# Patient Record
Sex: MALE | Race: WHITE | Employment: OTHER | URBAN - METROPOLITAN AREA
[De-identification: names, ages, dates, MRNs, and addresses within clinical notes are randomized per-mention and may not be internally consistent; named-entity substitution may affect disease eponyms.]

---

## 2020-11-02 ENCOUNTER — TRANSCRIBE ORDER (OUTPATIENT)
Dept: REGISTRATION | Age: 80
End: 2020-11-02

## 2020-11-02 DIAGNOSIS — Z01.812 PRE-PROCEDURE LAB EXAM: Primary | ICD-10-CM

## 2020-11-05 ENCOUNTER — HOSPITAL ENCOUNTER (OUTPATIENT)
Dept: PREADMISSION TESTING | Age: 80
Discharge: HOME | End: 2020-11-05
Payer: MEDICARE

## 2020-11-05 ENCOUNTER — HOSPITAL ENCOUNTER (OUTPATIENT)
Dept: GENERAL RADIOLOGY | Age: 80
Discharge: HOME | End: 2020-11-05
Attending: OTOLARYNGOLOGY
Payer: MEDICARE

## 2020-11-05 VITALS
HEIGHT: 67 IN | DIASTOLIC BLOOD PRESSURE: 79 MMHG | BODY MASS INDEX: 26.3 KG/M2 | OXYGEN SATURATION: 95 % | WEIGHT: 167.55 LBS | SYSTOLIC BLOOD PRESSURE: 133 MMHG | TEMPERATURE: 98.1 F | HEART RATE: 57 BPM

## 2020-11-05 LAB
ABO + RH BLD: NORMAL
ALBUMIN SERPL-MCNC: 3.8 G/DL (ref 3.5–5)
ALBUMIN/GLOB SERPL: 1.3 {RATIO} (ref 1.1–2.2)
ALP SERPL-CCNC: 84 U/L (ref 45–117)
ALT SERPL-CCNC: 38 U/L (ref 12–78)
ANION GAP SERPL CALC-SCNC: 5 MMOL/L (ref 5–15)
AST SERPL-CCNC: 35 U/L (ref 15–37)
BASOPHILS # BLD: 0.1 K/UL (ref 0–0.1)
BASOPHILS NFR BLD: 1 % (ref 0–1)
BILIRUB SERPL-MCNC: 0.8 MG/DL (ref 0.2–1)
BLOOD GROUP ANTIBODIES SERPL: NORMAL
BUN SERPL-MCNC: 15 MG/DL (ref 6–20)
BUN/CREAT SERPL: 15 (ref 12–20)
CALCIUM SERPL-MCNC: 9 MG/DL (ref 8.5–10.1)
CHLORIDE SERPL-SCNC: 106 MMOL/L (ref 97–108)
CO2 SERPL-SCNC: 27 MMOL/L (ref 21–32)
CREAT SERPL-MCNC: 0.97 MG/DL (ref 0.7–1.3)
DIFFERENTIAL METHOD BLD: ABNORMAL
EOSINOPHIL # BLD: 0.1 K/UL (ref 0–0.4)
EOSINOPHIL NFR BLD: 1 % (ref 0–7)
ERYTHROCYTE [DISTWIDTH] IN BLOOD BY AUTOMATED COUNT: 14.1 % (ref 11.5–14.5)
GLOBULIN SER CALC-MCNC: 2.9 G/DL (ref 2–4)
GLUCOSE SERPL-MCNC: 94 MG/DL (ref 65–100)
HCT VFR BLD AUTO: 37.6 % (ref 36.6–50.3)
HGB BLD-MCNC: 12.1 G/DL (ref 12.1–17)
IMM GRANULOCYTES # BLD AUTO: 0 K/UL (ref 0–0.04)
IMM GRANULOCYTES NFR BLD AUTO: 0 % (ref 0–0.5)
INR PPP: 1.6 (ref 0.9–1.1)
LYMPHOCYTES # BLD: 0.6 K/UL (ref 0.8–3.5)
LYMPHOCYTES NFR BLD: 12 % (ref 12–49)
MCH RBC QN AUTO: 31.2 PG (ref 26–34)
MCHC RBC AUTO-ENTMCNC: 32.2 G/DL (ref 30–36.5)
MCV RBC AUTO: 96.9 FL (ref 80–99)
MONOCYTES # BLD: 0.5 K/UL (ref 0–1)
MONOCYTES NFR BLD: 9 % (ref 5–13)
NEUTS SEG # BLD: 4 K/UL (ref 1.8–8)
NEUTS SEG NFR BLD: 77 % (ref 32–75)
NRBC # BLD: 0 K/UL (ref 0–0.01)
NRBC BLD-RTO: 0 PER 100 WBC
PLATELET # BLD AUTO: 141 K/UL (ref 150–400)
PMV BLD AUTO: 11 FL (ref 8.9–12.9)
POTASSIUM SERPL-SCNC: 4.2 MMOL/L (ref 3.5–5.1)
PROT SERPL-MCNC: 6.7 G/DL (ref 6.4–8.2)
PROTHROMBIN TIME: 16.3 SEC (ref 9–11.1)
RBC # BLD AUTO: 3.88 M/UL (ref 4.1–5.7)
RBC MORPH BLD: ABNORMAL
SODIUM SERPL-SCNC: 138 MMOL/L (ref 136–145)
SPECIMEN EXP DATE BLD: NORMAL
WBC # BLD AUTO: 5.3 K/UL (ref 4.1–11.1)

## 2020-11-05 PROCEDURE — 86900 BLOOD TYPING SEROLOGIC ABO: CPT

## 2020-11-05 PROCEDURE — 36415 COLL VENOUS BLD VENIPUNCTURE: CPT

## 2020-11-05 PROCEDURE — 71046 X-RAY EXAM CHEST 2 VIEWS: CPT

## 2020-11-05 PROCEDURE — 80053 COMPREHEN METABOLIC PANEL: CPT

## 2020-11-05 PROCEDURE — 85025 COMPLETE CBC W/AUTO DIFF WBC: CPT

## 2020-11-05 PROCEDURE — 85610 PROTHROMBIN TIME: CPT

## 2020-11-05 RX ORDER — ACETYLCYSTEINE 600 MG
600 CAPSULE ORAL
Status: ON HOLD | COMMUNITY
End: 2021-12-21

## 2020-11-05 RX ORDER — WARFARIN 4 MG/1
4 TABLET ORAL EVERY EVENING
Status: ON HOLD | COMMUNITY
End: 2021-12-21

## 2020-11-05 RX ORDER — GABAPENTIN 100 MG/1
100 CAPSULE ORAL 2 TIMES DAILY
Status: ON HOLD | COMMUNITY
End: 2021-12-21

## 2020-11-05 RX ORDER — BISOPROLOL FUMARATE AND HYDROCHLOROTHIAZIDE 2.5; 6.25 MG/1; MG/1
1 TABLET ORAL DAILY
Status: ON HOLD | COMMUNITY
End: 2021-12-21

## 2020-11-05 RX ORDER — POTASSIUM CHLORIDE 750 MG/1
10 TABLET, FILM COATED, EXTENDED RELEASE ORAL
COMMUNITY

## 2020-11-05 RX ORDER — SPIRONOLACTONE 25 MG
TABLET ORAL
Status: ON HOLD | COMMUNITY
End: 2021-12-21

## 2020-11-05 RX ORDER — HYDROXYCHLOROQUINE SULFATE 200 MG/1
200 TABLET, FILM COATED ORAL 2 TIMES DAILY
COMMUNITY

## 2020-11-05 RX ORDER — PREDNISONE 1 MG/1
1 TABLET ORAL
Status: ON HOLD | COMMUNITY
End: 2021-12-21

## 2020-11-05 RX ORDER — MYCOPHENOLATE MOFETIL 500 MG/1
500 TABLET ORAL EVERY EVENING
Status: ON HOLD | COMMUNITY
End: 2021-12-21

## 2020-11-05 NOTE — PERIOP NOTES
Preoperative instructions reviewed with patient. Patient given SSI infection FAQS sheet. Given two bottles of skin prep chlorhexidine soap; given written and verbal instructions on use. Patient was given the opportunity to ask questions on the information provided. Patient instructed to obtain chest X-ray at [address] [place] [address] upon leaving PAT department. INFORMATION REGARDING COVID 19 TESTING PRIOR TO SURGERY WAS PROVIDED TO THE PATIENT WITH THE OPPORTUNITY FOR QUESTIONS. PATIENT VERBALIZED UNDERSTANDING. CONFIRMED WITH PT AND HIS WIFE HE IS TAKING BISOPROLOL-HYDROCHLOROTHIAZIDE AND INSTRUCTED HIM TO TAKE DOSE MORNING OF SURGERY WITH A SMALL SIP OF WATER PER ANESTHESIA GUIDELINES.

## 2020-11-06 NOTE — PERIOP NOTES
11/6/20 @ 1300 - LEFT A DETAILED MESSAGE ON [facility]. CARE COORDINATOR VOICE MAIL IN REGARDS TO PT'S ABNORMAL LABS. INR=1.6, PT-16.3, SLU=848, RBC=3.88, NEUTROPH=77, ABS. LYMPH=0.6  REQUESTED RETURN CALL TO VERIFY THAT MESSAGE WAS RECEIVED. LABS FAXED TO PT'S PCP.

## 2020-11-08 ENCOUNTER — HOSPITAL ENCOUNTER (OUTPATIENT)
Dept: PREADMISSION TESTING | Age: 80
Discharge: HOME | End: 2020-11-08
Payer: MEDICARE

## 2020-11-08 DIAGNOSIS — Z01.812 PRE-PROCEDURE LAB EXAM: ICD-10-CM

## 2020-11-08 PROCEDURE — 87635 SARS-COV-2 COVID-19 AMP PRB: CPT

## 2020-11-09 LAB — SARS-COV-2, COV2NT: NOT DETECTED

## 2020-11-12 ENCOUNTER — ANESTHESIA EVENT (OUTPATIENT)
Dept: SURGERY | Age: 80
DRG: 577 | End: 2020-11-12
Payer: MEDICARE

## 2020-11-12 ENCOUNTER — ANESTHESIA (OUTPATIENT)
Dept: SURGERY | Age: 80
DRG: 577 | End: 2020-11-12
Payer: MEDICARE

## 2020-11-12 ENCOUNTER — HOSPITAL ENCOUNTER (INPATIENT)
Age: 80
LOS: 2 days | Discharge: HOME | DRG: 577 | End: 2020-11-14
Attending: OTOLARYNGOLOGY | Admitting: OTOLARYNGOLOGY
Payer: MEDICARE

## 2020-11-12 DIAGNOSIS — C44.320 SCC (SQUAMOUS CELL CARCINOMA), FACE: Primary | ICD-10-CM

## 2020-11-12 LAB — INR BLD: 1.3 (ref 0.9–1.2)

## 2020-11-12 PROCEDURE — 0HB1XZZ EXCISION OF FACE SKIN, EXTERNAL APPROACH: ICD-10-PCS | Performed by: OTOLARYNGOLOGY

## 2020-11-12 PROCEDURE — 74011000250 HC RX REV CODE- 250: Performed by: NURSE ANESTHETIST, CERTIFIED REGISTERED

## 2020-11-12 PROCEDURE — 07B20ZZ EXCISION OF LEFT NECK LYMPHATIC, OPEN APPROACH: ICD-10-PCS | Performed by: OTOLARYNGOLOGY

## 2020-11-12 PROCEDURE — 0CT90ZZ RESECTION OF LEFT PAROTID GLAND, OPEN APPROACH: ICD-10-PCS | Performed by: OTOLARYNGOLOGY

## 2020-11-12 PROCEDURE — 76060000040 HC ANESTHESIA 4.5 TO 5 HR: Performed by: OTOLARYNGOLOGY

## 2020-11-12 PROCEDURE — 88307 TISSUE EXAM BY PATHOLOGIST: CPT

## 2020-11-12 PROCEDURE — 77030040356 HC CORD BPLR FRCP COVD -A: Performed by: OTOLARYNGOLOGY

## 2020-11-12 PROCEDURE — 74011250637 HC RX REV CODE- 250/637: Performed by: OTOLARYNGOLOGY

## 2020-11-12 PROCEDURE — 88331 PATH CONSLTJ SURG 1 BLK 1SPC: CPT

## 2020-11-12 PROCEDURE — 88305 TISSUE EXAM BY PATHOLOGIST: CPT

## 2020-11-12 PROCEDURE — 77030002933 HC SUT MCRYL J&J -A: Performed by: OTOLARYNGOLOGY

## 2020-11-12 PROCEDURE — 74011250636 HC RX REV CODE- 250/636: Performed by: OTOLARYNGOLOGY

## 2020-11-12 PROCEDURE — 74011250637 HC RX REV CODE- 250/637: Performed by: ANESTHESIOLOGY

## 2020-11-12 PROCEDURE — 4A11X4Z MONITORING OF PERIPHERAL NERVOUS ELECTRICAL ACTIVITY, EXTERNAL APPROACH: ICD-10-PCS | Performed by: OTOLARYNGOLOGY

## 2020-11-12 PROCEDURE — 77030002996 HC SUT SLK J&J -A: Performed by: OTOLARYNGOLOGY

## 2020-11-12 PROCEDURE — 74011250636 HC RX REV CODE- 250/636: Performed by: ANESTHESIOLOGY

## 2020-11-12 PROCEDURE — 77030040922 HC BLNKT HYPOTHRM STRY -A

## 2020-11-12 PROCEDURE — 74011000250 HC RX REV CODE- 250: Performed by: OTOLARYNGOLOGY

## 2020-11-12 PROCEDURE — 77030008684 HC TU ET CUF COVD -B: Performed by: ANESTHESIOLOGY

## 2020-11-12 PROCEDURE — 77030026438 HC STYL ET INTUB CARD -A: Performed by: ANESTHESIOLOGY

## 2020-11-12 PROCEDURE — 85610 PROTHROMBIN TIME: CPT

## 2020-11-12 PROCEDURE — 76010000175 HC OR TIME 4 TO 4.5 HR INTENSV-TIER 1: Performed by: OTOLARYNGOLOGY

## 2020-11-12 PROCEDURE — 74011250636 HC RX REV CODE- 250/636: Performed by: NURSE ANESTHETIST, CERTIFIED REGISTERED

## 2020-11-12 PROCEDURE — 77030008462 HC STPLR SKN PROX J&J -A: Performed by: OTOLARYNGOLOGY

## 2020-11-12 PROCEDURE — 77030040361 HC SLV COMPR DVT MDII -B: Performed by: OTOLARYNGOLOGY

## 2020-11-12 PROCEDURE — 00BM0ZZ EXCISION OF FACIAL NERVE, OPEN APPROACH: ICD-10-PCS | Performed by: OTOLARYNGOLOGY

## 2020-11-12 PROCEDURE — 77030002974 HC SUT PLN J&J -A: Performed by: OTOLARYNGOLOGY

## 2020-11-12 PROCEDURE — 0HX5XZZ TRANSFER CHEST SKIN, EXTERNAL APPROACH: ICD-10-PCS | Performed by: OTOLARYNGOLOGY

## 2020-11-12 PROCEDURE — 65270000032 HC RM SEMIPRIVATE

## 2020-11-12 PROCEDURE — 77030011267 HC ELECTRD BLD COVD -A: Performed by: OTOLARYNGOLOGY

## 2020-11-12 PROCEDURE — 77030012623 HC STIM NRV HND MEDT -B: Performed by: OTOLARYNGOLOGY

## 2020-11-12 PROCEDURE — 2709999900 HC NON-CHARGEABLE SUPPLY: Performed by: OTOLARYNGOLOGY

## 2020-11-12 PROCEDURE — 76210000006 HC OR PH I REC 0.5 TO 1 HR: Performed by: OTOLARYNGOLOGY

## 2020-11-12 PROCEDURE — 77030031139 HC SUT VCRL2 J&J -A: Performed by: OTOLARYNGOLOGY

## 2020-11-12 RX ORDER — NEOSTIGMINE METHYLSULFATE 1 MG/ML
INJECTION, SOLUTION INTRAVENOUS AS NEEDED
Status: DISCONTINUED | OUTPATIENT
Start: 2020-11-12 | End: 2020-11-12 | Stop reason: HOSPADM

## 2020-11-12 RX ORDER — ONDANSETRON 2 MG/ML
INJECTION INTRAMUSCULAR; INTRAVENOUS AS NEEDED
Status: DISCONTINUED | OUTPATIENT
Start: 2020-11-12 | End: 2020-11-12 | Stop reason: HOSPADM

## 2020-11-12 RX ORDER — ROCURONIUM BROMIDE 10 MG/ML
INJECTION, SOLUTION INTRAVENOUS AS NEEDED
Status: DISCONTINUED | OUTPATIENT
Start: 2020-11-12 | End: 2020-11-12 | Stop reason: HOSPADM

## 2020-11-12 RX ORDER — BISOPROLOL FUMARATE AND HYDROCHLOROTHIAZIDE 2.5; 6.25 MG/1; MG/1
1 TABLET ORAL DAILY
Status: DISCONTINUED | OUTPATIENT
Start: 2020-11-13 | End: 2020-11-14 | Stop reason: HOSPADM

## 2020-11-12 RX ORDER — DIPHENHYDRAMINE HYDROCHLORIDE 50 MG/ML
12.5 INJECTION, SOLUTION INTRAMUSCULAR; INTRAVENOUS AS NEEDED
Status: DISCONTINUED | OUTPATIENT
Start: 2020-11-12 | End: 2020-11-12 | Stop reason: HOSPADM

## 2020-11-12 RX ORDER — PROPOFOL 10 MG/ML
INJECTION, EMULSION INTRAVENOUS AS NEEDED
Status: DISCONTINUED | OUTPATIENT
Start: 2020-11-12 | End: 2020-11-12 | Stop reason: HOSPADM

## 2020-11-12 RX ORDER — SODIUM CHLORIDE, SODIUM LACTATE, POTASSIUM CHLORIDE, CALCIUM CHLORIDE 600; 310; 30; 20 MG/100ML; MG/100ML; MG/100ML; MG/100ML
100 INJECTION, SOLUTION INTRAVENOUS CONTINUOUS
Status: DISCONTINUED | OUTPATIENT
Start: 2020-11-12 | End: 2020-11-12 | Stop reason: HOSPADM

## 2020-11-12 RX ORDER — CEFAZOLIN SODIUM/WATER 2 G/20 ML
2 SYRINGE (ML) INTRAVENOUS ONCE
Status: DISCONTINUED | OUTPATIENT
Start: 2020-11-12 | End: 2020-11-12 | Stop reason: HOSPADM

## 2020-11-12 RX ORDER — SODIUM CHLORIDE 0.9 % (FLUSH) 0.9 %
5-40 SYRINGE (ML) INJECTION AS NEEDED
Status: DISCONTINUED | OUTPATIENT
Start: 2020-11-12 | End: 2020-11-12 | Stop reason: HOSPADM

## 2020-11-12 RX ORDER — MORPHINE SULFATE 10 MG/ML
2 INJECTION, SOLUTION INTRAMUSCULAR; INTRAVENOUS
Status: DISCONTINUED | OUTPATIENT
Start: 2020-11-12 | End: 2020-11-12 | Stop reason: HOSPADM

## 2020-11-12 RX ORDER — HYDROMORPHONE HYDROCHLORIDE 2 MG/ML
INJECTION, SOLUTION INTRAMUSCULAR; INTRAVENOUS; SUBCUTANEOUS AS NEEDED
Status: DISCONTINUED | OUTPATIENT
Start: 2020-11-12 | End: 2020-11-12 | Stop reason: HOSPADM

## 2020-11-12 RX ORDER — FENTANYL CITRATE 50 UG/ML
25 INJECTION, SOLUTION INTRAMUSCULAR; INTRAVENOUS
Status: DISCONTINUED | OUTPATIENT
Start: 2020-11-12 | End: 2020-11-12 | Stop reason: HOSPADM

## 2020-11-12 RX ORDER — ATORVASTATIN CALCIUM 40 MG/1
40 TABLET, FILM COATED ORAL EVERY EVENING
Status: DISCONTINUED | OUTPATIENT
Start: 2020-11-12 | End: 2020-11-14 | Stop reason: HOSPADM

## 2020-11-12 RX ORDER — EPHEDRINE SULFATE/0.9% NACL/PF 50 MG/5 ML
SYRINGE (ML) INTRAVENOUS AS NEEDED
Status: DISCONTINUED | OUTPATIENT
Start: 2020-11-12 | End: 2020-11-12 | Stop reason: HOSPADM

## 2020-11-12 RX ORDER — BACITRACIN 500 [USP'U]/G
OINTMENT TOPICAL AS NEEDED
Status: DISCONTINUED | OUTPATIENT
Start: 2020-11-12 | End: 2020-11-12 | Stop reason: HOSPADM

## 2020-11-12 RX ORDER — SODIUM CHLORIDE 0.9 % (FLUSH) 0.9 %
5-40 SYRINGE (ML) INJECTION EVERY 8 HOURS
Status: DISCONTINUED | OUTPATIENT
Start: 2020-11-12 | End: 2020-11-12 | Stop reason: HOSPADM

## 2020-11-12 RX ORDER — LIDOCAINE HYDROCHLORIDE 10 MG/ML
0.1 INJECTION, SOLUTION EPIDURAL; INFILTRATION; INTRACAUDAL; PERINEURAL AS NEEDED
Status: DISCONTINUED | OUTPATIENT
Start: 2020-11-12 | End: 2020-11-12 | Stop reason: HOSPADM

## 2020-11-12 RX ORDER — PHENYLEPHRINE HCL IN 0.9% NACL 0.4MG/10ML
SYRINGE (ML) INTRAVENOUS AS NEEDED
Status: DISCONTINUED | OUTPATIENT
Start: 2020-11-12 | End: 2020-11-12 | Stop reason: HOSPADM

## 2020-11-12 RX ORDER — SODIUM CHLORIDE, SODIUM LACTATE, POTASSIUM CHLORIDE, CALCIUM CHLORIDE 600; 310; 30; 20 MG/100ML; MG/100ML; MG/100ML; MG/100ML
INJECTION, SOLUTION INTRAVENOUS
Status: DISCONTINUED | OUTPATIENT
Start: 2020-11-12 | End: 2020-11-12 | Stop reason: HOSPADM

## 2020-11-12 RX ORDER — HYDROCODONE BITARTRATE AND ACETAMINOPHEN 5; 325 MG/1; MG/1
1 TABLET ORAL
Status: DISCONTINUED | OUTPATIENT
Start: 2020-11-12 | End: 2020-11-14 | Stop reason: HOSPADM

## 2020-11-12 RX ORDER — SODIUM CHLORIDE 9 MG/ML
25 INJECTION, SOLUTION INTRAVENOUS CONTINUOUS
Status: DISCONTINUED | OUTPATIENT
Start: 2020-11-12 | End: 2020-11-12 | Stop reason: HOSPADM

## 2020-11-12 RX ORDER — OXYCODONE HYDROCHLORIDE 5 MG/1
5 TABLET ORAL AS NEEDED
Status: DISCONTINUED | OUTPATIENT
Start: 2020-11-12 | End: 2020-11-12 | Stop reason: HOSPADM

## 2020-11-12 RX ORDER — MIDAZOLAM HYDROCHLORIDE 1 MG/ML
1 INJECTION, SOLUTION INTRAMUSCULAR; INTRAVENOUS AS NEEDED
Status: DISCONTINUED | OUTPATIENT
Start: 2020-11-12 | End: 2020-11-12 | Stop reason: HOSPADM

## 2020-11-12 RX ORDER — DEXAMETHASONE SODIUM PHOSPHATE 4 MG/ML
INJECTION, SOLUTION INTRA-ARTICULAR; INTRALESIONAL; INTRAMUSCULAR; INTRAVENOUS; SOFT TISSUE AS NEEDED
Status: DISCONTINUED | OUTPATIENT
Start: 2020-11-12 | End: 2020-11-12 | Stop reason: HOSPADM

## 2020-11-12 RX ORDER — ACETAMINOPHEN 325 MG/1
650 TABLET ORAL ONCE
Status: COMPLETED | OUTPATIENT
Start: 2020-11-12 | End: 2020-11-12

## 2020-11-12 RX ORDER — HYDROXYCHLOROQUINE SULFATE 200 MG/1
200 TABLET, FILM COATED ORAL 2 TIMES DAILY
Status: DISCONTINUED | OUTPATIENT
Start: 2020-11-12 | End: 2020-11-14 | Stop reason: HOSPADM

## 2020-11-12 RX ORDER — PREDNISONE 1 MG/1
1 TABLET ORAL
Status: DISCONTINUED | OUTPATIENT
Start: 2020-11-13 | End: 2020-11-14 | Stop reason: HOSPADM

## 2020-11-12 RX ORDER — GABAPENTIN 100 MG/1
100 CAPSULE ORAL 2 TIMES DAILY
Status: DISCONTINUED | OUTPATIENT
Start: 2020-11-12 | End: 2020-11-14 | Stop reason: HOSPADM

## 2020-11-12 RX ORDER — FENTANYL CITRATE 50 UG/ML
INJECTION, SOLUTION INTRAMUSCULAR; INTRAVENOUS AS NEEDED
Status: DISCONTINUED | OUTPATIENT
Start: 2020-11-12 | End: 2020-11-12 | Stop reason: HOSPADM

## 2020-11-12 RX ORDER — SODIUM CHLORIDE, SODIUM LACTATE, POTASSIUM CHLORIDE, CALCIUM CHLORIDE 600; 310; 30; 20 MG/100ML; MG/100ML; MG/100ML; MG/100ML
1000 INJECTION, SOLUTION INTRAVENOUS CONTINUOUS
Status: DISPENSED | OUTPATIENT
Start: 2020-11-12 | End: 2020-11-13

## 2020-11-12 RX ORDER — AMLODIPINE BESYLATE 5 MG/1
5 TABLET ORAL EVERY EVENING
Status: DISCONTINUED | OUTPATIENT
Start: 2020-11-12 | End: 2020-11-14 | Stop reason: HOSPADM

## 2020-11-12 RX ORDER — CEFAZOLIN SODIUM/WATER 2 G/20 ML
2 SYRINGE (ML) INTRAVENOUS ONCE
Status: COMPLETED | OUTPATIENT
Start: 2020-11-12 | End: 2020-11-12

## 2020-11-12 RX ORDER — MYCOPHENOLATE MOFETIL 250 MG/1
500 CAPSULE ORAL
Status: DISCONTINUED | OUTPATIENT
Start: 2020-11-13 | End: 2020-11-14 | Stop reason: HOSPADM

## 2020-11-12 RX ORDER — MIDAZOLAM HYDROCHLORIDE 1 MG/ML
INJECTION, SOLUTION INTRAMUSCULAR; INTRAVENOUS AS NEEDED
Status: DISCONTINUED | OUTPATIENT
Start: 2020-11-12 | End: 2020-11-12 | Stop reason: HOSPADM

## 2020-11-12 RX ORDER — LIDOCAINE HYDROCHLORIDE AND EPINEPHRINE 10; 10 MG/ML; UG/ML
INJECTION, SOLUTION INFILTRATION; PERINEURAL AS NEEDED
Status: DISCONTINUED | OUTPATIENT
Start: 2020-11-12 | End: 2020-11-12 | Stop reason: HOSPADM

## 2020-11-12 RX ORDER — LIDOCAINE HYDROCHLORIDE 20 MG/ML
INJECTION, SOLUTION EPIDURAL; INFILTRATION; INTRACAUDAL; PERINEURAL AS NEEDED
Status: DISCONTINUED | OUTPATIENT
Start: 2020-11-12 | End: 2020-11-12 | Stop reason: HOSPADM

## 2020-11-12 RX ORDER — GLYCOPYRROLATE 0.2 MG/ML
INJECTION INTRAMUSCULAR; INTRAVENOUS AS NEEDED
Status: DISCONTINUED | OUTPATIENT
Start: 2020-11-12 | End: 2020-11-12 | Stop reason: HOSPADM

## 2020-11-12 RX ORDER — MIDAZOLAM HYDROCHLORIDE 1 MG/ML
0.5 INJECTION, SOLUTION INTRAMUSCULAR; INTRAVENOUS
Status: DISCONTINUED | OUTPATIENT
Start: 2020-11-12 | End: 2020-11-12 | Stop reason: HOSPADM

## 2020-11-12 RX ORDER — ALLOPURINOL 300 MG/1
300 TABLET ORAL DAILY
Status: DISCONTINUED | OUTPATIENT
Start: 2020-11-13 | End: 2020-11-14 | Stop reason: HOSPADM

## 2020-11-12 RX ORDER — ONDANSETRON 2 MG/ML
4 INJECTION INTRAMUSCULAR; INTRAVENOUS AS NEEDED
Status: DISCONTINUED | OUTPATIENT
Start: 2020-11-12 | End: 2020-11-12 | Stop reason: HOSPADM

## 2020-11-12 RX ORDER — FENTANYL CITRATE 50 UG/ML
50 INJECTION, SOLUTION INTRAMUSCULAR; INTRAVENOUS AS NEEDED
Status: DISCONTINUED | OUTPATIENT
Start: 2020-11-12 | End: 2020-11-12 | Stop reason: HOSPADM

## 2020-11-12 RX ORDER — MULTIVIT WITH MINERALS/HERBS
1 TABLET ORAL DAILY
Status: DISCONTINUED | OUTPATIENT
Start: 2020-11-13 | End: 2020-11-14 | Stop reason: HOSPADM

## 2020-11-12 RX ORDER — FERROUS SULFATE, DRIED 160(50) MG
1 TABLET, EXTENDED RELEASE ORAL
Status: DISCONTINUED | OUTPATIENT
Start: 2020-11-13 | End: 2020-11-14 | Stop reason: HOSPADM

## 2020-11-12 RX ORDER — ROPIVACAINE HYDROCHLORIDE 5 MG/ML
150 INJECTION, SOLUTION EPIDURAL; INFILTRATION; PERINEURAL AS NEEDED
Status: DISCONTINUED | OUTPATIENT
Start: 2020-11-12 | End: 2020-11-12 | Stop reason: HOSPADM

## 2020-11-12 RX ORDER — HYDROMORPHONE HYDROCHLORIDE 1 MG/ML
0.2 INJECTION, SOLUTION INTRAMUSCULAR; INTRAVENOUS; SUBCUTANEOUS
Status: DISCONTINUED | OUTPATIENT
Start: 2020-11-12 | End: 2020-11-12 | Stop reason: HOSPADM

## 2020-11-12 RX ORDER — SODIUM CHLORIDE 9 MG/ML
25 INJECTION, SOLUTION INTRAVENOUS CONTINUOUS
Status: DISPENSED | OUTPATIENT
Start: 2020-11-12 | End: 2020-11-13

## 2020-11-12 RX ORDER — EPHEDRINE SULFATE/0.9% NACL/PF 50 MG/5 ML
5 SYRINGE (ML) INTRAVENOUS AS NEEDED
Status: DISCONTINUED | OUTPATIENT
Start: 2020-11-12 | End: 2020-11-12 | Stop reason: HOSPADM

## 2020-11-12 RX ORDER — POTASSIUM CHLORIDE 750 MG/1
10 TABLET, FILM COATED, EXTENDED RELEASE ORAL EVERY EVENING
Status: DISCONTINUED | OUTPATIENT
Start: 2020-11-12 | End: 2020-11-14 | Stop reason: HOSPADM

## 2020-11-12 RX ORDER — SUCCINYLCHOLINE CHLORIDE 20 MG/ML
INJECTION INTRAMUSCULAR; INTRAVENOUS AS NEEDED
Status: DISCONTINUED | OUTPATIENT
Start: 2020-11-12 | End: 2020-11-12 | Stop reason: HOSPADM

## 2020-11-12 RX ADMIN — FENTANYL CITRATE 50 MCG: 50 INJECTION, SOLUTION INTRAMUSCULAR; INTRAVENOUS at 16:24

## 2020-11-12 RX ADMIN — SODIUM CHLORIDE, SODIUM LACTATE, POTASSIUM CHLORIDE, AND CALCIUM CHLORIDE 1000 ML: 600; 310; 30; 20 INJECTION, SOLUTION INTRAVENOUS at 13:39

## 2020-11-12 RX ADMIN — MIDAZOLAM 2 MG: 1 INJECTION INTRAMUSCULAR; INTRAVENOUS at 14:46

## 2020-11-12 RX ADMIN — FENTANYL CITRATE 100 MCG: 50 INJECTION, SOLUTION INTRAMUSCULAR; INTRAVENOUS at 14:56

## 2020-11-12 RX ADMIN — HYDROXYCHLOROQUINE SULFATE 200 MG: 200 TABLET, FILM COATED ORAL at 20:59

## 2020-11-12 RX ADMIN — ACETAMINOPHEN 650 MG: 325 TABLET ORAL at 13:49

## 2020-11-12 RX ADMIN — ROCURONIUM BROMIDE 25 MG: 10 SOLUTION INTRAVENOUS at 17:25

## 2020-11-12 RX ADMIN — Medication 80 MCG: at 16:00

## 2020-11-12 RX ADMIN — HYDROMORPHONE HYDROCHLORIDE 0.6 MG: 2 INJECTION, SOLUTION INTRAMUSCULAR; INTRAVENOUS; SUBCUTANEOUS at 18:01

## 2020-11-12 RX ADMIN — DEXAMETHASONE SODIUM PHOSPHATE 8 MG: 4 INJECTION, SOLUTION INTRAMUSCULAR; INTRAVENOUS at 15:10

## 2020-11-12 RX ADMIN — SODIUM CHLORIDE: 900 INJECTION, SOLUTION INTRAVENOUS at 16:55

## 2020-11-12 RX ADMIN — Medication 20 MG: at 15:13

## 2020-11-12 RX ADMIN — LIDOCAINE HYDROCHLORIDE 100 MG: 20 INJECTION, SOLUTION EPIDURAL; INFILTRATION; INTRACAUDAL; PERINEURAL at 14:56

## 2020-11-12 RX ADMIN — Medication 2 MG: at 18:44

## 2020-11-12 RX ADMIN — GLYCOPYRROLATE 0.2 MG: 0.2 INJECTION, SOLUTION INTRAMUSCULAR; INTRAVENOUS at 16:35

## 2020-11-12 RX ADMIN — PROPOFOL 100 MG: 10 INJECTION, EMULSION INTRAVENOUS at 16:23

## 2020-11-12 RX ADMIN — AMLODIPINE BESYLATE 5 MG: 5 TABLET ORAL at 20:59

## 2020-11-12 RX ADMIN — HYDROMORPHONE HYDROCHLORIDE 0.4 MG: 2 INJECTION, SOLUTION INTRAMUSCULAR; INTRAVENOUS; SUBCUTANEOUS at 16:25

## 2020-11-12 RX ADMIN — GLYCOPYRROLATE 0.4 MG: 0.2 INJECTION, SOLUTION INTRAMUSCULAR; INTRAVENOUS at 18:44

## 2020-11-12 RX ADMIN — POTASSIUM CHLORIDE 10 MEQ: 750 TABLET, FILM COATED, EXTENDED RELEASE ORAL at 20:59

## 2020-11-12 RX ADMIN — ATORVASTATIN CALCIUM 40 MG: 40 TABLET, FILM COATED ORAL at 20:59

## 2020-11-12 RX ADMIN — GABAPENTIN 100 MG: 100 CAPSULE ORAL at 20:59

## 2020-11-12 RX ADMIN — Medication 10 MG: at 15:56

## 2020-11-12 RX ADMIN — ROCURONIUM BROMIDE 5 MG: 10 SOLUTION INTRAVENOUS at 14:56

## 2020-11-12 RX ADMIN — Medication 120 MCG: at 15:12

## 2020-11-12 RX ADMIN — Medication 2 G: at 15:21

## 2020-11-12 RX ADMIN — Medication 10 MG: at 16:32

## 2020-11-12 RX ADMIN — SODIUM CHLORIDE, SODIUM LACTATE, POTASSIUM CHLORIDE, AND CALCIUM CHLORIDE 1000 ML: 600; 310; 30; 20 INJECTION, SOLUTION INTRAVENOUS at 19:30

## 2020-11-12 RX ADMIN — PROPOFOL 50 MG: 10 INJECTION, EMULSION INTRAVENOUS at 15:15

## 2020-11-12 RX ADMIN — SUCCINYLCHOLINE CHLORIDE 160 MG: 20 INJECTION, SOLUTION INTRAMUSCULAR; INTRAVENOUS at 14:56

## 2020-11-12 RX ADMIN — FENTANYL CITRATE 50 MCG: 50 INJECTION, SOLUTION INTRAMUSCULAR; INTRAVENOUS at 15:39

## 2020-11-12 RX ADMIN — PROPOFOL 150 MG: 10 INJECTION, EMULSION INTRAVENOUS at 14:56

## 2020-11-12 RX ADMIN — ONDANSETRON HYDROCHLORIDE 4 MG: 2 INJECTION, SOLUTION INTRAMUSCULAR; INTRAVENOUS at 18:44

## 2020-11-12 RX ADMIN — FENTANYL CITRATE 50 MCG: 50 INJECTION, SOLUTION INTRAMUSCULAR; INTRAVENOUS at 16:59

## 2020-11-12 RX ADMIN — SODIUM CHLORIDE, POTASSIUM CHLORIDE, SODIUM LACTATE AND CALCIUM CHLORIDE: 600; 310; 30; 20 INJECTION, SOLUTION INTRAVENOUS at 14:20

## 2020-11-12 NOTE — ROUTINE PROCESS
Patient: Bo Aranda. MRN: [id number]  SSN: [id number] YOB: 1940  Age: 68 y.o. Sex: male Patient is status post Procedure(s): 
TOTAL EXCISION OF LEFT PAROTID GLAND WITH MODIFIED  RADICAL NECK DISSECTION AND TISSUE REARRANGEMENT FOR DEFECT WITH FACIAL NERVE STIMULATION. Surgeon(s) and Role: Ghada Caraballo MD - Primary Local/Dose/Irrigation:  0.9% normal saline used for irrigation Peripheral IV 11/12/20 Right Hand (Active) Airway - Endotracheal Tube 11/12/20 Oral (Active) Dressing/Packing:  Wound Face Left-Dressing/Treatment: Open to air(SUTURES AND STAPLES_WITH BACITRACIN OINTMENT) (11/12/20 8118) Splint/Cast:  ] Other:  shiraz Tran discontinued at end of case

## 2020-11-12 NOTE — H&P
History and Physical    Subjective:     Mansoor Barrios is a 68 y.o.  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 Had Moh's procedure, identifying tumor extending into parotid gland, leaving positive margin. CT shows tumor in parotid and cervical LN chain. Past Medical History:   Diagnosis Date    Arthritis     CAD (coronary artery disease)     5 stents    Cancer ([facility] 2020    squamous cell, basal cell, melanoma    Coagulation disorder (HCC)     blood clotting d/o from taking naproxen    Hypertension     Lupus ([facility]     Thromboembolus (HCC)       Past Surgical History:   Procedure Laterality Date   [facility]    stents placed    HX CATARACT REMOVAL Bilateral 2010    HX OTHER SURGICAL      multiple spots of skin cancer removal    HX PACEMAKER      VASCULAR SURGERY PROCEDURE UNLIST  2017    IVC filter     Family History   Problem Relation Age of Onset    No Known Problems Mother     Stroke Father     Cancer Sister     No Known Problems Brother     Heart Disease Brother     Cancer Brother     No Known Problems Brother     Anesth Problems Neg Hx       Social History     Tobacco Use    Smoking status: Former Smoker     Packs/day: 1.00     Years: 25.00     Pack years: 25.00     Last attempt to quit: 1994     Years since quittin.8    Smokeless tobacco: Never Used   Substance Use Topics    Alcohol use: Never     Frequency: Never       Prior to Admission medications    Medication Sig Start Date End Date Taking? Authorizing Provider   bisoprolol-hydroCHLOROthiazide [facility]) 2.5-6.25 mg per tablet Take 1 Tab by mouth daily. Yes Provider, Historical   mycophenolate (CELLCEPT) 500 mg tablet Take 500 mg by mouth every evening. Yes Provider, Historical   hydrOXYchloroQUINE (PLAQUENIL) 200 mg tablet Take 200 mg by mouth two (2) times a day.    Yes Provider, Historical   predniSONE (DELTASONE) 1 mg tablet Take 1 mg by mouth daily (with breakfast). Yes Provider, Historical   potassium chloride SR (KLOR-CON 10) 10 mEq tablet Take  by mouth every evening. Yes Provider, Historical   ferrous fumarate/vit Bcomp,C (SUPER B COMPLEX PO) Take  by mouth. Yes Provider, Historical   Calcium-Cholecalciferol, D3, (Calcium 600 with Vitamin D3) 600 mg(1,500mg) -400 unit chew Take  by mouth. Yes Provider, Historical   gabapentin (NEURONTIN) 100 mg capsule Take 100 mg by mouth two (2) times a day. Yes Provider, Historical   acetylcysteine (NAC) 600 mg cap capsule Take 600 mg by mouth. Yes Provider, Historical   allopurinol (ZYLOPRIM) 300 mg tablet TAKE 1 TABLET DAILY 12/15/15  Yes Marlena Song MD   omeprazole (PRILOSEC) 20 mg capsule TAKE 1 CAPSULE DAILY  Patient taking differently: 40 mg daily (with lunch). 5/19/15  Yes Marlena Song MD   atorvastatin (LIPITOR) 20 mg tablet Take 40 mg by mouth every evening. Yes Provider, Historical   amLODIPine (NORVASC) 5 mg tablet Take 5 mg by mouth every evening. Yes Provider, Historical   aspirin delayed-release 81 mg tablet Take 81 mg by mouth daily. Yes Provider, Historical   warfarin (COUMADIN) 4 mg tablet Take 4 mg by mouth every evening. Provider, Historical   varicella zoster vacine live (VARICELLA-ZOSTER VIRUS INFECTION PROPHYLAXIS) 19,400 unit SolR injection 1 Vial by SubCUTAneous route once as needed for 1 dose. 8/23/12   Marlena Song MD     Allergies   Allergen Reactions    Alendronate Sodium Other (comments)     SEVERE PAIN    Naproxen Other (comments)     CAUSED BLOOD CLOTS    Singulair [Montelukast] Other (comments)     SEVERE HEADACHES        Review of Systems:  A comprehensive review of systems was negative except for that written in the History of Present Illness. Objective: Intake and Output:    No intake/output data recorded. No intake/output data recorded.     Physical Exam:   Visit Vitals  BP (!) 157/72 (BP 1 Location: Left arm, BP Patient Position: At rest)   Pulse (!) 59   Temp 98.4 °F (36.9 °C)   Resp 16   Ht 5' 7\" (1.702 m)   Wt 76 kg (167 lb 8.8 oz)   SpO2 98%   BMI 26.24 kg/m²     General:  Alert, cooperative, no distress, appears stated age. Head:  Normocephalic, without obvious abnormality, atraumatic. Eyes:  Conjunctivae/corneas clear. PERRL, EOMs intact. Fundi benign   Ears:  Normal TMs and external ear canals both ears. Nose: Nares normal. Septum midline. Mucosa normal. No drainage or sinus tenderness. Throat: Lips, mucosa, and tongue normal. Teeth and gums normal.   Neck: Wound 4cm in left preauricular skin. CHestnut sized mass under wound sitting on zygoma. Zones 2 and 3 LN fullness on left. Back:   Symmetric, no curvature. ROM normal. No CVA tenderness. Lungs:   Clear to auscultation bilaterally. Chest wall:  No tenderness or deformity. Heart:  Regular rate and rhythm, S1, S2 normal, no murmur, click, rub or gallop. Abdomen:   Soft, non-tender. Bowel sounds normal. No masses,  No organomegaly. Genitalia:  Normal male without lesion, discharge or tenderness. Rectal:  Normal tone, normal prostate, no masses or tenderness  Guaiac negative stool. Extremities: Extremities normal, atraumatic, no cyanosis or edema. Pulses: 2+ and symmetric all extremities. Skin: Skin color, texture, turgor normal. No rashes or lesions   Lymph nodes: Cervical, supraclavicular, and axillary nodes normal.   Neurologic: CNII-XII intact. Normal strength, sensation and reflexes throughout.          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

## 2020-11-12 NOTE — ANESTHESIA PREPROCEDURE EVALUATION
Relevant Problems   No relevant active problems       Anesthetic History   No history of anesthetic complications            Review of Systems / Medical History  Patient summary reviewed, nursing notes reviewed and pertinent labs reviewed    Pulmonary  Within defined limits                 Neuro/Psych   Within defined limits           Cardiovascular    Hypertension          Pacemaker, CAD and cardiac stents         GI/Hepatic/Renal  Within defined limits              Endo/Other        Arthritis     Other Findings   Comments: SLE         Physical Exam    Airway  Mallampati: II  TM Distance: > 6 cm  Neck ROM: normal range of motion   Mouth opening: Normal     Cardiovascular  Regular rate and rhythm,  S1 and S2 normal,  no murmur, click, rub, or gallop             Dental  No notable dental hx       Pulmonary  Breath sounds clear to auscultation               Abdominal  GI exam deferred       Other Findings            Anesthetic Plan    ASA: 3  Anesthesia type: general          Induction: Intravenous  Anesthetic plan and risks discussed with: Patient

## 2020-11-13 PROCEDURE — 65270000032 HC RM SEMIPRIVATE

## 2020-11-13 PROCEDURE — 74011636637 HC RX REV CODE- 636/637: Performed by: OTOLARYNGOLOGY

## 2020-11-13 PROCEDURE — 74011250636 HC RX REV CODE- 250/636: Performed by: OTOLARYNGOLOGY

## 2020-11-13 PROCEDURE — 74011250637 HC RX REV CODE- 250/637: Performed by: OTOLARYNGOLOGY

## 2020-11-13 PROCEDURE — 51798 US URINE CAPACITY MEASURE: CPT

## 2020-11-13 PROCEDURE — 74011000250 HC RX REV CODE- 250

## 2020-11-13 RX ORDER — BACITRACIN 500 UNIT/G
PACKET (EA) TOPICAL
Status: COMPLETED
Start: 2020-11-13 | End: 2020-11-13

## 2020-11-13 RX ADMIN — BISOPROLOL FUMARATE AND HYDROCHLOROTHIAZIDE 1 TABLET: 6.25; 2.5 TABLET ORAL at 10:00

## 2020-11-13 RX ADMIN — HYDROCODONE BITARTRATE AND ACETAMINOPHEN 1 TABLET: 5; 325 TABLET ORAL at 04:49

## 2020-11-13 RX ADMIN — HYDROCODONE BITARTRATE AND ACETAMINOPHEN 1 TABLET: 5; 325 TABLET ORAL at 23:02

## 2020-11-13 RX ADMIN — MYCOPHENOLATE MOFETIL 500 MG: 250 CAPSULE ORAL at 15:34

## 2020-11-13 RX ADMIN — GABAPENTIN 100 MG: 100 CAPSULE ORAL at 17:34

## 2020-11-13 RX ADMIN — Medication 1 TABLET: at 07:13

## 2020-11-13 RX ADMIN — PREDNISONE 1 MG: 1 TABLET ORAL at 07:13

## 2020-11-13 RX ADMIN — GABAPENTIN 100 MG: 100 CAPSULE ORAL at 09:57

## 2020-11-13 RX ADMIN — HYDROXYCHLOROQUINE SULFATE 200 MG: 200 TABLET, FILM COATED ORAL at 09:57

## 2020-11-13 RX ADMIN — HYDROXYCHLOROQUINE SULFATE 200 MG: 200 TABLET, FILM COATED ORAL at 17:34

## 2020-11-13 RX ADMIN — MYCOPHENOLATE MOFETIL 500 MG: 250 CAPSULE ORAL at 07:13

## 2020-11-13 RX ADMIN — HYDROCODONE BITARTRATE AND ACETAMINOPHEN 1 TABLET: 5; 325 TABLET ORAL at 19:06

## 2020-11-13 RX ADMIN — ATORVASTATIN CALCIUM 40 MG: 40 TABLET, FILM COATED ORAL at 17:34

## 2020-11-13 RX ADMIN — HYDROCODONE BITARTRATE AND ACETAMINOPHEN 1 TABLET: 5; 325 TABLET ORAL at 13:50

## 2020-11-13 RX ADMIN — ALLOPURINOL 300 MG: 300 TABLET ORAL at 09:57

## 2020-11-13 RX ADMIN — AMLODIPINE BESYLATE 5 MG: 5 TABLET ORAL at 17:34

## 2020-11-13 RX ADMIN — Medication 1 TABLET: at 10:00

## 2020-11-13 RX ADMIN — BACITRACIN 1 PACKET: 500 OINTMENT TOPICAL at 05:19

## 2020-11-13 RX ADMIN — HYDROCODONE BITARTRATE AND ACETAMINOPHEN 1 TABLET: 5; 325 TABLET ORAL at 10:00

## 2020-11-13 RX ADMIN — LANSOPRAZOLE 30 MG: KIT at 08:42

## 2020-11-13 RX ADMIN — POTASSIUM CHLORIDE 10 MEQ: 750 TABLET, FILM COATED, EXTENDED RELEASE ORAL at 17:34

## 2020-11-13 RX ADMIN — SODIUM CHLORIDE, SODIUM LACTATE, POTASSIUM CHLORIDE, AND CALCIUM CHLORIDE 1000 ML: 600; 310; 30; 20 INJECTION, SOLUTION INTRAVENOUS at 05:19

## 2020-11-13 NOTE — PROGRESS NOTES
Primary Nurse Eulalio Ozuna and Franck, [facility] performed a dual skin assessment on this patient Impairment noted- see wound doc flow sheet  Esteban score is 20

## 2020-11-13 NOTE — PERIOP NOTES
TRANSFER - OUT REPORT:    Verbal report given to Arlene Vizcarra(name) on [facility].  being transferred to [facility](unit) for routine post - op       Report consisted of patients Situation, Background, Assessment and   Recommendations(SBAR). Time Pre op antibiotic given:1521  Anesthesia Stop time: 2000  Yo Present on Transfer to floor:no  Order for Yo on Chart:no  Discharge Prescriptions with Chart:no    Information from the following report(s) SBAR, OR Summary, Intake/Output, Recent Results, Cardiac Rhythm NSR and Alarm Parameters  was reviewed with the receiving nurse. Opportunity for questions and clarification was provided. Is the patient on 02? yes       L/Min 2       Other 0    Is the patient on a monitor? NO    Is the nurse transporting with the patient? NO    Surgical Waiting Area notified of patient's transfer from PACU? YES      The following personal items collected during your admission accompanied patient upon transfer:   Dental Appliance:    Vision:    Hearing Aid:    Jewelry:    Clothing: Clothing: Other (comment)(clothing bag to pacu)  Other Valuables: Other Valuables:  Other (comment), Eyeglasses(glasses and hearing aids to pacu)  Valuables sent to safe:

## 2020-11-13 NOTE — ANESTHESIA POSTPROCEDURE EVALUATION
Post-Anesthesia Evaluation and Assessment    Patient: Jordan Siddiqui. MRN: [id number]  SSN: [id number]    YOB: 1940  Age: 68 y.o. Sex: male      I have evaluated the patient and they are stable and ready for discharge from the PACU. Cardiovascular Function/Vital Signs  Visit Vitals  /70   Pulse 78   Temp 36.7 °C (98.1 °F)   Resp 14   Ht 5' 7\" (1.702 m)   Wt 76 kg (167 lb 8.8 oz)   SpO2 90%   BMI 26.24 kg/m²       Patient is status post General anesthesia for Procedure(s):  TOTAL EXCISION OF LEFT PAROTID GLAND WITH MODIFIED  RADICAL NECK DISSECTION AND TISSUE REARRANGEMENT FOR DEFECT WITH FACIAL NERVE STIMULATION. Nausea/Vomiting: None    Postoperative hydration reviewed and adequate. Pain:  Pain Scale 1: Numeric (0 - 10) (11/12/20 1945)  Pain Intensity 1: 0 (11/12/20 1945)   Managed    Neurological Status:   Neuro (WDL): Within Defined Limits (11/12/20 1945)  Neuro  Neurologic State: Sleeping;Eyes open to stimulus (11/12/20 1945)  Orientation Level: Oriented X4 (11/12/20 1945)  Cognition: Follows commands (11/12/20 1945)  Speech: Clear (11/12/20 1945)  LUE Motor Response: Purposeful (11/12/20 1945)  LLE Motor Response: Purposeful (11/12/20 1945)  RUE Motor Response: Purposeful (11/12/20 1945)  RLE Motor Response: Purposeful (11/12/20 1945)   At baseline    Mental Status, Level of Consciousness: Alert and  oriented to person, place, and time    Pulmonary Status:   O2 Device: Room air (11/12/20 1945)   Adequate oxygenation and airway patent    Complications related to anesthesia: None    Post-anesthesia assessment completed. No concerns    Signed By: Destiney Rios MD     November 12, 2020              Procedure(s):  TOTAL EXCISION OF LEFT PAROTID GLAND WITH MODIFIED  RADICAL NECK DISSECTION AND TISSUE REARRANGEMENT FOR DEFECT WITH FACIAL NERVE STIMULATION.     general    <BSHSIANPOST>    INITIAL Post-op Vital signs:   Vitals Value Taken Time   /70 11/12/2020  8:00 PM   Temp 36.7 °C (98.1 °F) 11/12/2020  7:45 PM   Pulse 78 11/12/2020  8:02 PM   Resp 14 11/12/2020  8:02 PM   SpO2 92 % 11/12/2020  8:03 PM   Vitals shown include unvalidated device data.

## 2020-11-14 VITALS
RESPIRATION RATE: 18 BRPM | OXYGEN SATURATION: 93 % | DIASTOLIC BLOOD PRESSURE: 74 MMHG | BODY MASS INDEX: 26.3 KG/M2 | WEIGHT: 167.55 LBS | SYSTOLIC BLOOD PRESSURE: 116 MMHG | TEMPERATURE: 98.4 F | HEIGHT: 67 IN | HEART RATE: 70 BPM

## 2020-11-14 PROCEDURE — 74011250636 HC RX REV CODE- 250/636: Performed by: OTOLARYNGOLOGY

## 2020-11-14 PROCEDURE — 77030013218 HC SUPP FACE BSNM -B

## 2020-11-14 PROCEDURE — 74011250637 HC RX REV CODE- 250/637: Performed by: OTOLARYNGOLOGY

## 2020-11-14 PROCEDURE — 74011636637 HC RX REV CODE- 636/637: Performed by: OTOLARYNGOLOGY

## 2020-11-14 RX ADMIN — HYDROXYCHLOROQUINE SULFATE 200 MG: 200 TABLET, FILM COATED ORAL at 09:35

## 2020-11-14 RX ADMIN — HYDROCODONE BITARTRATE AND ACETAMINOPHEN 1 TABLET: 5; 325 TABLET ORAL at 07:03

## 2020-11-14 RX ADMIN — MYCOPHENOLATE MOFETIL 500 MG: 250 CAPSULE ORAL at 07:03

## 2020-11-14 RX ADMIN — HYDROCODONE BITARTRATE AND ACETAMINOPHEN 1 TABLET: 5; 325 TABLET ORAL at 11:19

## 2020-11-14 RX ADMIN — BISOPROLOL FUMARATE AND HYDROCHLOROTHIAZIDE 1 TABLET: 6.25; 2.5 TABLET ORAL at 09:35

## 2020-11-14 RX ADMIN — Medication 1 TABLET: at 07:03

## 2020-11-14 RX ADMIN — ALLOPURINOL 300 MG: 300 TABLET ORAL at 09:35

## 2020-11-14 RX ADMIN — GABAPENTIN 100 MG: 100 CAPSULE ORAL at 09:35

## 2020-11-14 RX ADMIN — Medication 1 TABLET: at 09:35

## 2020-11-14 RX ADMIN — HYDROCODONE BITARTRATE AND ACETAMINOPHEN 1 TABLET: 5; 325 TABLET ORAL at 03:03

## 2020-11-14 RX ADMIN — PREDNISONE 1 MG: 1 TABLET ORAL at 07:03

## 2020-11-14 NOTE — PROGRESS NOTES
Problem: Falls - Risk of  Goal: *Absence of Falls  Description: Document Maria Guadalupe Wiggins Fall Risk and appropriate interventions in the flowsheet.   Outcome: Resolved/Met     Problem: Patient Education: Go to Patient Education Activity  Goal: Patient/Family Education  Outcome: Resolved/Met

## 2020-11-14 NOTE — PROGRESS NOTES
Otolaryngology, Head and Neck Surgery        The patient is post operative day 1 from MRND/parotidectomy and facial flap reconstruction for recurrent SCC of facial skin. he is doing well and is tolerating a diet and the pain is under control. The patient denies any chest pain or shortness of breath. Hospital Problems  Date Reviewed: 9/3/2013          Codes Class Noted POA    SCC (squamous cell carcinoma), face ICD-10-CM: C44.320  ICD-9-CM: 173.32  11/12/2020 Unknown              Current Facility-Administered Medications   Medication Dose Route Frequency    atorvastatin (LIPITOR) tablet 40 mg  40 mg Oral QPM    amLODIPine (NORVASC) tablet 5 mg  5 mg Oral QPM    lansoprazole (PREVACID) 3 mg/mL oral suspension 30 mg  30 mg Oral ACB    allopurinoL (ZYLOPRIM) tablet 300 mg  300 mg Oral DAILY    bisoprolol-hydroCHLOROthiazide (ZIAC) 2.5-6.25 mg per tablet 1 Tab  1 Tab Oral DAILY    mycophenolate mofetil (CELLCEPT) capsule 500 mg  500 mg Oral ACB&D    hydrOXYchloroQUINE (PLAQUENIL) tablet 200 mg  200 mg Oral BID    predniSONE (DELTASONE) tablet 1 mg  1 mg Oral DAILY WITH BREAKFAST    potassium chloride SR (KLOR-CON 10) tablet 10 mEq  10 mEq Oral QPM    vitamin B complex tablet  1 Tab Oral DAILY    calcium-vitamin D (OS-SONIDO +D3) 500 mg-200 unit per tablet 1 Tab  1 Tab Oral DAILY WITH BREAKFAST    gabapentin (NEURONTIN) capsule 100 mg  100 mg Oral BID    HYDROcodone-acetaminophen (NORCO) 5-325 mg per tablet 1 Tab  1 Tab Oral Q4H PRN       No results found for this or any previous visit (from the past 24 hour(s)).         Visit Vitals  /65 (BP 1 Location: Right arm, BP Patient Position: At rest)   Pulse 67   Temp 98.2 °F (36.8 °C)   Resp 18   Ht 5' 7\" (1.702 m)   Wt 76 kg (167 lb 8.8 oz)   SpO2 94%   BMI 26.24 kg/m²       Intake/Output Summary (Last 24 hours) at 11/13/2020 0491  Last data filed at 11/13/2020 1117  Gross per 24 hour   Intake    Output 475 ml   Net -475 ml       The patient is a well developed, well nourished adult in no distress    Neck: incision intact, no swelling or increased erythema or induration    Mild left temporal branch weakness. Flap viable, secure. Serosang in drain. Chest: Clear to auscultation bilaterally. No wheezes or crackles  Cardiovascular: Regular rate and rhythm    Lower extremities: no calf tenderness    A:   Hospital Problems  Date Reviewed: 9/3/2013          Codes Class Noted POA    SCC (squamous cell carcinoma), face ICD-10-CM: C44.320  ICD-9-CM: 173.32  11/12/2020 Unknown                  Plan:  1. Will restart Coumadin tomorrow. 2. Plan drain removal and placement of gauze and Jobst compressio dressing to be worn for next 7 days, prior to D/C.   3. Office f/u in 2 weeks. 4. XRT appointment in coming week. 5. Wound care discussed with pt and wife. Planning ointment twice daily/ non-stick dressing/compression elastic x 1 week.    6.

## 2020-11-14 NOTE — DISCHARGE INSTRUCTIONS
Patient Education     Wound Care: After Your Visit  Your Care Instructions  Taking good care of your wound at home will help it heal quickly and reduce your chance of infection. The doctor has checked you carefully, but problems can develop later. If you notice any problems or new symptoms, get medical treatment right away. Follow-up care is a key part of your treatment and safety. Be sure to make and go to all appointments, and call your doctor if you are having problems. It's also a good idea to know your test results and keep a list of the medicines you take. How can you care for yourself at home? · Clean the area with soap and water 2 times a day unless your doctor gives you different instructions. Don't use hydrogen peroxide or alcohol, which can slow healing. ¨ You may cover the wound with a thin layer of antibiotic ointment, such as bacitracin, and a nonstick bandage. ¨ Apply more ointment and replace the bandage as needed. ¨ Plan to keep compression bandage on for next week. ¨ Avoid lifting or strain for 1 week. ¨ Plan radiation consultation. Call Head and Neck surgery office to have paperwork forwarded. · Take pain medicines exactly as directed. Some pain is normal with a wound, but do not ignore pain that is getting worse instead of better. You could have an infection. ¨ If the doctor gave you a prescription medicine for pain, take it as prescribed. ¨ If you are not taking a prescription pain medicine, ask your doctor if you can take an over-the-counter medicine. · Your doctor may have closed your wound with stitches (sutures), staples, or skin glue. ¨ If you have stitches, your doctor may remove them after several days to 2 weeks. Or you may have stitches that dissolve on their own. ¨ If you have staples, your doctor may remove them after 7 to 10 days. ¨ If your wound was closed with skin glue, the glue will wear off in a few days to 2 weeks. When should you call for help?   Call your doctor now or seek immediate medical care if:  · You have signs of infection, such as:  ¨ Increased pain, swelling, warmth, or redness near the wound. ¨ Red streaks leading from the wound. ¨ Pus draining from the wound. ¨ A fever. · You bleed so much from your incision that you soak one or more bandages over 2 to 4 hours. Watch closely for changes in your health, and be sure to contact your doctor if:  · The wound is not getting better each day. Where can you learn more? Go to [employer]  Enter M973 in the search box to learn more about \"Wound Care: After Your Visit. \"   © 7337-3665 [employer]wise, [employer]orporated. Care instructions adapted under license by [facility] [employer] (which disclaims liability or warranty for this information). This care instruction is for use with your licensed healthcare professional. If you have questions about a medical condition or this instruction, always ask your healthcare professional. [place] [address] any warranty or liability for your use of this information. Content Version: 95.2.464906;  Last Revised: April 23, 2012

## 2020-11-14 NOTE — PROGRESS NOTES
TRANSITION OF CARE  RUR--8%  Disposition--Home with family assist.  Transport--Wife    Care Management Interventions  PCP Verified by CM: Yes  Transition of Care Consult (CM Consult): Other(None)  Physical Therapy Consult: No  Occupational Therapy Consult: No  Speech Therapy Consult: No  Current Support Network: Lives with Spouse  The Plan for Transition of Care is Related to the Following Treatment Goals : Home with family assist.  Discharge Location  Discharge Placement: Home with family assistance    Reason for Admission:  S/P MRND/parotidectomy and facial flap reconstruction for recurrent SCC of facial skin. RUR Score:        8%           Plan for utilizing home health:      None    PCP: First and Last name:  Camille Martino MD   Name of Practice:    Are you a current patient: Yes    Approximate date of last visit: 2 weeks ago   Can you participate in a virtual visit with your PCP:                     Current Advanced Directive/Advance Care Plan: Not on file. Transition of Care Plan:                    CM met with patient. [place]. Patient reports good family /social support. Patient is ambulatory and expects return to independent functioning. Patient confirmed PCP, health insurance, and prescription coverage.

## 2020-12-30 NOTE — DISCHARGE SUMMARY
Ear/Nose/Throat Discharge Summary      Patient ID:  Daisy Madison  [id number]  12 y.o.  1940    Allergies: Alendronate sodium, Naproxen, and Singulair [montelukast]    Admit Date: 11/12/2020    Discharge Date: 12/30/2020     Admitting Physician: Sanju Lynn MD     Discharge Physician: Guillermina Granados    * Admission Diagnoses: SCC (squamous cell carcinoma), face [C44.320]    * Discharge Diagnoses:   Hospital Problems as of 11/14/2020 Date Reviewed: 9/3/2013          Codes Class Noted - Resolved POA    SCC (squamous cell carcinoma), face ICD-10-CM: U01.446  ICD-9-CM: 173.32  11/12/2020 - Present Unknown              Admission Condition: good    * Discharged Condition: good    * Hospital Course: Underwent left parotidectomy with neck dissection. * Procedures:   Procedure(s):  TOTAL EXCISION OF LEFT PAROTID GLAND WITH MODIFIED  RADICAL NECK DISSECTION AND TISSUE REARRANGEMENT FOR DEFECT WITH FACIAL NERVE STIMULATION      Consults: None    Significant Diagnostic Studies:   Treatments: surgery:     Discharge Exam:  Patient able to perform ADLs. Neck incision intact and viable. CN intact. * Disposition: Home    Discharge Medications:   Discharge Medication List as of 11/14/2020  9:18 AM          * Follow-up Care/Patient Instructions:    Activity: Activity as tolerated  Diet: Regular Diet  Wound Care: As directed    Follow-up Information     Follow up With Specialties Details Why [address], [address], [address] [place] [address] 81  [address] [facility] [phone]            Signed:  Sanju Lynn MD  12/30/2020  12:46 PM

## 2020-12-30 NOTE — OP NOTES
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 PROCEDURE PERFORMED:  1. Total parotidectomy with facial nerve dissection. 2.  Left modified radical neck dissection. 3.  Cervicofacial bilobed flap reconstruction of the left face:  10 x 8 cm. 4.  Wound debridement in preparation for reconstruction. 5.  Nerve monitoring with a nerve integrity monitor and nerve stimulation. SURGEON:  Vlad Sheikh MD    ASSISTANT:  Surgical assistant. ANESTHESIA:  General endotracheal tube anesthesia. COMPLICATIONS:  No complications. SPECIMENS REMOVED:  Left total parotid and zones II and III of the left neck lymphadenectomy contents. IMPLANTS:  No implant. ESTIMATED BLOOD LOSS:  30 mL. INDICATIONS AND FINDINGS:  The patient has had multiple procedures for excision of squamous cell carcinoma of the left cheek, having had a recent Mohs procedure obtaining negative circumferential margins, but with positive deep central margin photo-documented. Intraoperatively, the tumor was seen to abut the facial nerve superior branches though not overtly invade nor tether nor extend deep and hence nerve-sparing technique anticipating need for postoperative adjuvant radiation therapy as part of the treatment plan with nerve preservation as a goal.    PROCEDURE:  In the operating room, the patient was induced under general endotracheal tube anesthesia following which she was prepped and draped in a sterile fashion. 1% lidocaine with 1:100,000 part epinephrine was used for local infiltration. Left preauricular incision was made curving around the lobe into the mastoid area planning a bilobed reconstruction and then curving into the mid cervical rhytid.   A [facility] and subplatysmal flap was elevated with Bovie cautery in the neck and elevated sharply in the sub-SMAS of the face anticipating the flap reconstruction retracted with sutures. A preauricular dissection was made to debulk  the parotid capsule from the cartilage and identifying the facial nerve main trunk emerging from stylomastoid foramen and following this out to protect its main trunk and more peripheral branches. This dissection continued using bipolar cautery over the parotid tissue keeping a cuff of tissue separate from the palpable tumor in the superficial lobe, more superior in the gland. Dissecting out the temporal and zygomatic branches, the tumor  closely, but was not adherent to the nerve branches and so nerve sacrifice was not performed, lifting the parotid from this area dissecting around the nerves to obtain deep parotid gland tissue and resecting the gland anteriorly from the buckled fascia and handing this off for the specimen, converting to a more superficial parotid gland plane inferiorly. Neck dissection was next performed skeletonizing the sternocleidomastoid muscle and identifying the eleventh cranial nerve and tracing this to the digastric muscle. The digastric tunnel was then identified and traced with Bovie cautery anteriorly. Inferiorly, the sternohyoid muscle was identified and skeletonized reflecting the zone III contents from the jugular vein centrally and the posterior fascia over the cervical rootlet branches in the posterior aspect of the dissection. Zone IIb was dissected from the digastric and trapezius reflecting this tissue under the eleventh cranial nerve. The dissection was completed dividing the tissue from the para laryngeal tissue and great vessels anteriorly handing off zones IIa, IIb and zone III for pathologic analysis. The cervicofacial bilobed flap was then further elevated in the subplatysmal plane to the clavicle allowing rotation advancement. This was likely more challenging because of the multiple previous facial excisions. This was inset with 3-0 Vicryl in the SMAS and platysmal plane, closing the skin, zipping closed with staples. Wounds were cleaned and antibiotic ointment placed. A #7 MAY drain was placed exiting the mastoid area, secured with a nylon suture prior to flap closure. The patient was then handed over to Anesthesia for awakening and transfer to the recovery room.       MD ERYN [id number]  D:  12/30/2020 12:32  T:  12/30/2020 13:19  JOB #:  [id number]  CC:  [facility]

## 2021-03-03 ENCOUNTER — TRANSCRIBE ORDER (OUTPATIENT)
Dept: SCHEDULING | Age: 81
End: 2021-03-03

## 2021-03-03 DIAGNOSIS — C44.329 SQUAMOUS CELL CANCER OF SKIN OF LEFT CHEEK: Primary | ICD-10-CM

## 2021-03-04 ENCOUNTER — HOSPITAL ENCOUNTER (OUTPATIENT)
Dept: ULTRASOUND IMAGING | Age: 81
Discharge: HOME | End: 2021-03-04
Attending: OTOLARYNGOLOGY
Payer: MEDICARE

## 2021-03-04 DIAGNOSIS — C44.329 SQUAMOUS CELL CANCER OF SKIN OF LEFT CHEEK: ICD-10-CM

## 2021-03-04 PROCEDURE — 74011000250 HC RX REV CODE- 250: Performed by: RADIOLOGY

## 2021-03-04 PROCEDURE — 77030014115 US GUIDE BX SALIV GLAND PERC NDL

## 2021-03-04 PROCEDURE — 10005 FNA BX W/US GDN 1ST LES: CPT

## 2021-03-04 PROCEDURE — 88305 TISSUE EXAM BY PATHOLOGIST: CPT

## 2021-03-04 RX ORDER — LIDOCAINE HYDROCHLORIDE 10 MG/ML
10 INJECTION, SOLUTION EPIDURAL; INFILTRATION; INTRACAUDAL; PERINEURAL
Status: COMPLETED | OUTPATIENT
Start: 2021-03-04 | End: 2021-03-04

## 2021-03-04 RX ADMIN — LIDOCAINE HYDROCHLORIDE 10 ML: 10 INJECTION, SOLUTION EPIDURAL; INFILTRATION; INTRACAUDAL; PERINEURAL at 11:00

## 2021-12-20 ENCOUNTER — HOSPITAL ENCOUNTER (INPATIENT)
Age: 81
LOS: 8 days | Discharge: HOME | DRG: 417 | End: 2021-12-28
Attending: FAMILY MEDICINE | Admitting: FAMILY MEDICINE
Payer: MEDICARE

## 2021-12-20 DIAGNOSIS — K80.50 CHOLEDOCHOLITHIASIS: ICD-10-CM

## 2021-12-20 DIAGNOSIS — I25.10 CORONARY ARTERY DISEASE INVOLVING NATIVE CORONARY ARTERY OF NATIVE HEART WITHOUT ANGINA PECTORIS: Primary | ICD-10-CM

## 2021-12-20 DIAGNOSIS — K81.9 CHOLECYSTITIS: ICD-10-CM

## 2021-12-20 PROCEDURE — 65660000000 HC RM CCU STEPDOWN

## 2021-12-21 ENCOUNTER — APPOINTMENT (OUTPATIENT)
Dept: NON INVASIVE DIAGNOSTICS | Age: 81
DRG: 417 | End: 2021-12-21
Attending: STUDENT IN AN ORGANIZED HEALTH CARE EDUCATION/TRAINING PROGRAM
Payer: MEDICARE

## 2021-12-21 PROBLEM — K81.9 CHOLECYSTITIS: Status: ACTIVE | Noted: 2021-12-21

## 2021-12-21 LAB
ALBUMIN SERPL-MCNC: 2.6 G/DL (ref 3.5–5)
ALBUMIN/GLOB SERPL: 0.9 {RATIO} (ref 1.1–2.2)
ALP SERPL-CCNC: 468 U/L (ref 45–117)
ALT SERPL-CCNC: 354 U/L (ref 12–78)
ANION GAP SERPL CALC-SCNC: 6 MMOL/L (ref 5–15)
APTT PPP: 46.8 SEC (ref 22.1–31)
AST SERPL-CCNC: 223 U/L (ref 15–37)
BASOPHILS # BLD: 0 K/UL (ref 0–0.1)
BASOPHILS NFR BLD: 0 % (ref 0–1)
BILIRUB SERPL-MCNC: 2.6 MG/DL (ref 0.2–1)
BUN SERPL-MCNC: 23 MG/DL (ref 6–20)
BUN/CREAT SERPL: 26 (ref 12–20)
CALCIUM SERPL-MCNC: 8.4 MG/DL (ref 8.5–10.1)
CHLORIDE SERPL-SCNC: 107 MMOL/L (ref 97–108)
CO2 SERPL-SCNC: 22 MMOL/L (ref 21–32)
COMMENT, HOLDF: NORMAL
COVID-19 RAPID TEST, COVR: NOT DETECTED
CREAT SERPL-MCNC: 0.89 MG/DL (ref 0.7–1.3)
DIFFERENTIAL METHOD BLD: ABNORMAL
ECHO AO ROOT DIAM: 3.9 CM
ECHO AO ROOT INDEX: 2.19 CM/M2
ECHO AV AREA PEAK VELOCITY: 1.6 CM2
ECHO AV AREA/BSA PEAK VELOCITY: 0.9 CM2/M2
ECHO AV PEAK GRADIENT: 20 MMHG
ECHO AV PEAK VELOCITY: 2.3 M/S
ECHO AV VELOCITY RATIO: 0.43
ECHO LA DIAMETER INDEX: 2.36 CM/M2
ECHO LA DIAMETER: 4.2 CM
ECHO LA TO AORTIC ROOT RATIO: 1.08
ECHO LA VOL 2C: 53 ML (ref 18–58)
ECHO LA VOL 4C: 68 ML (ref 18–58)
ECHO LA VOLUME AREA LENGTH: 64 ML
ECHO LA VOLUME INDEX A2C: 30 ML/M2 (ref 16–34)
ECHO LA VOLUME INDEX A4C: 38 ML/M2 (ref 16–34)
ECHO LA VOLUME INDEX AREA LENGTH: 36 ML/M2 (ref 16–34)
ECHO LV E' LATERAL VELOCITY: 8 CM/S
ECHO LV E' SEPTAL VELOCITY: 5 CM/S
ECHO LV EDV A2C: 178 ML
ECHO LV EDV A4C: 219 ML
ECHO LV EDV BP: 205 ML (ref 67–155)
ECHO LV EDV BP: 205 ML (ref 67–155)
ECHO LV EDV INDEX A4C: 123 ML/M2
ECHO LV EDV NDEX A2C: 100 ML/M2
ECHO LV EJECTION FRACTION A2C: 36 %
ECHO LV EJECTION FRACTION A4C: 46 %
ECHO LV EJECTION FRACTION BIPLANE: 43 % (ref 55–100)
ECHO LV EJECTION FRACTION BIPLANE: 43 % (ref 55–100)
ECHO LV ESV A2C: 115 ML
ECHO LV ESV A4C: 118 ML
ECHO LV ESV BP: 117 ML (ref 22–58)
ECHO LV ESV INDEX A2C: 65 ML/M2
ECHO LV ESV INDEX A4C: 66 ML/M2
ECHO LV ESV INDEX BP: 66 ML/M2
ECHO LV FRACTIONAL SHORTENING: 10 % (ref 28–44)
ECHO LV INTERNAL DIMENSION DIASTOLE INDEX: 3.54 CM/M2
ECHO LV INTERNAL DIMENSION DIASTOLIC: 6.3 CM (ref 4.2–5.9)
ECHO LV INTERNAL DIMENSION SYSTOLIC INDEX: 3.2 CM/M2
ECHO LV INTERNAL DIMENSION SYSTOLIC: 5.7 CM
ECHO LV IVSD: 1.3 CM (ref 0.6–1)
ECHO LV MASS 2D: 321.8 G (ref 88–224)
ECHO LV MASS INDEX 2D: 180.8 G/M2 (ref 49–115)
ECHO LV POSTERIOR WALL DIASTOLIC: 1 CM (ref 0.6–1)
ECHO LV RELATIVE WALL THICKNESS RATIO: 0.32
ECHO LVOT AREA: 3.8 CM2
ECHO LVOT DIAM: 2.2 CM
ECHO LVOT PEAK GRADIENT: 4 MMHG
ECHO LVOT PEAK VELOCITY: 1 M/S
ECHO MV A VELOCITY: 1.02 M/S
ECHO MV AREA PHT: 3.6 CM2
ECHO MV E DECELERATION TIME (DT): 212.8 MS
ECHO MV E VELOCITY: 0.68 M/S
ECHO MV E/A RATIO: 0.67
ECHO MV E/E' LATERAL: 8.5
ECHO MV E/E' RATIO (AVERAGED): 11.05
ECHO MV E/E' SEPTAL: 13.6
ECHO MV PRESSURE HALF TIME (PHT): 61.7 MS
ECHO MV REGURGITANT PEAK GRADIENT: 207 MMHG
ECHO MV REGURGITANT PEAK VELOCITY: 7.2 M/S
ECHO PV MAX VELOCITY: 1.3 M/S
ECHO PV PEAK GRADIENT: 6 MMHG
ECHO RV FREE WALL PEAK S': 12 CM/S
ECHO RV INTERNAL DIMENSION: 4.8 CM
ECHO RV TAPSE: 2.1 CM (ref 1.5–2)
ECHO TV REGURGITANT MAX VELOCITY: 2.63 M/S
ECHO TV REGURGITANT PEAK GRADIENT: 28 MMHG
EOSINOPHIL # BLD: 0 K/UL (ref 0–0.4)
EOSINOPHIL NFR BLD: 0 % (ref 0–7)
ERYTHROCYTE [DISTWIDTH] IN BLOOD BY AUTOMATED COUNT: 18.3 % (ref 11.5–14.5)
FIBRINOGEN PPP-MCNC: 378 MG/DL (ref 200–475)
GLOBULIN SER CALC-MCNC: 2.8 G/DL (ref 2–4)
GLUCOSE SERPL-MCNC: 55 MG/DL (ref 65–100)
HCT VFR BLD AUTO: 23.7 % (ref 36.6–50.3)
HGB BLD-MCNC: 8 G/DL (ref 12.1–17)
IMM GRANULOCYTES # BLD AUTO: 0.1 K/UL (ref 0–0.04)
IMM GRANULOCYTES NFR BLD AUTO: 1 % (ref 0–0.5)
INR PPP: 1.7 (ref 0.9–1.1)
LIPASE SERPL-CCNC: >3000 U/L (ref 73–393)
LYMPHOCYTES # BLD: 0.2 K/UL (ref 0.8–3.5)
LYMPHOCYTES NFR BLD: 4 % (ref 12–49)
MCH RBC QN AUTO: 33.6 PG (ref 26–34)
MCHC RBC AUTO-ENTMCNC: 33.8 G/DL (ref 30–36.5)
MCV RBC AUTO: 99.6 FL (ref 80–99)
MONOCYTES # BLD: 0.7 K/UL (ref 0–1)
MONOCYTES NFR BLD: 11 % (ref 5–13)
NEUTS SEG # BLD: 5.1 K/UL (ref 1.8–8)
NEUTS SEG NFR BLD: 84 % (ref 32–75)
NRBC # BLD: 0 K/UL (ref 0–0.01)
NRBC BLD-RTO: 0 PER 100 WBC
PLATELET # BLD AUTO: 57 K/UL (ref 150–400)
PMV BLD AUTO: 11.7 FL (ref 8.9–12.9)
POTASSIUM SERPL-SCNC: 3.8 MMOL/L (ref 3.5–5.1)
PROT SERPL-MCNC: 5.4 G/DL (ref 6.4–8.2)
PROTHROMBIN TIME: 17.5 SEC (ref 9–11.1)
RBC # BLD AUTO: 2.38 M/UL (ref 4.1–5.7)
RBC MORPH BLD: ABNORMAL
SAMPLES BEING HELD,HOLD: NORMAL
SODIUM SERPL-SCNC: 135 MMOL/L (ref 136–145)
SOURCE, COVRS: NORMAL
THERAPEUTIC RANGE,PTTT: ABNORMAL SECS (ref 58–77)
TROPONIN-HIGH SENSITIVITY: 374 NG/L (ref 0–76)
TROPONIN-HIGH SENSITIVITY: 417 NG/L (ref 0–76)
WBC # BLD AUTO: 6.1 K/UL (ref 4.1–11.1)

## 2021-12-21 PROCEDURE — 74011000258 HC RX REV CODE- 258: Performed by: NURSE PRACTITIONER

## 2021-12-21 PROCEDURE — 74011000258 HC RX REV CODE- 258: Performed by: FAMILY MEDICINE

## 2021-12-21 PROCEDURE — 83690 ASSAY OF LIPASE: CPT

## 2021-12-21 PROCEDURE — 36415 COLL VENOUS BLD VENIPUNCTURE: CPT

## 2021-12-21 PROCEDURE — 93005 ELECTROCARDIOGRAM TRACING: CPT

## 2021-12-21 PROCEDURE — 74011250636 HC RX REV CODE- 250/636: Performed by: FAMILY MEDICINE

## 2021-12-21 PROCEDURE — 85025 COMPLETE CBC W/AUTO DIFF WBC: CPT

## 2021-12-21 PROCEDURE — 74011250637 HC RX REV CODE- 250/637: Performed by: FAMILY MEDICINE

## 2021-12-21 PROCEDURE — 74011250637 HC RX REV CODE- 250/637: Performed by: INTERNAL MEDICINE

## 2021-12-21 PROCEDURE — 99221 1ST HOSP IP/OBS SF/LOW 40: CPT | Performed by: NURSE PRACTITIONER

## 2021-12-21 PROCEDURE — 65270000029 HC RM PRIVATE

## 2021-12-21 PROCEDURE — 74011250636 HC RX REV CODE- 250/636: Performed by: NURSE PRACTITIONER

## 2021-12-21 PROCEDURE — 84484 ASSAY OF TROPONIN QUANT: CPT

## 2021-12-21 PROCEDURE — 85730 THROMBOPLASTIN TIME PARTIAL: CPT

## 2021-12-21 PROCEDURE — 93306 TTE W/DOPPLER COMPLETE: CPT

## 2021-12-21 PROCEDURE — 80053 COMPREHEN METABOLIC PANEL: CPT

## 2021-12-21 PROCEDURE — 87635 SARS-COV-2 COVID-19 AMP PRB: CPT

## 2021-12-21 RX ORDER — ASPIRIN 81 MG/1
81 TABLET ORAL DAILY
Status: DISCONTINUED | OUTPATIENT
Start: 2021-12-22 | End: 2021-12-28 | Stop reason: HOSPADM

## 2021-12-21 RX ORDER — ACETAMINOPHEN 325 MG/1
650 TABLET ORAL
Status: DISCONTINUED | OUTPATIENT
Start: 2021-12-21 | End: 2021-12-24

## 2021-12-21 RX ORDER — METOPROLOL SUCCINATE 25 MG/1
25 TABLET, EXTENDED RELEASE ORAL DAILY
COMMUNITY

## 2021-12-21 RX ORDER — HYDROCHLOROTHIAZIDE 12.5 MG/1
12.5 TABLET ORAL DAILY
COMMUNITY

## 2021-12-21 RX ORDER — PREDNISONE 1 MG/1
2 TABLET ORAL
Status: DISCONTINUED | OUTPATIENT
Start: 2021-12-22 | End: 2021-12-28 | Stop reason: HOSPADM

## 2021-12-21 RX ORDER — AMLODIPINE BESYLATE 5 MG/1
5 TABLET ORAL EVERY EVENING
Status: DISCONTINUED | OUTPATIENT
Start: 2021-12-21 | End: 2021-12-21

## 2021-12-21 RX ORDER — SODIUM CHLORIDE 0.9 % (FLUSH) 0.9 %
5-40 SYRINGE (ML) INJECTION EVERY 8 HOURS
Status: DISCONTINUED | OUTPATIENT
Start: 2021-12-21 | End: 2021-12-28 | Stop reason: HOSPADM

## 2021-12-21 RX ORDER — HYDROXYCHLOROQUINE SULFATE 200 MG/1
200 TABLET, FILM COATED ORAL 2 TIMES DAILY
Status: DISCONTINUED | OUTPATIENT
Start: 2021-12-21 | End: 2021-12-28 | Stop reason: HOSPADM

## 2021-12-21 RX ORDER — ACETAMINOPHEN 650 MG/1
650 SUPPOSITORY RECTAL
Status: DISCONTINUED | OUTPATIENT
Start: 2021-12-21 | End: 2021-12-24

## 2021-12-21 RX ORDER — LANOLIN ALCOHOL/MO/W.PET/CERES
400 CREAM (GRAM) TOPICAL ONCE
Status: COMPLETED | OUTPATIENT
Start: 2021-12-21 | End: 2021-12-21

## 2021-12-21 RX ORDER — WARFARIN 2 MG/1
TABLET ORAL
COMMUNITY

## 2021-12-21 RX ORDER — METOPROLOL SUCCINATE 25 MG/1
25 TABLET, EXTENDED RELEASE ORAL DAILY
Status: DISCONTINUED | OUTPATIENT
Start: 2021-12-22 | End: 2021-12-28 | Stop reason: HOSPADM

## 2021-12-21 RX ORDER — ACETAMINOPHEN 500 MG
500 TABLET ORAL
COMMUNITY

## 2021-12-21 RX ORDER — ALLOPURINOL 300 MG/1
300 TABLET ORAL DAILY
COMMUNITY

## 2021-12-21 RX ORDER — DEXTROSE, SODIUM CHLORIDE, AND POTASSIUM CHLORIDE 5; .45; .15 G/100ML; G/100ML; G/100ML
75 INJECTION INTRAVENOUS CONTINUOUS
Status: DISCONTINUED | OUTPATIENT
Start: 2021-12-21 | End: 2021-12-22

## 2021-12-21 RX ORDER — GABAPENTIN 300 MG/1
300 CAPSULE ORAL 2 TIMES DAILY
COMMUNITY

## 2021-12-21 RX ORDER — PREDNISONE 1 MG/1
2 TABLET ORAL DAILY
COMMUNITY

## 2021-12-21 RX ORDER — POLYVINYL ALCOHOL 14 MG/ML
1 SOLUTION/ DROPS OPHTHALMIC 2 TIMES DAILY
COMMUNITY

## 2021-12-21 RX ORDER — ASPIRIN 325 MG
325 TABLET ORAL DAILY
Status: DISCONTINUED | OUTPATIENT
Start: 2021-12-22 | End: 2021-12-21

## 2021-12-21 RX ORDER — GABAPENTIN 300 MG/1
300 CAPSULE ORAL 2 TIMES DAILY
Status: DISCONTINUED | OUTPATIENT
Start: 2021-12-21 | End: 2021-12-28 | Stop reason: HOSPADM

## 2021-12-21 RX ORDER — ASPIRIN 81 MG/1
81 TABLET ORAL DAILY
COMMUNITY

## 2021-12-21 RX ORDER — OMEPRAZOLE 40 MG/1
40 CAPSULE, DELAYED RELEASE ORAL
COMMUNITY

## 2021-12-21 RX ORDER — CALCIUM CARBONATE/VITAMIN D3 600MG-5MCG
1 TABLET ORAL EVERY EVENING
COMMUNITY

## 2021-12-21 RX ORDER — LATANOPROST 50 UG/ML
1 SOLUTION/ DROPS OPHTHALMIC
COMMUNITY

## 2021-12-21 RX ORDER — SODIUM CHLORIDE 0.9 % (FLUSH) 0.9 %
5-40 SYRINGE (ML) INJECTION AS NEEDED
Status: DISCONTINUED | OUTPATIENT
Start: 2021-12-21 | End: 2021-12-28 | Stop reason: HOSPADM

## 2021-12-21 RX ORDER — MELATONIN
1000 DAILY
COMMUNITY

## 2021-12-21 RX ORDER — LISINOPRIL 10 MG/1
10 TABLET ORAL EVERY EVENING
COMMUNITY

## 2021-12-21 RX ADMIN — GABAPENTIN 300 MG: 300 CAPSULE ORAL at 18:37

## 2021-12-21 RX ADMIN — Medication 10 ML: at 14:00

## 2021-12-21 RX ADMIN — Medication 10 ML: at 06:12

## 2021-12-21 RX ADMIN — HYDROXYCHLOROQUINE SULFATE 200 MG: 200 TABLET ORAL at 14:40

## 2021-12-21 RX ADMIN — PIPERACILLIN SODIUM AND TAZOBACTAM SODIUM 3.38 G: 3; .375 INJECTION, POWDER, LYOPHILIZED, FOR SOLUTION INTRAVENOUS at 08:27

## 2021-12-21 RX ADMIN — Medication 400 MG: at 06:11

## 2021-12-21 RX ADMIN — POTASSIUM CHLORIDE, DEXTROSE MONOHYDRATE AND SODIUM CHLORIDE 75 ML/HR: 150; 5; 450 INJECTION, SOLUTION INTRAVENOUS at 06:12

## 2021-12-21 RX ADMIN — HYDROXYCHLOROQUINE SULFATE 200 MG: 200 TABLET ORAL at 18:37

## 2021-12-21 RX ADMIN — POTASSIUM CHLORIDE, DEXTROSE MONOHYDRATE AND SODIUM CHLORIDE 75 ML/HR: 150; 5; 450 INJECTION, SOLUTION INTRAVENOUS at 20:05

## 2021-12-21 RX ADMIN — PIPERACILLIN SODIUM AND TAZOBACTAM SODIUM 3.38 G: 3; .375 INJECTION, POWDER, LYOPHILIZED, FOR SOLUTION INTRAVENOUS at 15:49

## 2021-12-21 RX ADMIN — PHYTONADIONE 5 MG: 10 INJECTION, EMULSION INTRAMUSCULAR; INTRAVENOUS; SUBCUTANEOUS at 16:50

## 2021-12-21 NOTE — PROGRESS NOTES
Transition of Care Plan   RUR- 9% Moderate Risk   DISPOSITION: The disposition plan is pending medical progression and recommendation.  F/U with PCP/Specialist     Transport: [facility]/family     Reason for Admission:  \"pain in side and infection in gallbladder. \"                    RUR Score: 9% Moderate Risk                    Plan for utilizing home health: N/A         PCP: First and Last name:  Clemon Riedel, MD     Name of Practice: [facility] you a current patient: Yes/No: Yes   Approximate date of last visit: Week ago   Can you participate in a virtual visit with your PCP: N/A                    Current Advanced Directive/Advance Care Plan: Full Code  Has no ACP documentation on file at this time. Healthcare Decision Maker:   Click here to complete [address] including selection of the Healthcare Decision Maker Relationship (ie \"Primary\")  Spouse                         Transition of Care Plan:  Pending recommendation. Reviewed chart for transitions of care and discussed in rounds. CM met with patient at bedside to explain role and offer support. Patient is alert and oriented x4 and confirmed demographics. Baseline: DME - none  ADLs/IDALS: Independent   Previous Home Health: N/A  Previous SNF/IPR: N/A  ER Contact: Spouse Benson Guzman - [phone]    Patient lives in a 1 level house with 4 steps to enter. Patient lives with his spouse. Patient is independent with ADLs/IDALs. Patient does not utilize DMEs to ambulate. Patient's preferred pharmacy is [facility] located [facility] for his perscriptions. Patient's spouse is expected to transport at discharge. Care Management Interventions  PCP Verified by CM: Yes  Palliative Care Criteria Met (RRAT>21 & CHF Dx)?: No  Mode of Transport at Discharge:  Other (see comment)  Transition of Care Consult (CM Consult): Discharge Planning  MyChart Signup: No  Discharge Durable Medical Equipment: No  Physical Therapy Consult: No  Occupational Therapy Consult: No  Speech Therapy Consult: No  Support Systems: Spouse/Significant Other  Confirm Follow Up Transport: Family  The Patient and/or Patient Representative was Provided with a Choice of Provider and Agrees with the Discharge Plan?: Yes  Freedom of Choice List was Provided with Basic Dialogue that Supports the Patient's Individualized Plan of Care/Goals, Treatment Preferences and Shares the Quality Data Associated with the Providers?: Yes  [place] Resource Information Provided?: No    CM will continue to provide updates as they become available. CM will continue to follow, provide support and assist with [facility] needs as they arise.     IVAN Bai, CRM, LMHP-e

## 2021-12-21 NOTE — H&P
[address] [facility] Adult  Hospitalist Group  History and Physical    Primary Care Provider: Yves Foote MD  Date of Service:  12/21/2021    Subjective:     Aimee Sweeney is a 80 y.o. male with ap mhx CAD, oral cancer, hypertension, and lupus who presents as a transfer for abdominal pain, and is being admitted for cholecystitis. He states that he has been experiencing right upper quadrant pain intermittently for the past 3 weeks with some improvement with Tums. The day prior to admission, he started to develop chills, and alteration in his mental status so was taken to the ED for evaluation. In the [address] [place] [address] ED, he was found to have a CT finding of CBD dilated to 11 mm, pericholecystic fluid, and thickened gallbladder wall. He was treated with Zosyn and sent to [facility] for further care. On arrival, he was hemodynamically stable. Labs were significant for , , T bili 5.1 alk phos 434, and lipase 180. Review of Systems:    All other review of systems were negative except for that written in the History of Present Illness. Past Medical History:   Diagnosis Date    Arthritis     CAD (coronary artery disease) 1998    5 stents    Cancer ([address] 2020    squamous cell, basal cell, melanoma    Coagulation disorder ([address]     blood clotting d/o from taking naproxen    Hypertension     Lupus [facility]     Thromboembolus ([facility]       Past Surgical History:   Procedure Laterality Date   [facility]    stents placed    HX CATARACT REMOVAL Bilateral 2010    HX OTHER SURGICAL      multiple spots of skin cancer removal    HX PACEMAKER  1998    VASCULAR SURGERY PROCEDURE UNLIST  2017    IVC filter     Prior to Admission medications    Medication Sig Start Date End Date Taking? Authorizing Provider   bisoprolol-hydroCHLOROthiazide [facility]) 2.5-6.25 mg per tablet Take 1 Tab by mouth daily.     Provider, Historical   mycophenolate (CELLCEPT) 500 mg tablet Take 500 mg by mouth every evening. Provider, Historical   hydrOXYchloroQUINE (PLAQUENIL) 200 mg tablet Take 200 mg by mouth two (2) times a day. Provider, Historical   predniSONE (DELTASONE) 1 mg tablet Take 1 mg by mouth daily (with breakfast). Provider, Historical   warfarin (COUMADIN) 4 mg tablet Take 4 mg by mouth every evening. Provider, Historical   potassium chloride SR (KLOR-CON 10) 10 mEq tablet Take  by mouth every evening. Provider, Historical   ferrous fumarate/vit Bcomp,C (SUPER B COMPLEX PO) Take  by mouth. Provider, Historical   Calcium-Cholecalciferol, D3, (Calcium 600 with Vitamin D3) 600 mg(1,500mg) -400 unit chew Take  by mouth. Provider, Historical   gabapentin (NEURONTIN) 100 mg capsule Take 100 mg by mouth two (2) times a day. Provider, Historical   acetylcysteine (NAC) 600 mg cap capsule Take 600 mg by mouth. Provider, Historical   allopurinol (ZYLOPRIM) 300 mg tablet TAKE 1 TABLET DAILY 12/15/15   Keturah Blackwood MD   omeprazole (PRILOSEC) 20 mg capsule TAKE 1 CAPSULE DAILY  Patient taking differently: 40 mg daily (with lunch). 5/19/15   Keturah Blackwood MD   atorvastatin (LIPITOR) 20 mg tablet Take 40 mg by mouth every evening. Provider, Historical   amLODIPine (NORVASC) 5 mg tablet Take 5 mg by mouth every evening. Provider, Historical   varicella zoster vacine live (VARICELLA-ZOSTER VIRUS INFECTION PROPHYLAXIS) 19,400 unit SolR injection 1 Vial by SubCUTAneous route once as needed for 1 dose.  8/23/12   Keturah Blackwood MD     Allergies   Allergen Reactions    Alendronate Sodium Other (comments)     SEVERE PAIN    Naproxen Other (comments)     CAUSED BLOOD CLOTS    Singulair [Montelukast] Other (comments)     SEVERE HEADACHES      Family History   Problem Relation Age of Onset    No Known Problems Mother     Stroke Father     Cancer Sister     No Known Problems Brother     Heart Disease Brother     Cancer Brother     No Known Problems Brother     Anesth Problems Neg Hx         SOCIAL HISTORY:  Patient resides at Home. Smoking history: former  Alcohol history former  Objective:       Physical Exam:   Visit Vitals  BP (!) 141/76 (BP 1 Location: Right upper arm, BP Patient Position: At rest)   Pulse 67   Temp 98.5 °F (36.9 °C)   Resp 16   Ht 5' 7\" (1.702 m)   Wt 67.1 kg (148 lb)   SpO2 100%   BMI 23.18 kg/m²     General:  Alert, cooperative, no distress, appears stated age. Head:  Normocephalic, without obvious abnormality, atraumatic. Eyes:  Conjunctivae/corneas clear. EOMs intact. Ears:  Normal TMs and external ear canals both ears. Nose: Nares normal. Septum midline. Throat: Lips, mucosa, and tongue normal.    Neck: Supple, symmetrical, trachea midline   Back:   Symmetric, no curvature. ROM normal.    Lungs:   Clear to auscultation bilaterally. Chest wall:  No tenderness or deformity. Heart:  Regular rate and rhythm, S1, S2 normal, no murmur, click, rub or gallop. Abdomen:   Soft, non-tender, no rebound, no gaurding. Bowel sounds normal. No masses,  No organomegaly. Extremities: Extremities normal, atraumatic, no cyanosis or edema. Pulses: 2+ radial pulses   Skin: Skin color, texture, turgor normal. No rashes or lesions. Data Review: All diagnostic labs and studies have been reviewed. Assessment:     Active Problems:    * No active hospital problems.  *      Plan:     #Acute Cholecystitis with dilated CBD  -MRCP, wife bringing PM card to see if MRI compatible  -consult GI and gen surg  -continue zosyn  -pain control  -NPO, maintenance IVF    #CAD  #HTN  -med rec requested    #lupus  -med rec requested    #oral cancer  -med rec requested    FEN: NPO  dvt ppx: SCD  MPOA: spouse  Code: Full      Signed By: Phyllis Stallworth MD     December 21, 2021

## 2021-12-21 NOTE — CONSULTS
[facility]  Inpatient Consultation        Admit Date: 12/20/2021  Reason for Consultation: cholecystitis    HPI:  Ирина Bobby is a 80 y.o. male w/ hx CAD, multiple stents - heart, aorta, iliacs (on coumadin), AICD, IVC filter whom we are asked to see in consultation by Dr. Selina Sawyer for the above complaint. Pt reports hx of RUQ abd pain that comes and goes for the last few months. Sunday the pain intensified and didn't resolve on it's own. He had nausea a/w vomiting x2. He went to [facility] and had a CT scan done which showed cholecystitis, labs showed elevated LFTs, T bili 2.8, was 5.1 yesterday. He also had a troponin of .17 and repeated was . 33. He denies any cp at present    CT [place] [facility] yesterday   1. Infrarenal abdominal aortic aneurysm 6.2 cm AP. No acute rupture. 2. Multifocal dilatation iliac arteries greatest right common iliac artery 2.3  cm transverse. 3. Infrarenal IVC filter. 4. Right renal artery stenosis with attenuated right renal nephrogram. This  stenosis is difficult to quantify because of slice thickness. 5. Retroperitoneal left para-aortic mild lymphadenopathy measuring up to 1.5  Cm.   Distended gallbladder        Patient Active Problem List    Diagnosis Date Noted    Cholecystitis 12/21/2021    SCC (squamous cell carcinoma), face 11/12/2020    Hypertension 08/23/2012    Rhinitis 08/23/2012    CAD (coronary artery disease) 08/23/2012    Gout 08/23/2012    Hyperlipidemia 08/23/2012     Past Medical History:   Diagnosis Date    Arthritis     CAD (coronary artery disease) 1998    5 stents    Cancer ([address] 2020    squamous cell, basal cell, melanoma    Coagulation disorder ([address]     blood clotting d/o from taking naproxen    Hypertension     Lupus ([address]     Thromboembolus ([address]       Past Surgical History:   Procedure Laterality Date   [facility]    stents placed    HX CATARACT REMOVAL Bilateral 2010    HX OTHER SURGICAL      multiple spots of skin cancer removal    HX PACEMAKER      VASCULAR SURGERY PROCEDURE UNLIST  2017    IVC filter      Social History     Tobacco Use    Smoking status: Former Smoker     Packs/day: 1.00     Years: 25.00     Pack years: 25.00     Quit date: 1994     Years since quittin.9    Smokeless tobacco: Never Used   Substance Use Topics    Alcohol use: Never      Family History   Problem Relation Age of Onset    No Known Problems Mother     Stroke Father     Cancer Sister     No Known Problems Brother     Heart Disease Brother     Cancer Brother     No Known Problems Brother     Anesth Problems Neg Hx       Prior to Admission medications    Medication Sig Start Date End Date Taking? Authorizing Provider   bisoprolol-hydroCHLOROthiazide [facility]) 2.5-6.25 mg per tablet Take 1 Tab by mouth daily. Provider, Historical   mycophenolate (CELLCEPT) 500 mg tablet Take 500 mg by mouth every evening. Provider, Historical   hydrOXYchloroQUINE (PLAQUENIL) 200 mg tablet Take 200 mg by mouth two (2) times a day. Provider, Historical   predniSONE (DELTASONE) 1 mg tablet Take 1 mg by mouth daily (with breakfast). Provider, Historical   warfarin (COUMADIN) 4 mg tablet Take 4 mg by mouth every evening. Provider, Historical   potassium chloride SR (KLOR-CON 10) 10 mEq tablet Take  by mouth every evening. Provider, Historical   ferrous fumarate/vit Bcomp,C (SUPER B COMPLEX PO) Take  by mouth. Provider, Historical   Calcium-Cholecalciferol, D3, (Calcium 600 with Vitamin D3) 600 mg(1,500mg) -400 unit chew Take  by mouth. Provider, Historical   gabapentin (NEURONTIN) 100 mg capsule Take 100 mg by mouth two (2) times a day. Provider, Historical   acetylcysteine (NAC) 600 mg cap capsule Take 600 mg by mouth.     Provider, Historical   allopurinol (ZYLOPRIM) 300 mg tablet TAKE 1 TABLET DAILY 12/15/15   Justin Gonzalez MD   omeprazole [facility]) 20 mg capsule TAKE 1 CAPSULE DAILY  Patient taking differently: 40 mg daily (with lunch). 5/19/15   India Garibay MD   atorvastatin (LIPITOR) 20 mg tablet Take 40 mg by mouth every evening. Provider, Historical   amLODIPine (NORVASC) 5 mg tablet Take 5 mg by mouth every evening. Provider, Historical   varicella zoster vacine live (VARICELLA-ZOSTER VIRUS INFECTION PROPHYLAXIS) 19,400 unit SolR injection 1 Vial by SubCUTAneous route once as needed for 1 dose. 12   India Garibay MD     Current Facility-Administered Medications   Medication Dose Route Frequency    sodium chloride (NS) flush 5-40 mL  5-40 mL IntraVENous Q8H    sodium chloride (NS) flush 5-40 mL  5-40 mL IntraVENous PRN    acetaminophen (TYLENOL) tablet 650 mg  650 mg Oral Q6H PRN    Or    acetaminophen (TYLENOL) suppository 650 mg  650 mg Rectal Q6H PRN    dextrose 5% - 0.45% NaCl with KCl 20 mEq/L infusion  75 mL/hr IntraVENous CONTINUOUS    piperacillin-tazobactam (ZOSYN) 3.375 g in 0.9% sodium chloride (MBP/ADV) 100 mL MBP  3.375 g IntraVENous Q8H     Allergies   Allergen Reactions    Alendronate Sodium Other (comments)     SEVERE PAIN    Naproxen Other (comments)     CAUSED BLOOD CLOTS    Singulair [Montelukast] Other (comments)     SEVERE HEADACHES          Subjective:     Review of Systems:    A comprehensive review of systems was negative except for that written in the History of Present Illness. Objective:     Blood pressure (!) 157/73, pulse 66, temperature 97.8 °F (36.6 °C), resp. rate 16, height 5' 7\" (1.702 m), weight 148 lb (67.1 kg), SpO2 97 %. Temp (24hrs), Av.4 °F (36.9 °C), Min:97.8 °F (36.6 °C), Max:98.7 °F (37.1 °C)      No results for input(s): WBC, HGB, HCT, PLT, HGBEXT, HCTEXT, PLTEXT in the last 72 hours.   Recent Labs     21  0537   *   K 3.8      CO2 22   GLU 55*   BUN 23*   CREA 0.89   CA 8.4*   ALB 2.6*   TBILI 2.6*   *     No results for input(s): AML, LPSE in the last 72 hours. No intake or output data in the 24 hours ending 12/21/21 1101    _____________________  Physical Exam:     General:  Alert, cooperative, no distress, appears stated age. Eyes:   Sclera clear. Throat: Lips, mucosa, and tongue normal.   Neck: Supple, symmetrical, trachea midline. Lungs:   Clear to auscultation bilaterally. Heart:  Regular rate and rhythm, AICD L anterior chest   Abdomen:   Normal BS, flat, Soft, mild RUQ tenderness. No masses,  No organomegaly. Extremities: Extremities normal, atraumatic, no cyanosis or edema. Skin: Skin color, texture, turgor normal. No rashes or lesions. Assessment:   Active Problems:    Cholecystitis (12/21/2021)            Plan:     Pt can't have MRI d/t AICD  Could have lap trevor w/ IOC but after cardiac clearance - his cardiologist is at [address] troponin today - d/w hospitalist  Also check cbc and lipase, also coag screen  Keep npo for now  GI seeing  Hold coumadin    Thank you for allowing us to participate in the care of this patient. Total time spent with patient: 30 minutes. Signed By: Sanket Zayas NP     December 21, 2021            Patient seen and examined  Patient complaining of 3-week history of chest and upper abdominal pain. Recently underwent endovascular stenting of aortic aneurysm. At outside hospital noted to have elevated bilirubin and dilated common bile duct. However his lipase was only mildly elevated at that time. He states that since being transferred over here he has been feeling somewhat better. General alert in no acute distress  Eyes there is some scleral icterus  Lungs clear bilaterally  Heart regular rate and rhythm  Abdomen soft minimal right upper quadrant tenderness no rebound no guarding  Extremities no edema  CT scan dilated common bile duct at 11 mm. Lipase today is greater than 3000. Assessment  Suspect patient had CBD stones that have now given him gallstone pancreatitis.   I think it is less likely that he has cholecystitis. Fortunately his unable to get an MRCP due to his pacemaker. Ideally patient would have a ERCP to clear duct prior to surgery. However if his bilirubin continues to go down once his pancreatitis has resolved it may be reasonable to do a lap trevor with IOC first however if bilirubin remains elevated would probably want ERCP prior to lap trevor. Cardiology has seen patient and whilst they are in the middle of their work-up overall think he will probably be a candidate for surgery.

## 2021-12-21 NOTE — PROGRESS NOTES
Pharmacist Admission Medication Reconciliation:    Updated PTA medication list with Mr Paz's wife, who carries his most up to date medication list.   Confirmed no longer taking bisoprolol, amlodipine, mycophenolate, acetylcysteine  Added HCTZ, metoprolol, lantanoprost, dry eye drops, lisinopril  Updated doses for gabapentin, omeprazole, vitamin D, prednisone, warfarin    Thank you for allowing me to participate in this patient's care. Please call [phone] or [phone] with any questions. Gloria Hwang, Pharmacist          [facility] pharmacy benefit data reflects medications filled and processed through the patient's insurance,   however this data does NOT capture whether the medication was picked up or is currently being taken by the patient. Prior to Admission Medications:   Prior to Admission Medications   Prescriptions Last Dose Informant Taking?   acetaminophen (TYLENOL) 500 mg tablet   Yes   Sig: Take 500 mg by mouth every six (6) hours as needed for Pain. allopurinoL (ZYLOPRIM) 300 mg tablet   Yes   Sig: Take 300 mg by mouth daily. aspirin delayed-release 81 mg tablet   Yes   Sig: Take 81 mg by mouth daily. atorvastatin (LIPITOR) 40 mg tablet   Yes   Sig: Take 40 mg by mouth every evening. calcium-vitamin D 600 mg-5 mcg (200 unit) tab   Yes   Sig: Take 1 Tablet by mouth every evening. cholecalciferol (VITAMIN D3) (1000 Units /25 mcg) tablet   Yes   Sig: Take 1,000 Units by mouth daily. ferrous fumarate/vit Bcomp,C (SUPER B COMPLEX PO)   Yes   Sig: Take 1 Tablet by mouth daily. gabapentin (NEURONTIN) 300 mg capsule   Yes   Sig: Take 300 mg by mouth two (2) times a day.   hydrOXYchloroQUINE (PLAQUENIL) 200 mg tablet   Yes   Sig: Take 200 mg by mouth two (2) times a day. hydroCHLOROthiazide (HYDRODIURIL) 12.5 mg tablet   Yes   Sig: Take 12.5 mg by mouth daily. latanoprost (XALATAN) 0.005 % ophthalmic solution   Yes   Sig: Administer 1 Drop to both eyes nightly.    lisinopriL (PRINIVIL, ZESTRIL) 10 mg tablet   Yes   Sig: Take 10 mg by mouth every evening. metoprolol succinate (TOPROL-XL) 25 mg XL tablet   Yes   Sig: Take 25 mg by mouth daily. omeprazole (PRILOSEC) 40 mg capsule   Yes   Sig: Take 40 mg by mouth Daily (before lunch). polyvinyl alcohol (LIQUIFILM TEARS) 1.4 % ophthalmic solution   Yes   Sig: Administer 1 Drop to left eye two (2) times a day. (generic dry eye drops)   potassium chloride SR (KLOR-CON 10) 10 mEq tablet   Yes   Sig: Take 10 mEq by mouth every morning. predniSONE (DELTASONE) 1 mg tablet   Yes   Sig: Take 2 mg by mouth daily. warfarin (COUMADIN) 2 mg tablet   Yes   Sig: Receives 2 mg tablets. Usual dose has been 4 mg every evening, but notes that recently increased to alternating 4 and 6 mg every other day.       Facility-Administered Medications: None    Allergies:  Alendronate sodium, Bacitracin, Naproxen, and Singulair [montelukast]

## 2021-12-21 NOTE — PROGRESS NOTES
MRI safety note:    Mr. Jonny Reyes has a Medtronic device, model # [id number] with lead model [id number]. This system is NOT MR-compatible due to the presence of this lead. Unable to proceed with MRI. Pt's RN notified by MRI RT.

## 2021-12-21 NOTE — PROGRESS NOTES
This patient was admitted earlier this morning from [facility], patient presented with upper abdominal and right upper quadrant symptoms was noticed to have acute cholecystitis and CBD dilatation  His pacemaker is not MRI compatible  Spoke to surgery, plan for likely cholecystectomy with cholangiogram followed by ERCP  GI consult pending  Surgical team requested cardiac clearance prior to proceeding  We will obtain cardiac clearance  His troponin was elevated at 0.33 at [facility], will repeat troponin, patient denies any chest pain  Appreciated pharmacy consult for home med reconciliation  Home medications restarted except for warfarin and atorvastatin  Patient reports he recently had abdominal and lower extremity stents placed 3 weeks ago

## 2021-12-21 NOTE — PROGRESS NOTES
Bedside shift change report given to [address]  (oncoming nurse) by Yanet Milian  (offgoing nurse). Report included the following information SBAR, Kardex, MAR and Recent Results.

## 2021-12-21 NOTE — PROGRESS NOTES
Problem: Falls - Risk of  Goal: *Absence of Falls  Description: Document [place] Flow Fall Risk and appropriate interventions in the flowsheet.   Outcome: Progressing Towards Goal  Note: Fall Risk Interventions:

## 2021-12-21 NOTE — CONSULTS
[address] [facility] Cardiology Consultation    Date of Consult:  12/21/21  Date of Admission: 12/20/2021  Primary Cardiologist: Dr. Pasquale Jacques  Physician Requesting consult: Dr. Minnie Soria:  - NSTEMI - troponin I 0.17 --> 0.33 at [facility] and minimally elevated hs [address] here. No anginal symptoms. This is likely due to demand ischemia from his intra-abdominal infection  - CAD s/p remote PCI in 1998. Stress test in 2019 showed inferior infarct but no ischemia. EF was 57%  - AAA, peripheral vascular disease s/p aortoiliac stenting few weeks ago by vascular surgeon Dr. Leodan Henriquez   - H/o VT s/p Medtronic ICD - not MRI compatible   - Acute cholecystitis - planning lap cholecystectomy and IOC  - HTN  - HLD  - SLE, hypercoagulable disorder  - Facial SCC    Recommendations / Plan:  - echo  - given his coronary disease history he is at elevated risk but do not recommend other cardiac testing. He is well optimized from cardiac standpoint - no angina and no signs of HF.   - continue aspirin and metoprolol marco antonio-operatively as tolerated    Addendum:  - Echo showed mildly reduced LVEF 45-50% and mild aortic stenosis. Wall motion abnormalities consistent with prior CAD history. He is euvolemic without signs of decompensated HF. He is well optimized from a cardiac standpoint for surgery. Restart lisinopril after surgery as tolerated. Follow up with Dr. Pasquale Jacques upon discharge. Thank you for the opportunity to participate in the care of Oscar Cano. . We will sign off. Please do not hesitate to contact us should you have any questions. Chief Complaint / Reason for Consult:   Preoperative evaluation for lap trevor, elevated troponin    History of Present Illness:  Oscar Jiménez is a 80 y.o. male with the below listed medical history who was admitted with abdominal pain and found to have cholecystitis. He was transferred here for further management. He has had intermittent right upper quadrant pain for the past three weeks.  Had decreased appetite over the past weekend and wife noted rigors and altered mentation so he was taken to ER. Workup was concerning for cholecystitis and CBD dilation. Troponin 0.17 --> 0.33 there. He denies chest pain or dyspnea. He is active and could go up a couple of flights of stairs without chest pain or dyspnea. No orthopnea, PND or LE edema. He had enodvascular repair of abdominal AAA by Dr. Denilson Hernandez at [facility] few weeks ago. Past Medical History:   Diagnosis Date    Arthritis     CAD (coronary artery disease) 1998    5 stents    Cancer ([facility] 2020    squamous cell, basal cell, melanoma    Coagulation disorder (HCC)     blood clotting d/o from taking naproxen    Hypertension     Lupus ([facility]     [facility] ([facility]        Prior to Admission medications    Medication Sig Start Date End Date Taking? Authorizing Provider   allopurinoL (ZYLOPRIM) 300 mg tablet Take 300 mg by mouth daily. Yes Provider, Historical   aspirin delayed-release 81 mg tablet Take 81 mg by mouth daily. Yes Provider, Historical   calcium-vitamin D 600 mg-5 mcg (200 unit) tab Take 1 Tablet by mouth every evening. Yes Provider, Historical   cholecalciferol (VITAMIN D3) (1000 Units /25 mcg) tablet Take 1,000 Units by mouth daily. Yes Provider, Historical   gabapentin (NEURONTIN) 300 mg capsule Take 300 mg by mouth two (2) times a day. Yes Provider, Historical   hydroCHLOROthiazide (HYDRODIURIL) 12.5 mg tablet Take 12.5 mg by mouth daily. Yes Provider, Historical   latanoprost (XALATAN) 0.005 % ophthalmic solution Administer 1 Drop to both eyes nightly. Yes Provider, Historical   lisinopriL (PRINIVIL, ZESTRIL) 10 mg tablet Take 10 mg by mouth every evening. Yes Provider, Historical   omeprazole (PRILOSEC) 40 mg capsule Take 40 mg by mouth Daily (before lunch). Yes Provider, Historical   predniSONE (DELTASONE) 1 mg tablet Take 2 mg by mouth daily.    Yes Provider, Historical   ferrous fumarate/vit Bcomp,C (SUPER B COMPLEX PO) Take 1 Tablet by mouth daily. Yes Provider, Historical   acetaminophen (TYLENOL) 500 mg tablet Take 500 mg by mouth every six (6) hours as needed for Pain. Yes Provider, Historical   warfarin (COUMADIN) 2 mg tablet Receives 2 mg tablets. Usual dose has been 4 mg every evening, but notes that recently increased to alternating 4 and 6 mg every other day. Yes Provider, Historical   metoprolol succinate (TOPROL-XL) 25 mg XL tablet Take 25 mg by mouth daily. Yes Provider, Historical   polyvinyl alcohol (LIQUIFILM TEARS) 1.4 % ophthalmic solution Administer 1 Drop to left eye two (2) times a day. (generic dry eye drops)   Yes Provider, Historical   hydrOXYchloroQUINE (PLAQUENIL) 200 mg tablet Take 200 mg by mouth two (2) times a day. Yes Provider, Historical   potassium chloride SR (KLOR-CON 10) 10 mEq tablet Take 10 mEq by mouth every morning. Yes Provider, Historical   atorvastatin (LIPITOR) 40 mg tablet Take 40 mg by mouth every evening.    Yes Provider, Historical       Current Facility-Administered Medications   Medication Dose Route Frequency    sodium chloride (NS) flush 5-40 mL  5-40 mL IntraVENous Q8H    sodium chloride (NS) flush 5-40 mL  5-40 mL IntraVENous PRN    acetaminophen (TYLENOL) tablet 650 mg  650 mg Oral Q6H PRN    Or    acetaminophen (TYLENOL) suppository 650 mg  650 mg Rectal Q6H PRN    dextrose 5% - 0.45% NaCl with KCl 20 mEq/L infusion  75 mL/hr IntraVENous CONTINUOUS    piperacillin-tazobactam (ZOSYN) 3.375 g in 0.9% sodium chloride (MBP/ADV) 100 mL MBP  3.375 g IntraVENous Q8H    gabapentin (NEURONTIN) capsule 300 mg  300 mg Oral BID    hydrOXYchloroQUINE (PLAQUENIL) tablet 200 mg  200 mg Oral BID    [START ON 12/22/2021] predniSONE (DELTASONE) tablet 2 mg  2 mg Oral DAILY WITH BREAKFAST    [START ON 12/22/2021] aspirin delayed-release tablet 81 mg  81 mg Oral DAILY       Family History   Problem Relation Age of Onset    No Known Problems Mother     Stroke Father    [facility] Cancer Sister     No Known Problems Brother     Heart Disease Brother     Cancer Brother     No Known Problems Brother     Anesth Problems Neg Hx      No family h/o SCD or premature CAD    Social History     Socioeconomic History    Marital status:      Spouse name: Not on file    Number of children: Not on file    Years of education: Not on file    Highest education level: Not on file   Occupational History    Not on file   Tobacco Use    Smoking status: Former Smoker     Packs/day: 1.00     Years: 25.00     Pack years: 25.00     Quit date: 1994     Years since quittin.9    Smokeless tobacco: Never Used   Substance and Sexual Activity    Alcohol use: Never    Drug use: Never    Sexual activity: Not on file   Other Topics Concern    Not on file   Social History Narrative    Not on file     Social Determinants of Health     Financial Resource Strain:     Difficulty of Paying Living Expenses: Not on file   Food Insecurity:     Worried About [address] in the Last Year: Not on file    [address] in the Last Year: Not on file   Transportation Needs:     Lack of Transportation (Medical): Not on file    Lack of Transportation (Non-Medical):  Not on file   Physical Activity:     Days of Exercise per Week: Not on file    Minutes of Exercise per Session: Not on file   Stress:     Feeling of Stress : Not on file   Social Connections:     Frequency of Communication with Friends and Family: Not on file    Frequency of Social Gatherings with Friends and Family: Not on file    Attends Sabianist Services: Not on file    Active Member of Clubs or Organizations: Not on file    Attends Club or Organization Meetings: Not on file    Marital Status: Not on file   Intimate Partner Violence:     Fear of Current or Ex-Partner: Not on file    Emotionally Abused: Not on file    Physically Abused: Not on file    Sexually Abused: Not on file   Housing Stability:     Unable to Pay for Housing in the Last Year: Not on file    Number of Places Lived in the Last Year: Not on file    Unstable Housing in the Last Year: Not on file       ROS  ROS: All other systems reviewed and were negative other than mentioned above.      Visit Vitals  BP (!) 157/73 (BP 1 Location: Left upper arm, BP Patient Position: At rest)   Pulse 69   Temp 97.8 °F (36.6 °C)   Resp 16   Ht 5' 7\" (1.702 m)   Wt 67.1 kg (148 lb)   SpO2 97%   BMI 23.18 kg/m²       No intake or output data in the 24 hours ending 12/21/21 1358     General: resting comfortably in no distress  HEENT: sclera anicteric, moist mucous membranes  Neck: supple, no JVD or HJR  CV: regular rate and rhythm, normal S1 and S2, II/VI holosystolic murmur no rubs or gallops,  Lungs: no respiratory distress, lungs clear to auscultation without wheezing or rales  Abdomen: soft, non distended, mild tenderness  MSK: no lower extremity edema  Skin: warm to touch  Neuro: alert and oriented, answered questions appropriately  Psych: normal mood and affect     Lab Review:  BMP:   Lab Results   Component Value Date/Time     (L) 12/21/2021 05:37 AM    K 3.8 12/21/2021 05:37 AM     12/21/2021 05:37 AM    CO2 22 12/21/2021 05:37 AM    AGAP 6 12/21/2021 05:37 AM    GLU 55 (L) 12/21/2021 05:37 AM    BUN 23 (H) 12/21/2021 05:37 AM    CREA 0.89 12/21/2021 05:37 AM    GFRAA >60 12/21/2021 05:37 AM    GFRNA >60 12/21/2021 05:37 AM        CBC:  Lab Results   Component Value Date/Time    WBC 6.1 12/21/2021 11:41 AM    HGB 8.0 (L) 12/21/2021 11:41 AM    HCT 23.7 (L) 12/21/2021 11:41 AM    PLATELET 57 (L) 67/51/0208 11:41 AM    MCV 99.6 (H) 12/21/2021 11:41 AM       All Cardiac Markers in the last 24 hours:  No results found for: CPK, CK, CKMMB, CKMB, RCK3, CKMBT, CKMBPOC, CKNDX, CKND1, CHINO, TROPT, TROIQ, KACEY, TROPT, TNIPOC, BNP, BNPP, BNPNT    Lab Results   Component Value Date/Time    Cholesterol, total 181 08/23/2012 09:06 AM    HDL Cholesterol 58 08/23/2012 09:06 AM LDL, calculated 103 (H) 08/23/2012 09:06 AM    VLDL, calculated 20 08/23/2012 09:06 AM    Triglyceride 98 08/23/2012 09:06 AM        Data Review:  ECG tracing personally reviewed: NSR, IVCD, inferior infarct, NSST changes. Echocardiogram: peending      Other cardiac testing: none      Signed:  Simeon Kerns, [facility]  12/21/21

## 2021-12-21 NOTE — CONSULTS
[address]  [address] [facility] [address]  [place], [address]  [phone]                     GI CONSULTATION NOTE      NAME:  Lennox Yanez. :   1940   MRN:   [id number]     Consult Date: 2021     Chief Complaint: GI on Call     History of Present Illness:  Patient is a 80 y.o. who is seen in consultation at the request of Dr. Myles Mayer for the above mentioned problem    Mr. Charlotte Machado has a past medical history significant for lupus, CAD with stenting on Coumadin, AICD, HTN, s/p AAA with aorto biliac graft, IVC filter, thrombocytopenia, and oral cancer. He presents from [facility] for ongoing epigastric and RUQ pain for the past 3 weeks. He reports that the pain comes and goes and is worst after eating. He takes TUMs which typically alleviates the pain. On  the pain worsened accompanied with nausea and vomiting x2. He went to [facility] and tried to ignore the pain that night but had chills and rigors. His wife decided to call 911, reportedly his temp was 103 when EMS arrived. CTAP at [facility] ED revealed distended gallbladder, mild intrahepatic biliary dilation, no calcified stones. Followed by US ABD showed CBD dilated 11mm, gallbladder distention with some fluid. GB wall thickness 5mm suspicious for cholecystitis. Labs in ED significant for  WBC 4.2, Hgb 9, Plt 55, INR 1.4, TB 5.1, direct bili 3.2, ALT, 435, , , Lipase 180, Trop 0.33.   He was started on Zosyn [facility] further evaluation         PMH:  Past Medical History:   Diagnosis Date    Arthritis     CAD (coronary artery disease)     5 stents    Cancer ([address]     squamous cell, basal cell, melanoma    Coagulation disorder ([address]     blood clotting d/o from taking naproxen    Hypertension     Lup[facility]     Thromboembolus (HCC)        PSH:  Past Surgical History:   Procedure Laterality Date   [facility]    stents placed    HX CATARACT REMOVAL Bilateral 2010    HX OTHER SURGICAL      multiple spots of skin cancer removal    HX PACEMAKER  1998    VASCULAR SURGERY PROCEDURE UNLIST  2017    IVC filter       Allergies: Allergies   Allergen Reactions    Alendronate Sodium Other (comments)     SEVERE PAIN    Bacitracin Other (comments)     Wife reports infection when received bacitracin topically.  Naproxen Other (comments)     CAUSED BLOOD CLOTS    Singulair [Montelukast] Other (comments)     SEVERE HEADACHES       Home Medications:  Prior to Admission Medications   Prescriptions Last Dose Informant Patient Reported? Taking?   atorvastatin (LIPITOR) 40 mg tablet   Yes Yes   Sig: Take 40 mg by mouth every evening.   hydrOXYchloroQUINE (PLAQUENIL) 200 mg tablet   Yes Yes   Sig: Take 200 mg by mouth two (2) times a day. potassium chloride SR (KLOR-CON 10) 10 mEq tablet   Yes No   Sig: Take 10 mEq by mouth every morning. Facility-Administered Medications: None       Hospital Medications:  Current Facility-Administered Medications   Medication Dose Route Frequency    sodium chloride (NS) flush 5-40 mL  5-40 mL IntraVENous Q8H    sodium chloride (NS) flush 5-40 mL  5-40 mL IntraVENous PRN    acetaminophen (TYLENOL) tablet 650 mg  650 mg Oral Q6H PRN    Or    acetaminophen (TYLENOL) suppository 650 mg  650 mg Rectal Q6H PRN    dextrose 5% - 0.45% NaCl with KCl 20 mEq/L infusion  75 mL/hr IntraVENous CONTINUOUS    piperacillin-tazobactam (ZOSYN) 3.375 g in 0.9% sodium chloride (MBP/ADV) 100 mL MBP  3.375 g IntraVENous Q8H    amLODIPine (NORVASC) tablet 5 mg  5 mg Oral QPM    . PHARMACY TO SUBSTITUTE PER PROTOCOL (Reordered from: bisoprolol-hydroCHLOROthiazide [facility]) 2.5-6.25 mg per tablet)    Per Protocol    gabapentin (NEURONTIN) capsule 100 mg  100 mg Oral BID    hydrOXYchloroQUINE (PLAQUENIL) tablet 200 mg  200 mg Oral BID    . PHARMACY TO SUBSTITUTE PER PROTOCOL (Reordered from: mycophenolate (CELLCEPT) 500 mg tablet)    Per Protocol    [START ON 2021] predniSONE (DELTASONE) tablet 1 mg  1 mg Oral DAILY WITH BREAKFAST    [START ON 2021] aspirin delayed-release tablet 81 mg  81 mg Oral DAILY       Social History:  Social History     Tobacco Use    Smoking status: Former Smoker     Packs/day: 1.00     Years: 25.00     Pack years: 25.00     Quit date: 1994     Years since quittin.9    Smokeless tobacco: Never Used   Substance Use Topics    Alcohol use: Never       Family History:  Family History   Problem Relation Age of Onset    No Known Problems Mother     Stroke Father     Cancer Sister     No Known Problems Brother     Heart Disease Brother     Cancer Brother     No Known Problems Brother     Anesth Problems Neg Hx        Review of Systems:    Constitutional: negative fever, negative chills, negative weight loss  Eyes:   negative visual changes  ENT:   negative sore throat, tongue or lip swelling  Respiratory:  negative cough, negative dyspnea  Cards:  negative for chest pain, palpitations, lower extremity edema  GI:   See HPI  :  negative for frequency, dysuria  Integument:  negative for rash and pruritus  Heme:  negative for easy bruising and gum/nose bleeding  Musculoskel: negative for myalgias, back pain and muscle weakness  Neuro: negative for headaches, dizziness, vertigo  Psych:  negative for feelings of anxiety, depression      Objective:     Patient Vitals for the past 8 hrs:   BP Temp Pulse Resp SpO2   21 1200   69     21 1000   66     21 0902 (!) 157/73 97.8 °F (36.6 °C) 66 16 97 %   21 0600   64       No intake/output data recorded. No intake/output data recorded. PHYSICAL EXAM:  General appearance: cooperative, no distress, appears stated age  Skin: Extremities and face reveal no rashes. No cabrera erythema. HEENT: Sclerae anicteric. Left facial droop  Cardiovascular: Regular rate and rhythm. +murmur, No gallops or rubs.    Respiratory: Comfortable breathing with no accessory muscle use. Clear breath sounds with no wheezes, rales, or rhonchi. GI: Abdomen nondistended, soft, and mild epigastric/RUQ tenderness. Normal active bowel sounds. No enlargement of the liver or spleen. No masses palpable. Rectal: Deferred   Musculoskeletal: No pitting edema of the lower legs. Neurological: Gross memory appears intact. Patient is alert and oriented. Psychiatric: Mood appears appropriate with good judgement. No anxiety or agitation. Data Review     No results for input(s): WBC, HGB, HCT, PLT, HGBEXT, HCTEXT, PLTEXT in the last 72 hours. Recent Labs     12/21/21  0537   *   K 3.8      CO2 22   BUN 23*   CREA 0.89   GLU 55*   CA 8.4*     Recent Labs     12/21/21  0537   *   TP 5.4*   ALB 2.6*   GLOB 2.8     No results for input(s): INR, PTP, APTT, INREXT in the last 72 hours. Imaging studies reviewed    Assessment / Plan :     Abdominal pain   Cholecystitis   Thrombocytopenia    73yo male with h/o oral CA, HTN, CAD w/ stents placed 3 weeks ago (on Coumadin), AICD, and lupus. Presented to OSH with post prandial RUQ and epigastric pain that would come and go over the past 3 weeks. States the pain typically is relieved with TUMs but \"did not sit right\"- That night he had chills, rigors, nausea & 2 episodes of non bloody emesis reported fever of 103. CT ABD/Pelvis 12/20/21 (OSH): revealed distended gallbladder, mild intrahepatic biliary dilation, no calcified stones. US ABD LTM (OSH): CBD dilated 11mm, gallbladder distention with some fluid. GB wall thickness 5mm suspicious for cholecystitis.     Labs (12/21/21) WBC 6.1, TB 2.6, , , , Lipase >3K     Unable to obtain MRCP for further evaluation of CBD/stones d/t AICD and stents  - trend LFT's and lipase  - Check INR, correct if necessary - will need to be less than 1.5  - Agree with ABX, continue IV Zosyn  - continue IVF- would like to Increase rate if possible  - keep NPO - ideally will need cholecystomy with IOC followed by ERCP  - further plans by Dr. Darnell Jones Problem List:   Active Problems:    Cholecystitis (12/21/2021)

## 2021-12-21 NOTE — PROGRESS NOTES
Medicare pt has received, reviewed, and signed 1st IM letter informing them of their right to appeal the discharge. Signed copy has been placed on pt bedside chart.     Marshall Patiño MSW, CRM, LMHP-e

## 2021-12-22 ENCOUNTER — ANESTHESIA (OUTPATIENT)
Dept: ENDOSCOPY | Age: 81
DRG: 417 | End: 2021-12-22
Payer: MEDICARE

## 2021-12-22 ENCOUNTER — APPOINTMENT (OUTPATIENT)
Dept: GENERAL RADIOLOGY | Age: 81
DRG: 417 | End: 2021-12-22
Attending: INTERNAL MEDICINE
Payer: MEDICARE

## 2021-12-22 ENCOUNTER — ANESTHESIA EVENT (OUTPATIENT)
Dept: ENDOSCOPY | Age: 81
DRG: 417 | End: 2021-12-22
Payer: MEDICARE

## 2021-12-22 LAB
ALBUMIN SERPL-MCNC: 2.4 G/DL (ref 3.5–5)
ALBUMIN/GLOB SERPL: 0.8 {RATIO} (ref 1.1–2.2)
ALP SERPL-CCNC: 561 U/L (ref 45–117)
ALT SERPL-CCNC: 304 U/L (ref 12–78)
ANION GAP SERPL CALC-SCNC: 6 MMOL/L (ref 5–15)
AST SERPL-CCNC: 189 U/L (ref 15–37)
BASOPHILS # BLD: 0 K/UL (ref 0–0.1)
BASOPHILS NFR BLD: 0 % (ref 0–1)
BILIRUB SERPL-MCNC: 3.3 MG/DL (ref 0.2–1)
BUN SERPL-MCNC: 15 MG/DL (ref 6–20)
BUN/CREAT SERPL: 19 (ref 12–20)
CALCIUM SERPL-MCNC: 8.2 MG/DL (ref 8.5–10.1)
CHLORIDE SERPL-SCNC: 107 MMOL/L (ref 97–108)
CO2 SERPL-SCNC: 22 MMOL/L (ref 21–32)
CREAT SERPL-MCNC: 0.81 MG/DL (ref 0.7–1.3)
DIFFERENTIAL METHOD BLD: ABNORMAL
EOSINOPHIL # BLD: 0 K/UL (ref 0–0.4)
EOSINOPHIL NFR BLD: 0 % (ref 0–7)
ERYTHROCYTE [DISTWIDTH] IN BLOOD BY AUTOMATED COUNT: 18.1 % (ref 11.5–14.5)
GLOBULIN SER CALC-MCNC: 2.9 G/DL (ref 2–4)
GLUCOSE SERPL-MCNC: 92 MG/DL (ref 65–100)
HCT VFR BLD AUTO: 22.5 % (ref 36.6–50.3)
HGB BLD-MCNC: 7.7 G/DL (ref 12.1–17)
IMM GRANULOCYTES # BLD AUTO: 0 K/UL (ref 0–0.04)
IMM GRANULOCYTES NFR BLD AUTO: 0 % (ref 0–0.5)
INR PPP: 1.3 (ref 0.9–1.1)
LIPASE SERPL-CCNC: >3000 U/L (ref 73–393)
LYMPHOCYTES # BLD: 0.2 K/UL (ref 0.8–3.5)
LYMPHOCYTES NFR BLD: 5 % (ref 12–49)
MCH RBC QN AUTO: 33.3 PG (ref 26–34)
MCHC RBC AUTO-ENTMCNC: 34.2 G/DL (ref 30–36.5)
MCV RBC AUTO: 97.4 FL (ref 80–99)
MONOCYTES # BLD: 0.6 K/UL (ref 0–1)
MONOCYTES NFR BLD: 13 % (ref 5–13)
NEUTS SEG # BLD: 4 K/UL (ref 1.8–8)
NEUTS SEG NFR BLD: 82 % (ref 32–75)
NRBC # BLD: 0 K/UL (ref 0–0.01)
NRBC BLD-RTO: 0 PER 100 WBC
PLATELET # BLD AUTO: 55 K/UL (ref 150–400)
PMV BLD AUTO: 11.3 FL (ref 8.9–12.9)
POTASSIUM SERPL-SCNC: 3.7 MMOL/L (ref 3.5–5.1)
PROT SERPL-MCNC: 5.3 G/DL (ref 6.4–8.2)
PROTHROMBIN TIME: 13.5 SEC (ref 9–11.1)
RBC # BLD AUTO: 2.31 M/UL (ref 4.1–5.7)
RBC MORPH BLD: ABNORMAL
SODIUM SERPL-SCNC: 135 MMOL/L (ref 136–145)
TROPONIN-HIGH SENSITIVITY: 340 NG/L (ref 0–76)
WBC # BLD AUTO: 4.8 K/UL (ref 4.1–11.1)

## 2021-12-22 PROCEDURE — 74011250636 HC RX REV CODE- 250/636: Performed by: NURSE ANESTHETIST, CERTIFIED REGISTERED

## 2021-12-22 PROCEDURE — 0FJB8ZZ INSPECTION OF HEPATOBILIARY DUCT, VIA NATURAL OR ARTIFICIAL OPENING ENDOSCOPIC: ICD-10-PCS | Performed by: INTERNAL MEDICINE

## 2021-12-22 PROCEDURE — 2709999900 HC NON-CHARGEABLE SUPPLY: Performed by: INTERNAL MEDICINE

## 2021-12-22 PROCEDURE — 74011250637 HC RX REV CODE- 250/637: Performed by: FAMILY MEDICINE

## 2021-12-22 PROCEDURE — 85610 PROTHROMBIN TIME: CPT

## 2021-12-22 PROCEDURE — 85025 COMPLETE CBC W/AUTO DIFF WBC: CPT

## 2021-12-22 PROCEDURE — 74011000636 HC RX REV CODE- 636: Performed by: INTERNAL MEDICINE

## 2021-12-22 PROCEDURE — 77030026438 HC STYL ET INTUB CARD -A: Performed by: ANESTHESIOLOGY

## 2021-12-22 PROCEDURE — 74011250637 HC RX REV CODE- 250/637: Performed by: STUDENT IN AN ORGANIZED HEALTH CARE EDUCATION/TRAINING PROGRAM

## 2021-12-22 PROCEDURE — 76040000007: Performed by: INTERNAL MEDICINE

## 2021-12-22 PROCEDURE — 77030021678 HC GLIDESCP STAT DISP VERT -B: Performed by: ANESTHESIOLOGY

## 2021-12-22 PROCEDURE — 0FC98ZZ EXTIRPATION OF MATTER FROM COMMON BILE DUCT, VIA NATURAL OR ARTIFICIAL OPENING ENDOSCOPIC: ICD-10-PCS | Performed by: INTERNAL MEDICINE

## 2021-12-22 PROCEDURE — 83690 ASSAY OF LIPASE: CPT

## 2021-12-22 PROCEDURE — 36415 COLL VENOUS BLD VENIPUNCTURE: CPT

## 2021-12-22 PROCEDURE — 74011000258 HC RX REV CODE- 258: Performed by: FAMILY MEDICINE

## 2021-12-22 PROCEDURE — 77030008684 HC TU ET CUF COVD -B: Performed by: ANESTHESIOLOGY

## 2021-12-22 PROCEDURE — 76060000032 HC ANESTHESIA 0.5 TO 1 HR: Performed by: INTERNAL MEDICINE

## 2021-12-22 PROCEDURE — 80053 COMPREHEN METABOLIC PANEL: CPT

## 2021-12-22 PROCEDURE — 77030007288 HC DEV LOK BILI BSC -A: Performed by: INTERNAL MEDICINE

## 2021-12-22 PROCEDURE — 77030012596 HC SPHNTOM BILI BSC -E: Performed by: INTERNAL MEDICINE

## 2021-12-22 PROCEDURE — 84484 ASSAY OF TROPONIN QUANT: CPT

## 2021-12-22 PROCEDURE — 77030009038 HC CATH BILI STN RTVR BSC -C: Performed by: INTERNAL MEDICINE

## 2021-12-22 PROCEDURE — C1726 CATH, BAL DIL, NON-VASCULAR: HCPCS | Performed by: INTERNAL MEDICINE

## 2021-12-22 PROCEDURE — 74011250636 HC RX REV CODE- 250/636: Performed by: FAMILY MEDICINE

## 2021-12-22 PROCEDURE — 74011250636 HC RX REV CODE- 250/636: Performed by: NURSE PRACTITIONER

## 2021-12-22 PROCEDURE — 74011000250 HC RX REV CODE- 250: Performed by: NURSE ANESTHETIST, CERTIFIED REGISTERED

## 2021-12-22 PROCEDURE — 99232 SBSQ HOSP IP/OBS MODERATE 35: CPT | Performed by: SURGERY

## 2021-12-22 PROCEDURE — 74011250637 HC RX REV CODE- 250/637: Performed by: INTERNAL MEDICINE

## 2021-12-22 PROCEDURE — 77030040361 HC SLV COMPR DVT MDII -B: Performed by: INTERNAL MEDICINE

## 2021-12-22 PROCEDURE — 77030010603 HC BLN DEV INFL BSC -B: Performed by: INTERNAL MEDICINE

## 2021-12-22 PROCEDURE — 65270000032 HC RM SEMIPRIVATE

## 2021-12-22 PROCEDURE — 74011636637 HC RX REV CODE- 636/637: Performed by: INTERNAL MEDICINE

## 2021-12-22 RX ORDER — SODIUM CHLORIDE 0.9 % (FLUSH) 0.9 %
5-40 SYRINGE (ML) INJECTION EVERY 8 HOURS
Status: DISCONTINUED | OUTPATIENT
Start: 2021-12-22 | End: 2021-12-28 | Stop reason: HOSPADM

## 2021-12-22 RX ORDER — HYDRALAZINE HYDROCHLORIDE 20 MG/ML
10 INJECTION INTRAMUSCULAR; INTRAVENOUS
Status: DISCONTINUED | OUTPATIENT
Start: 2021-12-22 | End: 2021-12-22

## 2021-12-22 RX ORDER — ATROPINE SULFATE 0.1 MG/ML
0.5 INJECTION INTRAVENOUS
Status: DISCONTINUED | OUTPATIENT
Start: 2021-12-22 | End: 2021-12-22 | Stop reason: HOSPADM

## 2021-12-22 RX ORDER — SUCCINYLCHOLINE CHLORIDE 20 MG/ML
INJECTION INTRAMUSCULAR; INTRAVENOUS AS NEEDED
Status: DISCONTINUED | OUTPATIENT
Start: 2021-12-22 | End: 2021-12-22 | Stop reason: HOSPADM

## 2021-12-22 RX ORDER — LIDOCAINE HYDROCHLORIDE 20 MG/ML
INJECTION, SOLUTION EPIDURAL; INFILTRATION; INTRACAUDAL; PERINEURAL AS NEEDED
Status: DISCONTINUED | OUTPATIENT
Start: 2021-12-22 | End: 2021-12-22 | Stop reason: HOSPADM

## 2021-12-22 RX ORDER — PHENYLEPHRINE HCL IN 0.9% NACL 0.4MG/10ML
SYRINGE (ML) INTRAVENOUS AS NEEDED
Status: DISCONTINUED | OUTPATIENT
Start: 2021-12-22 | End: 2021-12-22 | Stop reason: HOSPADM

## 2021-12-22 RX ORDER — PROPOFOL 10 MG/ML
INJECTION, EMULSION INTRAVENOUS AS NEEDED
Status: DISCONTINUED | OUTPATIENT
Start: 2021-12-22 | End: 2021-12-22 | Stop reason: HOSPADM

## 2021-12-22 RX ORDER — FLUMAZENIL 0.1 MG/ML
0.2 INJECTION INTRAVENOUS
Status: DISCONTINUED | OUTPATIENT
Start: 2021-12-22 | End: 2021-12-22 | Stop reason: HOSPADM

## 2021-12-22 RX ORDER — SODIUM CHLORIDE, SODIUM LACTATE, POTASSIUM CHLORIDE, CALCIUM CHLORIDE 600; 310; 30; 20 MG/100ML; MG/100ML; MG/100ML; MG/100ML
150 INJECTION, SOLUTION INTRAVENOUS CONTINUOUS
Status: DISCONTINUED | OUTPATIENT
Start: 2021-12-22 | End: 2021-12-23

## 2021-12-22 RX ORDER — NALOXONE HYDROCHLORIDE 0.4 MG/ML
0.4 INJECTION, SOLUTION INTRAMUSCULAR; INTRAVENOUS; SUBCUTANEOUS
Status: DISCONTINUED | OUTPATIENT
Start: 2021-12-22 | End: 2021-12-22 | Stop reason: HOSPADM

## 2021-12-22 RX ORDER — ATORVASTATIN CALCIUM 40 MG/1
40 TABLET, FILM COATED ORAL EVERY EVENING
Status: DISCONTINUED | OUTPATIENT
Start: 2021-12-22 | End: 2021-12-28 | Stop reason: HOSPADM

## 2021-12-22 RX ORDER — SODIUM CHLORIDE 9 MG/ML
INJECTION, SOLUTION INTRAVENOUS
Status: DISCONTINUED | OUTPATIENT
Start: 2021-12-22 | End: 2021-12-22 | Stop reason: HOSPADM

## 2021-12-22 RX ORDER — SODIUM CHLORIDE 0.9 % (FLUSH) 0.9 %
5-40 SYRINGE (ML) INJECTION AS NEEDED
Status: DISCONTINUED | OUTPATIENT
Start: 2021-12-22 | End: 2021-12-28 | Stop reason: HOSPADM

## 2021-12-22 RX ORDER — HYDRALAZINE HYDROCHLORIDE 20 MG/ML
10 INJECTION INTRAMUSCULAR; INTRAVENOUS
Status: DISCONTINUED | OUTPATIENT
Start: 2021-12-22 | End: 2021-12-28 | Stop reason: HOSPADM

## 2021-12-22 RX ORDER — HYDROCHLOROTHIAZIDE 25 MG/1
12.5 TABLET ORAL DAILY
Status: DISCONTINUED | OUTPATIENT
Start: 2021-12-23 | End: 2021-12-28 | Stop reason: HOSPADM

## 2021-12-22 RX ORDER — LISINOPRIL 10 MG/1
10 TABLET ORAL EVERY EVENING
Status: DISCONTINUED | OUTPATIENT
Start: 2021-12-22 | End: 2021-12-28 | Stop reason: HOSPADM

## 2021-12-22 RX ORDER — EPINEPHRINE 0.1 MG/ML
1 INJECTION INTRACARDIAC; INTRAVENOUS
Status: DISCONTINUED | OUTPATIENT
Start: 2021-12-22 | End: 2021-12-22 | Stop reason: HOSPADM

## 2021-12-22 RX ORDER — DEXTROMETHORPHAN/PSEUDOEPHED 2.5-7.5/.8
1.2 DROPS ORAL
Status: DISCONTINUED | OUTPATIENT
Start: 2021-12-22 | End: 2021-12-22 | Stop reason: HOSPADM

## 2021-12-22 RX ORDER — MORPHINE SULFATE 2 MG/ML
2 INJECTION, SOLUTION INTRAMUSCULAR; INTRAVENOUS
Status: DISCONTINUED | OUTPATIENT
Start: 2021-12-22 | End: 2021-12-26

## 2021-12-22 RX ADMIN — PROPOFOL 30 MG: 10 INJECTION, EMULSION INTRAVENOUS at 16:30

## 2021-12-22 RX ADMIN — ACETAMINOPHEN 650 MG: 325 TABLET ORAL at 09:47

## 2021-12-22 RX ADMIN — PREDNISONE 2 MG: 1 TABLET ORAL at 08:55

## 2021-12-22 RX ADMIN — LIDOCAINE HYDROCHLORIDE 60 MG: 20 INJECTION, SOLUTION EPIDURAL; INFILTRATION; INTRACAUDAL; PERINEURAL at 16:16

## 2021-12-22 RX ADMIN — Medication 140 MG: at 16:16

## 2021-12-22 RX ADMIN — Medication 80 MCG: at 16:16

## 2021-12-22 RX ADMIN — Medication 120 MCG: at 16:38

## 2021-12-22 RX ADMIN — PROPOFOL 140 MG: 10 INJECTION, EMULSION INTRAVENOUS at 16:16

## 2021-12-22 RX ADMIN — ATORVASTATIN CALCIUM 40 MG: 40 TABLET, FILM COATED ORAL at 17:36

## 2021-12-22 RX ADMIN — METOPROLOL SUCCINATE 25 MG: 25 TABLET, FILM COATED, EXTENDED RELEASE ORAL at 08:56

## 2021-12-22 RX ADMIN — Medication 60 MG: at 16:23

## 2021-12-22 RX ADMIN — PIPERACILLIN SODIUM AND TAZOBACTAM SODIUM 3.38 G: 3; .375 INJECTION, POWDER, LYOPHILIZED, FOR SOLUTION INTRAVENOUS at 08:55

## 2021-12-22 RX ADMIN — GABAPENTIN 300 MG: 300 CAPSULE ORAL at 17:36

## 2021-12-22 RX ADMIN — PIPERACILLIN SODIUM AND TAZOBACTAM SODIUM 3.38 G: 3; .375 INJECTION, POWDER, LYOPHILIZED, FOR SOLUTION INTRAVENOUS at 00:17

## 2021-12-22 RX ADMIN — SODIUM CHLORIDE, POTASSIUM CHLORIDE, SODIUM LACTATE AND CALCIUM CHLORIDE 150 ML/HR: 600; 310; 30; 20 INJECTION, SOLUTION INTRAVENOUS at 09:48

## 2021-12-22 RX ADMIN — Medication 80 MG: at 16:32

## 2021-12-22 RX ADMIN — Medication 120 MCG: at 16:45

## 2021-12-22 RX ADMIN — Medication 10 ML: at 06:46

## 2021-12-22 RX ADMIN — SODIUM CHLORIDE: 900 INJECTION, SOLUTION INTRAVENOUS at 16:13

## 2021-12-22 RX ADMIN — LISINOPRIL 10 MG: 10 TABLET ORAL at 17:36

## 2021-12-22 RX ADMIN — HYDROXYCHLOROQUINE SULFATE 200 MG: 200 TABLET ORAL at 17:36

## 2021-12-22 RX ADMIN — PIPERACILLIN SODIUM AND TAZOBACTAM SODIUM 3.37 G: 3; .375 INJECTION, POWDER, LYOPHILIZED, FOR SOLUTION INTRAVENOUS at 16:30

## 2021-12-22 NOTE — PROGRESS NOTES
118 [facility].  217 [facility] 140 [facility], [address]  [phone]                     GI PROGRESS NOTE    Patient Name: Shantell Gibson : 1940      MRN: [id number]  Admit Date: 2021  Today's Date: 2021    Subjective:     Reports increased dull epigastric and RUQ pain. Pain is worse depending on position in bed. Denies nausea or vomiting. Has not had anything to eat since Saturday, has been NPO for EUS today. Objective:     Blood pressure (!) 162/82, pulse 72, temperature 98.8 °F (37.1 °C), resp. rate 18, height 5' 7\" (1.702 m), weight 67.1 kg (148 lb), SpO2 94 %. Physical Exam:  General appearance: cooperative, no distress, appears stated age  Skin: Extremities and face reveal no rashes. No cabrera erythema. HEENT: Sclerae anicteric. Left facial droop  Cardiovascular: Regular rate and rhythm. +murmur, No gallops or rubs. Respiratory: Comfortable breathing with no accessory muscle use. Clear breath sounds with no wheezes, rales, or rhonchi. GI: Abdomen nondistended, soft, and increased epigastric/RUQ tenderness. Normal active bowel sounds. Rectal: Deferred   Musculoskeletal: No pitting edema of the lower legs. Neurological: Gross memory appears intact. Patient is alert and oriented. Psychiatric: Mood appears appropriate with good judgement.   No anxiety or agitation      Data Review:    Recent Results (from the past 24 hour(s))   CBC WITH AUTOMATED DIFF    Collection Time: 21 11:41 AM   Result Value Ref Range    WBC 6.1 4.1 - 11.1 K/uL    RBC 2.38 (L) 4.10 - 5.70 M/uL    HGB 8.0 (L) 12.1 - 17.0 g/dL    HCT 23.7 (L) 36.6 - 50.3 %    MCV 99.6 (H) 80.0 - 99.0 FL    MCH 33.6 26.0 - 34.0 PG    MCHC 33.8 30.0 - 36.5 g/dL    RDW 18.3 (H) 11.5 - 14.5 %    PLATELET 57 (L) 186 - 400 K/uL    MPV 11.7 8.9 - 12.9 FL    NRBC 0.0 0  WBC    ABSOLUTE NRBC 0.00 0.00 - 0.01 K/uL    NEUTROPHILS 84 (H) 32 - 75 %    LYMPHOCYTES 4 (L) 12 - 49 % MONOCYTES 11 5 - 13 %    EOSINOPHILS 0 0 - 7 %    BASOPHILS 0 0 - 1 %    IMMATURE GRANULOCYTES 1 (H) 0.0 - 0.5 %    ABS. NEUTROPHILS 5.1 1.8 - 8.0 K/UL    ABS. LYMPHOCYTES 0.2 (L) 0.8 - 3.5 K/UL    ABS. MONOCYTES 0.7 0.0 - 1.0 K/UL    ABS. EOSINOPHILS 0.0 0.0 - 0.4 K/UL    ABS. BASOPHILS 0.0 0.0 - 0.1 K/UL    ABS. IMM.  GRANS. 0.1 (H) 0.00 - 0.04 K/UL    DF SMEAR SCANNED      RBC COMMENTS ANISOCYTOSIS  1+        RBC COMMENTS MICHAEL CELLS  PRESENT        RBC COMMENTS OVALOCYTES  PRESENT       LIPASE    Collection Time: 12/21/21 11:41 AM   Result Value Ref Range    Lipase >3,000 (H) 73 - 393 U/L   COAGULATION SCREEN    Collection Time: 12/21/21 11:41 AM   Result Value Ref Range    Fibrinogen 378 200 - 475 mg/dL    INR 1.7 (H) 0.9 - 1.1      Prothrombin time 17.5 (H) 9.0 - 11.1 sec    aPTT 46.8 (H) 22.1 - 31.0 sec    aPTT, therapeutic range     58.0 - 77.0 SECS   TROPONIN-HIGH SENSITIVITY    Collection Time: 12/21/21 11:41 AM   Result Value Ref Range    Troponin-High Sensitivity 374 (HH) 0 - 76 ng/L   EKG, 12 LEAD, INITIAL    Collection Time: 12/21/21  1:58 PM   Result Value Ref Range    Ventricular Rate 74 BPM    Atrial Rate 74 BPM    P-R Interval 206 ms    QRS Duration 126 ms    Q-T Interval 424 ms    QTC Calculation (Bezet) 470 ms    Calculated P Axis 29 degrees    Calculated R Axis 11 degrees    Calculated T Axis -15 degrees    Diagnosis       Normal sinus rhythm  Nonspecific intraventricular block  Inferior infarct , age undetermined  Abnormal ECG  No previous ECGs available     ECHO ADULT COMPLETE    Collection Time: 12/21/21  3:05 PM   Result Value Ref Range    IVSd 1.3 (A) 0.6 - 1.0 cm    LVIDd 6.3 (A) 4.2 - 5.9 cm    LVIDs 5.7 cm    LVOT Diameter 2.2 cm    LVPWd 1.0 0.6 - 1.0 cm    EF BP 43 (A) 55 - 100 %    EF BP 43 (A) 55 - 100 %    LV Ejection Fraction A2C 36 %    LV Ejection Fraction A4C 46 %    LV EDV A2C 178 mL    LV EDV A4C 219 mL    LV EDV  (A) 67 - 155 mL    LV EDV  (A) 67 - 155 mL    LV ESV A2C 115 mL    LV ESV A4C 118 mL    LV ESV  (A) 22 - 58 mL    LVOT Peak Gradient 4 mmHg    LVOT Peak Velocity 1.0 m/s    RVIDd 4.8 cm    RV Free Wall Peak S' 12 cm/s    LA Diameter 4.2 cm    LA Volume A/L 64 mL    LA Volume 2C 53 18 - 58 mL    LA Volume 4C 68 (A) 18 - 58 mL    AV Area by Peak Velocity 1.6 cm2    AV Peak Gradient 20 mmHg    AV Peak Velocity 2.3 m/s    MV A Velocity 1.02 m/s    MV E Wave Deceleration Time 212.8 ms    MV E Velocity 0.68 m/s    LV E' Lateral Velocity 8 cm/s    LV E' Septal Velocity 5 cm/s    MV PHT 61.7 ms    MV Area by PHT 3.6 cm2    MR Peak Gradient 207 mmHg    MR Peak Velocity 7.2 m/s    PV Peak Gradient 6 mmHg    PV Max Velocity 1.3 m/s    TAPSE 2.1 (A) 1.5 - 2.0 cm    TR Peak Gradient 28 mmHg    TR Max Velocity 2.63 m/s    Aortic Root 3.9 cm    Fractional Shortening 2D 10 28 - 44 %    LV ESV Index BP 66 mL/m2    LV ESV Index A4C 66 mL/m2    LV EDV Index A4C 123 mL/m2    LV ESV Index A2C 65 mL/m2    LV EDV Index A2C 100 mL/m2    LVIDd Index 3.54 cm/m2    LVIDs Index 3.20 cm/m2    LV RWT Ratio 0.32     LV Mass 2D 321.8 (A) 88 - 224 g    LV Mass 2D Index 180.8 (A) 49 - 115 g/m2    MV E/A 0.67     E/E' Ratio (Averaged) 11.05     E/E' Lateral 8.50     E/E' Septal 13.60     LA Volume Index A/L 36 16 - 34 mL/m2    LVOT Area 3.8 cm2    LA Volume Index 2C 30 16 - 34 mL/m2    LA Volume Index 4C 38 (A) 16 - 34 mL/m2    LA Size Index 2.36 cm/m2    LA/AO Root Ratio 1.08     Ao Root Index 2.19 cm/m2    AV Velocity Ratio 0.43     DENTON/BSA Peak Velocity 0.9 cm2/m2   COVID-19 RAPID TEST    Collection Time: 12/21/21  6:41 PM   Result Value Ref Range    Specimen source Nasopharyngeal      COVID-19 rapid test Not detected NOTD     TROPONIN-HIGH SENSITIVITY    Collection Time: 12/21/21  7:57 PM   Result Value Ref Range    Troponin-High Sensitivity 417 (HH) 0 - 76 ng/L   CBC WITH AUTOMATED DIFF    Collection Time: 12/22/21  1:43 AM   Result Value Ref Range    WBC 4.8 4.1 - 11.1 K/uL    RBC 2.31 (L) 4.10 - 5.70 M/uL    HGB 7.7 (L) 12.1 - 17.0 g/dL    HCT 22.5 (L) 36.6 - 50.3 %    MCV 97.4 80.0 - 99.0 FL    MCH 33.3 26.0 - 34.0 PG    MCHC 34.2 30.0 - 36.5 g/dL    RDW 18.1 (H) 11.5 - 14.5 %    PLATELET 55 (L) 143 - 400 K/uL    MPV 11.3 8.9 - 12.9 FL    NRBC 0.0 0  WBC    ABSOLUTE NRBC 0.00 0.00 - 0.01 K/uL    NEUTROPHILS 82 (H) 32 - 75 %    LYMPHOCYTES 5 (L) 12 - 49 %    MONOCYTES 13 5 - 13 %    EOSINOPHILS 0 0 - 7 %    BASOPHILS 0 0 - 1 %    IMMATURE GRANULOCYTES 0 0.0 - 0.5 %    ABS. NEUTROPHILS 4.0 1.8 - 8.0 K/UL    ABS. LYMPHOCYTES 0.2 (L) 0.8 - 3.5 K/UL    ABS. MONOCYTES 0.6 0.0 - 1.0 K/UL    ABS. EOSINOPHILS 0.0 0.0 - 0.4 K/UL    ABS. BASOPHILS 0.0 0.0 - 0.1 K/UL    ABS. IMM. GRANS. 0.0 0.00 - 0.04 K/UL    DF SMEAR SCANNED      RBC COMMENTS ANISOCYTOSIS  1+        RBC COMMENTS MACROCYTOSIS  1+        RBC COMMENTS OVALOCYTES  PRESENT        RBC COMMENTS MICHAEL CELLS  PRESENT       METABOLIC PANEL, COMPREHENSIVE    Collection Time: 12/22/21  1:43 AM   Result Value Ref Range    Sodium 135 (L) 136 - 145 mmol/L    Potassium 3.7 3.5 - 5.1 mmol/L    Chloride 107 97 - 108 mmol/L    CO2 22 21 - 32 mmol/L    Anion gap 6 5 - 15 mmol/L    Glucose 92 65 - 100 mg/dL    BUN 15 6 - 20 MG/DL    Creatinine 0.81 0.70 - 1.30 MG/DL    BUN/Creatinine ratio 19 12 - 20      GFR est AA >60 >60 ml/min/1.73m2    GFR est non-AA >60 >60 ml/min/1.73m2    Calcium 8.2 (L) 8.5 - 10.1 MG/DL    Bilirubin, total 3.3 (H) 0.2 - 1.0 MG/DL    ALT (SGPT) 304 (H) 12 - 78 U/L    AST (SGOT) 189 (H) 15 - 37 U/L    Alk.  phosphatase 561 (H) 45 - 117 U/L    Protein, total 5.3 (L) 6.4 - 8.2 g/dL    Albumin 2.4 (L) 3.5 - 5.0 g/dL    Globulin 2.9 2.0 - 4.0 g/dL    A-G Ratio 0.8 (L) 1.1 - 2.2     LIPASE    Collection Time: 12/22/21  1:43 AM   Result Value Ref Range    Lipase >3,000 (H) 73 - 393 U/L   PROTHROMBIN TIME + INR    Collection Time: 12/22/21  1:43 AM   Result Value Ref Range    INR 1.3 (H) 0.9 - 1.1      Prothrombin time 13.5 (H) 9.0 - 11.1 sec         Assessment / Plan :     Abdominal pain   Cholecystitis   Thrombocytopenia  Gallstone pancreatitis    71yo male withPt has a history of chronic thrombocytopenia (baseline plt 50-60k), 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 Presented to OSH with post prandial RUQ and epigastric pain that would come and go over the past 3 weeks. States the pain typically is relieved with TUMs but \"did not sit right. \" That night he had chills, rigors, nausea & 2 episodes of non bloody emesis reported fever of 103. LFT[facility] at [facility] notable for T bili 5.1 --> 2.6. Suspect stone has passed, mild associated gallstone pancreatitis, though as MRCP is contraindicated reasonable to start w EUS tomorrow to ensure duct is clear and can then proceed w CCK. If EUS is positive will move forward w ERCP. Trend LFTs, INR (will need to be less than 1.5). CT ABD/Pelvis 12/20/21 (OSH): revealed distended gallbladder, mild intrahepatic biliary dilation, no calcified stones. US ABD LTM (OSH): CBD dilated 11mm, gallbladder distention with some fluid. GB wall thickness 5mm suspicious for cholecystitis.       Labs (12/22/21) WBC 4.8->6.1, Plt 55 ->57, INR 1.3->1.7,  TB 3.3->2.6, -> 354,  -> 223, ->468, Lipase >3K    - Plan for EUS with possible ERCP today  - NPO  - change IVF to LR and increase rate to 150ml/hr  - trend LFT's, lipase, INR  - Agree with ABX, continue IV Zosyn  - will ensure duct is clear to proceed with cholecystectomy  - surgery following  - supportive care, antiemetics, pain medication per hospitalist  - optimized per cardiology from a cardiac standpoint        Patient Active Hospital Problem List:   Active Problems:    Cholecystitis (12/21/2021)        Luna Sharif NP

## 2021-12-22 NOTE — PROCEDURES
118 [facility].  217 [facility] 140 [facility], [address]  [phone]                   ERCP NOTE    NAME:  Aimee Sweeney :   1940   MRN:   [id number]     Date/Time:  2021 4:49 PM    Procedure Type:   ERCPwith biliary sphincterotomy, biliary stone removal     Indications: acute pancreatitis, abnormal bilirubin, abnormal liver enzymes  Pre-operative Diagnosis: see indication above  Post-operative Diagnosis:  See findings below  : Fercho Retana MD    Staff: Endoscopy Technician-1: Richie Moncada IV  Endoscopy RN-1: Afia Herrera RN     Implants: none    Referring Provider:    Yves Foote MD    Sedation:  General anesthesia    Procedure Details:  After informed consent was obtained with all risks and benefits of procedure explained, the patient was taken to the fluoroscopy suite and placed in the prone position. Upon sequential sedation as per above, the Olympus duodenoscope [id number]   was inserted via the mouthpeice and carefully advanced to the second portion of the duodenum. The quality of visualization was good. The duodenoscope was withdrawn into a short position. Findings:   Esophagus:indirect visualization  Stomach: indirect visualization  Duodenum/jejunum:  indirect visualization  Ampulla:-normal   Cholangiogram: -Bile duct was selectively cannulated using dream wire. Cannulation was smooth. Contrast was injected. Few small irregular filling defects 2 to 3 mm in size were seen in the distal common bile duct. Common bile duct and common hepatic duct were mildly and diffusely dilated with the largest diameter about 12 mm. Mild stenosis was noted immediately proximal to the ampulla. This was less than 1 cm in length. Biliary sphincterotomy was performed using ERBE. Balloon sweeps were performed using extraction balloon 9 to 12 mm injection below. Few fragments of small stones and sludge were removed.  Dilation of the stricture was performed using Hurricaine balloon 4 mm x 4 cm. Bile and contrast were draining promptly. No stones remained. Pancreatogram:not performed    Specimen Removed: * No specimens in log *    Complications: None. EBL:  None. Interventions:    Pancreatic: none  Biliary: Bile duct was selectively cannulated using dream wire. Cannulation was smooth. Contrast was injected. Few small irregular filling defects 2 to 3 mm in size were seen in the distal common bile duct. Common bile duct and common hepatic duct were mildly and diffusely dilated with the largest diameter about 12 mm. Mild stenosis was noted immediately proximal to the ampulla. This was less than 1 cm in length. Biliary sphincterotomy was performed using ERBE. Balloon sweeps were performed using extraction balloon 9 to 12 mm injection below. Few fragments of small stones and sludge were removed. Dilation of the stricture was performed using Hurricaine balloon 4 mm x 4 cm. Bile and contrast were draining promptly. No stones remained. Impression:    -Bile duct stones-extracted  -Bile duct stricture dilated    Recommendations:    May have ice chips and sips of water as tolerated. Resume normal medication(s).   NO aspirin for 7 days    Monitor clinical course and management per primary team      Discharge Disposition: Continue care in the hospital    Tiffanie Frias MD  12/22/2021  4:49 PM

## 2021-12-22 NOTE — CARDIO/PULMONARY
Cardiac Rehab: Mesfin Meadard. is s/p NSTEMI d/t demand transferred to [facility] from \"[facility]\". He is here for treatment for cholecystitis. Patient is out of the area for cardiac rehabs, based on his home address.  Yecenia Gibson RN

## 2021-12-22 NOTE — PROGRESS NOTES
Progress Note    Patient: Wayne Page. MRN: [id number]  SSN: [id number]    YOB: 1940  Age: 80 y.o. Sex: male      Admit Date: 2021    * No surgery date entered *    Procedure:  Procedure(s):  ENDOSCOPIC ULTRASOUND (EUS)    Subjective:     Patient without complaints this morning. Mild epigastric pain    Objective:     Visit Vitals  BP (!) 162/82   Pulse 72   Temp 98.8 °F (37.1 °C)   Resp 18   Ht 5' 7\" (1.702 m)   Wt 148 lb (67.1 kg)   SpO2 94%   BMI 23.18 kg/m²       Temp (24hrs), Av.7 °F (37.1 °C), Min:97.8 °F (36.6 °C), Max:99 °F (37.2 °C)      Physical Exam:    General alert in no acute distress  Lungs clear bilaterally  Heart regular rate and rhythm  Abdomen soft minimal epigastric tenderness no rebound or guarding    Data Review: images and reports reviewed    Lab Review: All lab results for the last 24 hours reviewed. Recent Results (from the past 24 hour(s))   CBC WITH AUTOMATED DIFF    Collection Time: 21 11:41 AM   Result Value Ref Range    WBC 6.1 4.1 - 11.1 K/uL    RBC 2.38 (L) 4.10 - 5.70 M/uL    HGB 8.0 (L) 12.1 - 17.0 g/dL    HCT 23.7 (L) 36.6 - 50.3 %    MCV 99.6 (H) 80.0 - 99.0 FL    MCH 33.6 26.0 - 34.0 PG    MCHC 33.8 30.0 - 36.5 g/dL    RDW 18.3 (H) 11.5 - 14.5 %    PLATELET 57 (L) 018 - 400 K/uL    MPV 11.7 8.9 - 12.9 FL    NRBC 0.0 0  WBC    ABSOLUTE NRBC 0.00 0.00 - 0.01 K/uL    NEUTROPHILS 84 (H) 32 - 75 %    LYMPHOCYTES 4 (L) 12 - 49 %    MONOCYTES 11 5 - 13 %    EOSINOPHILS 0 0 - 7 %    BASOPHILS 0 0 - 1 %    IMMATURE GRANULOCYTES 1 (H) 0.0 - 0.5 %    ABS. NEUTROPHILS 5.1 1.8 - 8.0 K/UL    ABS. LYMPHOCYTES 0.2 (L) 0.8 - 3.5 K/UL    ABS. MONOCYTES 0.7 0.0 - 1.0 K/UL    ABS. EOSINOPHILS 0.0 0.0 - 0.4 K/UL    ABS. BASOPHILS 0.0 0.0 - 0.1 K/UL    ABS. IMM.  GRANS. 0.1 (H) 0.00 - 0.04 K/UL    DF SMEAR SCANNED      RBC COMMENTS ANISOCYTOSIS  1+        RBC COMMENTS MICHAEL CELLS  PRESENT        RBC COMMENTS OVALOCYTES  PRESENT       LIPASE    Collection Time: 12/21/21 11:41 AM   Result Value Ref Range    Lipase >3,000 (H) 73 - 393 U/L   COAGULATION SCREEN    Collection Time: 12/21/21 11:41 AM   Result Value Ref Range    Fibrinogen 378 200 - 475 mg/dL    INR 1.7 (H) 0.9 - 1.1      Prothrombin time 17.5 (H) 9.0 - 11.1 sec    aPTT 46.8 (H) 22.1 - 31.0 sec    aPTT, therapeutic range     58.0 - 77.0 SECS   TROPONIN-HIGH SENSITIVITY    Collection Time: 12/21/21 11:41 AM   Result Value Ref Range    Troponin-High Sensitivity 374 (HH) 0 - 76 ng/L   EKG, 12 LEAD, INITIAL    Collection Time: 12/21/21  1:58 PM   Result Value Ref Range    Ventricular Rate 74 BPM    Atrial Rate 74 BPM    P-R Interval 206 ms    QRS Duration 126 ms    Q-T Interval 424 ms    QTC Calculation (Bezet) 470 ms    Calculated P Axis 29 degrees    Calculated R Axis 11 degrees    Calculated T Axis -15 degrees    Diagnosis       Normal sinus rhythm  Nonspecific intraventricular block  Inferior infarct , age undetermined  Abnormal ECG  No previous ECGs available     ECHO ADULT COMPLETE    Collection Time: 12/21/21  3:05 PM   Result Value Ref Range    IVSd 1.3 (A) 0.6 - 1.0 cm    LVIDd 6.3 (A) 4.2 - 5.9 cm    LVIDs 5.7 cm    LVOT Diameter 2.2 cm    LVPWd 1.0 0.6 - 1.0 cm    EF BP 43 (A) 55 - 100 %    EF BP 43 (A) 55 - 100 %    LV Ejection Fraction A2C 36 %    LV Ejection Fraction A4C 46 %    LV EDV A2C 178 mL    LV EDV A4C 219 mL    LV EDV  (A) 67 - 155 mL    LV EDV  (A) 67 - 155 mL    LV ESV A2C 115 mL    LV ESV A4C 118 mL    LV ESV  (A) 22 - 58 mL    LVOT Peak Gradient 4 mmHg    LVOT Peak Velocity 1.0 m/s    RVIDd 4.8 cm    RV Free Wall Peak S' 12 cm/s    LA Diameter 4.2 cm    LA Volume A/L 64 mL    LA Volume 2C 53 18 - 58 mL    LA Volume 4C 68 (A) 18 - 58 mL    AV Area by Peak Velocity 1.6 cm2    AV Peak Gradient 20 mmHg    AV Peak Velocity 2.3 m/s    MV A Velocity 1.02 m/s    MV E Wave Deceleration Time 212.8 ms    MV E Velocity 0.68 m/s    LV E' Lateral Velocity 8 cm/s    LV E' Septal Velocity 5 cm/s    MV PHT 61.7 ms    MV Area by PHT 3.6 cm2    MR Peak Gradient 207 mmHg    MR Peak Velocity 7.2 m/s    PV Peak Gradient 6 mmHg    PV Max Velocity 1.3 m/s    TAPSE 2.1 (A) 1.5 - 2.0 cm    TR Peak Gradient 28 mmHg    TR Max Velocity 2.63 m/s    Aortic Root 3.9 cm    Fractional Shortening 2D 10 28 - 44 %    LV ESV Index BP 66 mL/m2    LV ESV Index A4C 66 mL/m2    LV EDV Index A4C 123 mL/m2    LV ESV Index A2C 65 mL/m2    LV EDV Index A2C 100 mL/m2    LVIDd Index 3.54 cm/m2    LVIDs Index 3.20 cm/m2    LV RWT Ratio 0.32     LV Mass 2D 321.8 (A) 88 - 224 g    LV Mass 2D Index 180.8 (A) 49 - 115 g/m2    MV E/A 0.67     E/E' Ratio (Averaged) 11.05     E/E' Lateral 8.50     E/E' Septal 13.60     LA Volume Index A/L 36 16 - 34 mL/m2    LVOT Area 3.8 cm2    LA Volume Index 2C 30 16 - 34 mL/m2    LA Volume Index 4C 38 (A) 16 - 34 mL/m2    LA Size Index 2.36 cm/m2    LA/AO Root Ratio 1.08     Ao Root Index 2.19 cm/m2    AV Velocity Ratio 0.43     DENTON/BSA Peak Velocity 0.9 cm2/m2   COVID-19 RAPID TEST    Collection Time: 12/21/21  6:41 PM   Result Value Ref Range    Specimen source Nasopharyngeal      COVID-19 rapid test Not detected NOTD     TROPONIN-HIGH SENSITIVITY    Collection Time: 12/21/21  7:57 PM   Result Value Ref Range    Troponin-High Sensitivity 417 (HH) 0 - 76 ng/L   CBC WITH AUTOMATED DIFF    Collection Time: 12/22/21  1:43 AM   Result Value Ref Range    WBC 4.8 4.1 - 11.1 K/uL    RBC 2.31 (L) 4.10 - 5.70 M/uL    HGB 7.7 (L) 12.1 - 17.0 g/dL    HCT 22.5 (L) 36.6 - 50.3 %    MCV 97.4 80.0 - 99.0 FL    MCH 33.3 26.0 - 34.0 PG    MCHC 34.2 30.0 - 36.5 g/dL    RDW 18.1 (H) 11.5 - 14.5 %    PLATELET 55 (L) 297 - 400 K/uL    MPV 11.3 8.9 - 12.9 FL    NRBC 0.0 0  WBC    ABSOLUTE NRBC 0.00 0.00 - 0.01 K/uL    NEUTROPHILS 82 (H) 32 - 75 %    LYMPHOCYTES 5 (L) 12 - 49 %    MONOCYTES 13 5 - 13 %    EOSINOPHILS 0 0 - 7 %    BASOPHILS 0 0 - 1 %    IMMATURE GRANULOCYTES 0 0.0 - 0.5 %    ABS.  NEUTROPHILS 4.0 1.8 - 8.0 K/UL    ABS. LYMPHOCYTES 0.2 (L) 0.8 - 3.5 K/UL    ABS. MONOCYTES 0.6 0.0 - 1.0 K/UL    ABS. EOSINOPHILS 0.0 0.0 - 0.4 K/UL    ABS. BASOPHILS 0.0 0.0 - 0.1 K/UL    ABS. IMM. GRANS. 0.0 0.00 - 0.04 K/UL    DF SMEAR SCANNED      RBC COMMENTS ANISOCYTOSIS  1+        RBC COMMENTS MACROCYTOSIS  1+        RBC COMMENTS OVALOCYTES  PRESENT        RBC COMMENTS MICHAEL CELLS  PRESENT       METABOLIC PANEL, COMPREHENSIVE    Collection Time: 12/22/21  1:43 AM   Result Value Ref Range    Sodium 135 (L) 136 - 145 mmol/L    Potassium 3.7 3.5 - 5.1 mmol/L    Chloride 107 97 - 108 mmol/L    CO2 22 21 - 32 mmol/L    Anion gap 6 5 - 15 mmol/L    Glucose 92 65 - 100 mg/dL    BUN 15 6 - 20 MG/DL    Creatinine 0.81 0.70 - 1.30 MG/DL    BUN/Creatinine ratio 19 12 - 20      GFR est AA >60 >60 ml/min/1.73m2    GFR est non-AA >60 >60 ml/min/1.73m2    Calcium 8.2 (L) 8.5 - 10.1 MG/DL    Bilirubin, total 3.3 (H) 0.2 - 1.0 MG/DL    ALT (SGPT) 304 (H) 12 - 78 U/L    AST (SGOT) 189 (H) 15 - 37 U/L    Alk. phosphatase 561 (H) 45 - 117 U/L    Protein, total 5.3 (L) 6.4 - 8.2 g/dL    Albumin 2.4 (L) 3.5 - 5.0 g/dL    Globulin 2.9 2.0 - 4.0 g/dL    A-G Ratio 0.8 (L) 1.1 - 2.2     LIPASE    Collection Time: 12/22/21  1:43 AM   Result Value Ref Range    Lipase >3,000 (H) 73 - 393 U/L   PROTHROMBIN TIME + INR    Collection Time: 12/22/21  1:43 AM   Result Value Ref Range    INR 1.3 (H) 0.9 - 1.1      Prothrombin time 13.5 (H) 9.0 - 11.1 sec       Assessment:     Hospital Problems  Date Reviewed: 9/3/2013          Codes Class Noted POA    Cholecystitis ICD-10-CM: K81.9  ICD-9-CM: 575.10  12/21/2021 Unknown              Plan/Recommendations/Medical Decision Making:   Gallstone pancreatitis with elevated bilirubin  Patient for EUS and possible ERCP today. We will proceed with laparoscopic cholecystectomy once pancreatitis has resolved and bilirubin has improved.

## 2021-12-22 NOTE — PROGRESS NOTES
Pt arrived to unit via stretcher, telephone report given by Sky Franks. Skin assessment conducted along with unit admission intake. Pt alert and oriented x4, no signs and symptoms of distress or discomfort. Will continue to monitor.

## 2021-12-22 NOTE — PROGRESS NOTES
Transition of Care: TBD; likely home with f/u with specialist/pcp    Transport Plan: TBD:  likely in car with family    RUR: 14%    Main Contact: Spouse Petrona Diss - [phone]    Discharge pending:  -pending likely ERCP procedure (to be done today 12/22)  -pending medical progress and care recommendations  -per attending, dc is likely greater than 48 hours        NOTE: Patient is alert and oriented x4 and confirmed demographics.    Baseline: DME - none  ADLs/IDALS: Independent   Previous Home Health: N/A  Previous SNF/IPR: N/A  ER Contact: Spouse - Lebron Reno - [phone]  Vivi Marrufo lives in a 1 level house with 4 steps to enter. Patient lives with his spouse. Patient is independent with ADLs/IDALs.    Patient does not utilize DMEs to ambulate. Patient's preferred pharmacy is [facility] located [facility] for his perscriptions. Patient's spouse is expected to transport at discharge.      CM noted from chart and in [facility] following  Piper Go RN, CRM

## 2021-12-22 NOTE — PROGRESS NOTES
Troponin results of 340 given to Dr. Julia Mckinnon and Dr. Corine Vivas prior to procedure. No new orders given.

## 2021-12-22 NOTE — PROGRESS NOTES
Shantell [facility].  1940  [id number]    Situation:    Scheduled Procedure: Procedure(s):  ENDOSCOPIC RETROGRADE CHOLANGIOPANCREATOGRAPHY (ERCP)  Verbal report received from: Ric Mendoza RN  Preoperative diagnosis: elevated lfts and cbd    Background:    Procedure: Procedure(s):  ENDOSCOPIC RETROGRADE CHOLANGIOPANCREATOGRAPHY (ERCP)  Physician performing procedure; Dr. Joe Brice RN    NPO Status/Last PO Intake: since midnight    Pregnancy Test:Not applicable If yes, result: none    Is the patient taking Blood Thinners: YES If yes, list: Coumadin and last taken unknown  Is the patient diabetic:no         Does the patient have a Pacemaker/Defibrillator in place?: no   Does the patient need antibiotics before/during/after procedure: yes , zosyn  If the patient is having a colon, How much prep was drank? na   What were the Colon prep results? na   Does the patient have SCD in place:no   Is patient on CONTACT precautions:no        If yes, what kind of CONTACT precautions:     Assessment:  Are the vital signs stable prior to patient coming to ENDO?  yes  Is the patient alert/oriented and able to sign consent for the procedures:yes  How does the patient's abdomen feel prior to coming to ENDO? round and soft yes   Does the patient have a patient IV in place?  yes     Recommendation:  Family or Friend present no     Permission to share finding with Family or Friend n/a

## 2021-12-22 NOTE — PROGRESS NOTES
[facility] [facility] Adult  Hospitalist Group                                                                                          Hospitalist Progress Note  Oh Milian MD  Answering service: [phone] from in house phone        Date of Service:  2021  NAME:  Thelma Lopez. :  1940  MRN:  [id number]      Admission Summary:   Thelma Lopez. is a 80 y.o. male with ap mhx CAD, oral cancer, hypertension, and lupus who presents as a transfer for abdominal pain, and is being admitted for cholecystitis. He states that he has been experiencing right upper quadrant pain intermittently for the past 3 weeks with some improvement with Tums. The day prior to admission, he started to develop chills, and alteration in his mental status so was taken to the ED for evaluation. In the [address] [place] [address] ED, he was found to have a CT finding of CBD dilated to 11 mm, pericholecystic fluid, and thickened gallbladder wall. He was treated with Zosyn and sent to [facility] for further care.     On arrival, he was hemodynamically stable. Labs were significant for , , T bili 5.1 alk phos 434, and lipase 180. Interval history / Subjective:   Patient seen and examined by me. Patient seen for follow-up of cholecystitis. At this time patient reports some abdominal pain. Patient denies other symptoms at this time. No acute events overnight. No acute complaints. Assessment & Plan:     Acute cholecystitis with dilated CBD  Gastroenterology on board  General surgery on board  EUS today  Likely ERCP today  Continue Zosyn    Gallstone pancreatitis  Continue to monitor  IV fluids  Pain control  Lipase over 3000    Type II NSTEMI secondary to demand ischemia  Cardiology is on board  Continue current management    Coronary artery disease  Resume home meds once not n.p.o.     Hypertension  Resume home meds    SLE  Resume home meds    Oral cancer  Stable    Code status: Full  DVT prophylaxis: SCD    Care Plan discussed with: Patient/Family and Nurse  Anticipated Disposition: Home w/Family  Anticipated Discharge: Greater than 48 hours     Hospital Problems  Date Reviewed: 9/3/2013          Codes Class Noted POA    Cholecystitis ICD-10-CM: K81.9  ICD-9-CM: 575.10  12/21/2021 Unknown                Review of Systems:   A comprehensive review of systems was negative except for that written in the HPI. Vital Signs:    Last 24hrs VS reviewed since prior progress note. Most recent are:  Visit Vitals  BP (!) 162/82   Pulse 72   Temp 98.8 °F (37.1 °C)   Resp 18   Ht 5' 7\" (1.702 m)   Wt 67.1 kg (148 lb)   SpO2 94%   BMI 23.18 kg/m²         Intake/Output Summary (Last 24 hours) at 12/22/2021 1312  Last data filed at 12/21/2021 2136  Gross per 24 hour   Intake    Output 1100 ml   Net -1100 ml        Physical Examination:     I had a face to face encounter with this patient and independently examined them on 12/22/2021 as outlined below:          Constitutional:  No acute distress, cooperative, pleasant    ENT:  Oral mucosa moist, oropharynx benign. Left-sided face swelling. Resp:  CTA bilaterally. No wheezing/rhonchi/rales. No accessory muscle use   CV:  Regular rhythm, normal rate, no murmurs, gallops, rubs    GI:  Soft, non distended. Pain to palpation right upper quadrant. Normoactive bowel sounds, no hepatosplenomegaly     Musculoskeletal:  No edema, warm, 2+ pulses throughout    Neurologic:  Moves all extremities. AAOx3, left-sided facial droop.             Data Review:    Review and/or order of clinical lab test  Review and/or order of tests in the radiology section of CPT  Review and/or order of tests in the medicine section of CPT      Labs:     Recent Labs     12/22/21  0143 12/21/21  1141   WBC 4.8 6.1   HGB 7.7* 8.0*   HCT 22.5* 23.7*   PLT 55* 57*     Recent Labs     12/22/21  0143 12/21/21  0537   * 135*   K 3.7 3.8    107   CO2 22 22   BUN 15 23*   CREA 0.81 0.89 GLU 92 55*   CA 8.2* 8.4*     Recent Labs     12/22/21  0143 12/21/21  1141 12/21/21  0537   *  --  354*   *  --  468*   TBILI 3.3*  --  2.6*   TP 5.3*  --  5.4*   ALB 2.4*  --  2.6*   GLOB 2.9  --  2.8   LPSE >3,000* >3,000*  --      Recent Labs     12/22/21  0143 12/21/21  1141   INR 1.3* 1.7*   PTP 13.5* 17.5*   APTT  --  46.8*      No results for input(s): FE, TIBC, PSAT, FERR in the last 72 hours. No results found for: FOL, RBCF   No results for input(s): PH, PCO2, PO2 in the last 72 hours. No results for input(s): CPK, CKNDX, TROIQ in the last 72 hours.     No lab exists for component: CPKMB  Lab Results   Component Value Date/Time    Cholesterol, total 181 08/23/2012 09:06 AM    HDL Cholesterol 58 08/23/2012 09:06 AM    LDL, calculated 103 (H) 08/23/2012 09:06 AM    Triglyceride 98 08/23/2012 09:06 AM     No results found for: GLUCPOC  No results found for: COLOR, APPRN, SPGRU, REFSG, MITZI, PROTU, GLUCU, KETU, BILU, UROU, MARCELINO, LEUKU, GLUKE, EPSU, BACTU, WBCU, RBCU, CASTS, UCRY      Medications Reviewed:     Current Facility-Administered Medications   Medication Dose Route Frequency    lactated Ringers infusion  150 mL/hr IntraVENous CONTINUOUS    hydrALAZINE (APRESOLINE) 20 mg/mL injection 10 mg  10 mg IntraVENous Q6H PRN    sodium chloride (NS) flush 5-40 mL  5-40 mL IntraVENous Q8H    sodium chloride (NS) flush 5-40 mL  5-40 mL IntraVENous PRN    acetaminophen (TYLENOL) tablet 650 mg  650 mg Oral Q6H PRN    Or    acetaminophen (TYLENOL) suppository 650 mg  650 mg Rectal Q6H PRN    piperacillin-tazobactam (ZOSYN) 3.375 g in 0.9% sodium chloride (MBP/ADV) 100 mL MBP  3.375 g IntraVENous Q8H    gabapentin (NEURONTIN) capsule 300 mg  300 mg Oral BID    hydrOXYchloroQUINE (PLAQUENIL) tablet 200 mg  200 mg Oral BID    predniSONE (DELTASONE) tablet 2 mg  2 mg Oral DAILY WITH BREAKFAST    aspirin delayed-release tablet 81 mg  81 mg Oral DAILY    metoprolol succinate (TOPROL-XL) XL tablet 25 mg  25 mg Oral DAILY     ______________________________________________________________________  EXPECTED LENGTH OF STAY: 2d 4h  ACTUAL LENGTH OF STAY:          [id number] [facility] Vidal Funes MD

## 2021-12-22 NOTE — ANESTHESIA POSTPROCEDURE EVALUATION
Procedure(s):  ENDOSCOPIC RETROGRADE CHOLANGIOPANCREATOGRAPHY (ERCP)  ENDOSCOPIC STONE EXTRACTION/BALLOON SWEEP  BILIARY DILATATION. general    Anesthesia Post Evaluation        Patient location during evaluation: PACU  Patient participation: complete - patient participated  Level of consciousness: awake and alert  Pain management: adequate  Airway patency: patent  Anesthetic complications: no  Cardiovascular status: acceptable  Respiratory status: acceptable  Hydration status: acceptable  Comments: I have seen and evaluated the patient and is ready for discharge. Chivo Anderson MD    Post anesthesia nausea and vomiting:  none      INITIAL Post-op Vital signs:   Vitals Value Taken Time   /77 12/22/21 1705   Temp 36.8 °C (98.2 °F) 12/22/21 1658   Pulse 66 12/22/21 1709   Resp 19 12/22/21 1709   SpO2 95 % 12/22/21 1709   Vitals shown include unvalidated device data.

## 2021-12-23 LAB
ALBUMIN SERPL-MCNC: 2.8 G/DL (ref 3.5–5)
ALBUMIN/GLOB SERPL: 0.8 {RATIO} (ref 1.1–2.2)
ALP SERPL-CCNC: 640 U/L (ref 45–117)
ALT SERPL-CCNC: 258 U/L (ref 12–78)
ANION GAP SERPL CALC-SCNC: 9 MMOL/L (ref 5–15)
AST SERPL-CCNC: 137 U/L (ref 15–37)
BASOPHILS # BLD: 0 K/UL (ref 0–0.1)
BASOPHILS # BLD: 0 K/UL (ref 0–0.1)
BASOPHILS NFR BLD: 0 % (ref 0–1)
BASOPHILS NFR BLD: 0 % (ref 0–1)
BILIRUB DIRECT SERPL-MCNC: 1.6 MG/DL (ref 0–0.2)
BILIRUB SERPL-MCNC: 2.2 MG/DL (ref 0.2–1)
BUN SERPL-MCNC: 13 MG/DL (ref 6–20)
BUN/CREAT SERPL: 22 (ref 12–20)
CALCIUM SERPL-MCNC: 8.2 MG/DL (ref 8.5–10.1)
CHLORIDE SERPL-SCNC: 108 MMOL/L (ref 97–108)
CO2 SERPL-SCNC: 17 MMOL/L (ref 21–32)
CREAT SERPL-MCNC: 0.58 MG/DL (ref 0.7–1.3)
DIFFERENTIAL METHOD BLD: ABNORMAL
DIFFERENTIAL METHOD BLD: ABNORMAL
EOSINOPHIL # BLD: 0 K/UL (ref 0–0.4)
EOSINOPHIL # BLD: 0 K/UL (ref 0–0.4)
EOSINOPHIL NFR BLD: 0 % (ref 0–7)
EOSINOPHIL NFR BLD: 0 % (ref 0–7)
ERYTHROCYTE [DISTWIDTH] IN BLOOD BY AUTOMATED COUNT: 18 % (ref 11.5–14.5)
ERYTHROCYTE [DISTWIDTH] IN BLOOD BY AUTOMATED COUNT: 18 % (ref 11.5–14.5)
GLOBULIN SER CALC-MCNC: 3.4 G/DL (ref 2–4)
GLUCOSE BLD STRIP.AUTO-MCNC: 102 MG/DL (ref 65–117)
GLUCOSE BLD STRIP.AUTO-MCNC: 41 MG/DL (ref 65–117)
GLUCOSE BLD STRIP.AUTO-MCNC: 50 MG/DL (ref 65–117)
GLUCOSE BLD STRIP.AUTO-MCNC: 75 MG/DL (ref 65–117)
GLUCOSE SERPL-MCNC: 29 MG/DL (ref 65–100)
HCT VFR BLD AUTO: 24.5 % (ref 36.6–50.3)
HCT VFR BLD AUTO: 27.6 % (ref 36.6–50.3)
HGB BLD-MCNC: 8 G/DL (ref 12.1–17)
HGB BLD-MCNC: 9.1 G/DL (ref 12.1–17)
IMM GRANULOCYTES # BLD AUTO: 0 K/UL (ref 0–0.04)
IMM GRANULOCYTES # BLD AUTO: 0.1 K/UL (ref 0–0.04)
IMM GRANULOCYTES NFR BLD AUTO: 0 % (ref 0–0.5)
IMM GRANULOCYTES NFR BLD AUTO: 1 % (ref 0–0.5)
LIPASE SERPL-CCNC: 1098 U/L (ref 73–393)
LYMPHOCYTES # BLD: 0.2 K/UL (ref 0.8–3.5)
LYMPHOCYTES # BLD: 0.2 K/UL (ref 0.8–3.5)
LYMPHOCYTES NFR BLD: 2 % (ref 12–49)
LYMPHOCYTES NFR BLD: 3 % (ref 12–49)
MCH RBC QN AUTO: 32.9 PG (ref 26–34)
MCH RBC QN AUTO: 33.1 PG (ref 26–34)
MCHC RBC AUTO-ENTMCNC: 32.7 G/DL (ref 30–36.5)
MCHC RBC AUTO-ENTMCNC: 33 G/DL (ref 30–36.5)
MCV RBC AUTO: 101.2 FL (ref 80–99)
MCV RBC AUTO: 99.6 FL (ref 80–99)
MONOCYTES # BLD: 0.5 K/UL (ref 0–1)
MONOCYTES # BLD: 0.8 K/UL (ref 0–1)
MONOCYTES NFR BLD: 11 % (ref 5–13)
MONOCYTES NFR BLD: 7 % (ref 5–13)
NEUTS SEG # BLD: 6 K/UL (ref 1.8–8)
NEUTS SEG # BLD: 7 K/UL (ref 1.8–8)
NEUTS SEG NFR BLD: 86 % (ref 32–75)
NEUTS SEG NFR BLD: 90 % (ref 32–75)
NRBC # BLD: 0 K/UL (ref 0–0.01)
NRBC # BLD: 0 K/UL (ref 0–0.01)
NRBC BLD-RTO: 0 PER 100 WBC
NRBC BLD-RTO: 0 PER 100 WBC
PLATELET # BLD AUTO: 56 K/UL (ref 150–400)
PLATELET # BLD AUTO: 75 K/UL (ref 150–400)
PMV BLD AUTO: 11.7 FL (ref 8.9–12.9)
POTASSIUM SERPL-SCNC: 4.2 MMOL/L (ref 3.5–5.1)
PROT SERPL-MCNC: 6.2 G/DL (ref 6.4–8.2)
RBC # BLD AUTO: 2.42 M/UL (ref 4.1–5.7)
RBC # BLD AUTO: 2.77 M/UL (ref 4.1–5.7)
RBC MORPH BLD: ABNORMAL
SERVICE CMNT-IMP: ABNORMAL
SERVICE CMNT-IMP: ABNORMAL
SERVICE CMNT-IMP: NORMAL
SERVICE CMNT-IMP: NORMAL
SODIUM SERPL-SCNC: 134 MMOL/L (ref 136–145)
WBC # BLD AUTO: 7 K/UL (ref 4.1–11.1)
WBC # BLD AUTO: 7.8 K/UL (ref 4.1–11.1)

## 2021-12-23 PROCEDURE — 87040 BLOOD CULTURE FOR BACTERIA: CPT

## 2021-12-23 PROCEDURE — 74011000258 HC RX REV CODE- 258: Performed by: FAMILY MEDICINE

## 2021-12-23 PROCEDURE — 74011250637 HC RX REV CODE- 250/637: Performed by: STUDENT IN AN ORGANIZED HEALTH CARE EDUCATION/TRAINING PROGRAM

## 2021-12-23 PROCEDURE — 80076 HEPATIC FUNCTION PANEL: CPT

## 2021-12-23 PROCEDURE — 36415 COLL VENOUS BLD VENIPUNCTURE: CPT

## 2021-12-23 PROCEDURE — 83690 ASSAY OF LIPASE: CPT

## 2021-12-23 PROCEDURE — 74011636637 HC RX REV CODE- 636/637: Performed by: INTERNAL MEDICINE

## 2021-12-23 PROCEDURE — 74011250636 HC RX REV CODE- 250/636: Performed by: FAMILY MEDICINE

## 2021-12-23 PROCEDURE — 99232 SBSQ HOSP IP/OBS MODERATE 35: CPT | Performed by: SURGERY

## 2021-12-23 PROCEDURE — 74011250637 HC RX REV CODE- 250/637: Performed by: INTERNAL MEDICINE

## 2021-12-23 PROCEDURE — 82962 GLUCOSE BLOOD TEST: CPT

## 2021-12-23 PROCEDURE — 85025 COMPLETE CBC W/AUTO DIFF WBC: CPT

## 2021-12-23 PROCEDURE — 80048 BASIC METABOLIC PNL TOTAL CA: CPT

## 2021-12-23 PROCEDURE — 94760 N-INVAS EAR/PLS OXIMETRY 1: CPT

## 2021-12-23 PROCEDURE — 65270000032 HC RM SEMIPRIVATE

## 2021-12-23 RX ORDER — DEXTROSE MONOHYDRATE AND SODIUM CHLORIDE 5; .9 G/100ML; G/100ML
50 INJECTION, SOLUTION INTRAVENOUS CONTINUOUS
Status: DISCONTINUED | OUTPATIENT
Start: 2021-12-23 | End: 2021-12-28 | Stop reason: HOSPADM

## 2021-12-23 RX ADMIN — HYDROCHLOROTHIAZIDE 12.5 MG: 25 TABLET ORAL at 08:56

## 2021-12-23 RX ADMIN — DEXTROSE MONOHYDRATE AND SODIUM CHLORIDE 150 ML/HR: 5; .9 INJECTION, SOLUTION INTRAVENOUS at 08:52

## 2021-12-23 RX ADMIN — PIPERACILLIN SODIUM AND TAZOBACTAM SODIUM 3.38 G: 3; .375 INJECTION, POWDER, LYOPHILIZED, FOR SOLUTION INTRAVENOUS at 23:48

## 2021-12-23 RX ADMIN — HYDROXYCHLOROQUINE SULFATE 200 MG: 200 TABLET ORAL at 17:16

## 2021-12-23 RX ADMIN — LISINOPRIL 10 MG: 10 TABLET ORAL at 17:17

## 2021-12-23 RX ADMIN — ATORVASTATIN CALCIUM 40 MG: 40 TABLET, FILM COATED ORAL at 17:17

## 2021-12-23 RX ADMIN — GABAPENTIN 300 MG: 300 CAPSULE ORAL at 08:54

## 2021-12-23 RX ADMIN — DEXTROSE MONOHYDRATE AND SODIUM CHLORIDE 150 ML/HR: 5; .9 INJECTION, SOLUTION INTRAVENOUS at 16:13

## 2021-12-23 RX ADMIN — HYDROXYCHLOROQUINE SULFATE 200 MG: 200 TABLET ORAL at 08:56

## 2021-12-23 RX ADMIN — Medication 10 ML: at 16:14

## 2021-12-23 RX ADMIN — METOPROLOL SUCCINATE 25 MG: 25 TABLET, FILM COATED, EXTENDED RELEASE ORAL at 08:54

## 2021-12-23 RX ADMIN — GABAPENTIN 300 MG: 300 CAPSULE ORAL at 17:16

## 2021-12-23 RX ADMIN — PREDNISONE 2 MG: 1 TABLET ORAL at 08:56

## 2021-12-23 RX ADMIN — PIPERACILLIN SODIUM AND TAZOBACTAM SODIUM 3.38 G: 3; .375 INJECTION, POWDER, LYOPHILIZED, FOR SOLUTION INTRAVENOUS at 08:59

## 2021-12-23 RX ADMIN — Medication 10 ML: at 16:15

## 2021-12-23 RX ADMIN — PIPERACILLIN SODIUM AND TAZOBACTAM SODIUM 3.38 G: 3; .375 INJECTION, POWDER, LYOPHILIZED, FOR SOLUTION INTRAVENOUS at 16:19

## 2021-12-23 NOTE — PROGRESS NOTES
Progress Note    Patient: Dimitrios Officer. MRN: [id number]  SSN: [id number]    YOB: 1940  Age: 80 y.o. Sex: male      Admit Date: 2021    1 Day Post-Op    Procedure:  Procedure(s):  ENDOSCOPIC RETROGRADE CHOLANGIOPANCREATOGRAPHY (ERCP)  ENDOSCOPIC STONE EXTRACTION/BALLOON SWEEP  BILIARY DILATATION    Subjective:     Patient without abdominal pain. Objective:     Visit Vitals  BP (!) 155/79   Pulse 68   Temp 97.8 °F (36.6 °C)   Resp 18   Ht 5' 7\" (1.702 m)   Wt 148 lb (67.1 kg)   SpO2 97%   BMI 23.18 kg/m²       Temp (24hrs), Av.1 °F (36.7 °C), Min:97.6 °F (36.4 °C), Max:98.4 °F (36.9 °C)      Physical Exam:    Alert and oriented in no acute distress  Lungs clear bilaterally  Heart regular rate and rhythm  Abdomen soft nontender nondistended    Data Review: images and reports reviewed  ERCP- -Bile duct stones-extracted  -Bile duct stricture dilated  Lab Review: All lab results for the last 24 hours reviewed. Recent Results (from the past 24 hour(s))   CBC WITH AUTOMATED DIFF    Collection Time: 21  6:38 AM   Result Value Ref Range    WBC 7.0 4.1 - 11.1 K/uL    RBC 2.42 (L) 4.10 - 5.70 M/uL    HGB 8.0 (L) 12.1 - 17.0 g/dL    HCT 24.5 (L) 36.6 - 50.3 %    .2 (H) 80.0 - 99.0 FL    MCH 33.1 26.0 - 34.0 PG    MCHC 32.7 30.0 - 36.5 g/dL    RDW 18.0 (H) 11.5 - 14.5 %    PLATELET 56 (L) 044 - 400 K/uL    NRBC 0.0 0  WBC    ABSOLUTE NRBC 0.00 0.00 - 0.01 K/uL    NEUTROPHILS 86 (H) 32 - 75 %    LYMPHOCYTES 3 (L) 12 - 49 %    MONOCYTES 11 5 - 13 %    EOSINOPHILS 0 0 - 7 %    BASOPHILS 0 0 - 1 %    IMMATURE GRANULOCYTES 0 0.0 - 0.5 %    ABS. NEUTROPHILS 6.0 1.8 - 8.0 K/UL    ABS. LYMPHOCYTES 0.2 (L) 0.8 - 3.5 K/UL    ABS. MONOCYTES 0.8 0.0 - 1.0 K/UL    ABS. EOSINOPHILS 0.0 0.0 - 0.4 K/UL    ABS. BASOPHILS 0.0 0.0 - 0.1 K/UL    ABS. IMM.  GRANS. 0.0 0.00 - 0.04 K/UL    DF SMEAR SCANNED      RBC COMMENTS ANISOCYTOSIS  1+        RBC COMMENTS MACROCYTOSIS  1+       LIPASE Collection Time: 12/23/21  6:38 AM   Result Value Ref Range    Lipase 1,098 (H) 73 - 127 U/L   METABOLIC PANEL, BASIC    Collection Time: 12/23/21  6:38 AM   Result Value Ref Range    Sodium 134 (L) 136 - 145 mmol/L    Potassium 4.2 3.5 - 5.1 mmol/L    Chloride 108 97 - 108 mmol/L    CO2 17 (L) 21 - 32 mmol/L    Anion gap 9 5 - 15 mmol/L    Glucose 29 (LL) 65 - 100 mg/dL    BUN 13 6 - 20 MG/DL    Creatinine 0.58 (L) 0.70 - 1.30 MG/DL    BUN/Creatinine ratio 22 (H) 12 - 20      GFR est AA >60 >60 ml/min/1.73m2    GFR est non-AA >60 >60 ml/min/1.73m2    Calcium 8.2 (L) 8.5 - 10.1 MG/DL   GLUCOSE, POC    Collection Time: 12/23/21  8:16 AM   Result Value Ref Range    Glucose (POC) 41 (LL) 65 - 117 mg/dL    Performed by Connie Hamilton    GLUCOSE, POC    Collection Time: 12/23/21  8:38 AM   Result Value Ref Range    Glucose (POC) 50 (LL) 65 - 117 mg/dL    Performed by Connie Hamilton    GLUCOSE, POC    Collection Time: 12/23/21  9:31 AM   Result Value Ref Range    Glucose (POC) 102 65 - 117 mg/dL    Performed by BENTLEY MARR    CBC WITH AUTOMATED DIFF    Collection Time: 12/23/21  9:49 AM   Result Value Ref Range    WBC 7.8 4.1 - 11.1 K/uL    RBC 2.77 (L) 4.10 - 5.70 M/uL    HGB 9.1 (L) 12.1 - 17.0 g/dL    HCT 27.6 (L) 36.6 - 50.3 %    MCV 99.6 (H) 80.0 - 99.0 FL    MCH 32.9 26.0 - 34.0 PG    MCHC 33.0 30.0 - 36.5 g/dL    RDW 18.0 (H) 11.5 - 14.5 %    PLATELET 75 (L) 723 - 400 K/uL    MPV 11.7 8.9 - 12.9 FL    NRBC 0.0 0  WBC    ABSOLUTE NRBC 0.00 0.00 - 0.01 K/uL    NEUTROPHILS 90 (H) 32 - 75 %    LYMPHOCYTES 2 (L) 12 - 49 %    MONOCYTES 7 5 - 13 %    EOSINOPHILS 0 0 - 7 %    BASOPHILS 0 0 - 1 %    IMMATURE GRANULOCYTES 1 (H) 0.0 - 0.5 %    ABS. NEUTROPHILS 7.0 1.8 - 8.0 K/UL    ABS. LYMPHOCYTES 0.2 (L) 0.8 - 3.5 K/UL    ABS. MONOCYTES 0.5 0.0 - 1.0 K/UL    ABS. EOSINOPHILS 0.0 0.0 - 0.4 K/UL    ABS. BASOPHILS 0.0 0.0 - 0.1 K/UL    ABS. IMM.  GRANS. 0.1 (H) 0.00 - 0.04 K/UL    DF SMEAR SCANNED      RBC COMMENTS ANISOCYTOSIS  1+        RBC COMMENTS OVALOCYTES  PRESENT       HEPATIC FUNCTION PANEL    Collection Time: 12/23/21  9:49 AM   Result Value Ref Range    Protein, total 6.2 (L) 6.4 - 8.2 g/dL    Albumin 2.8 (L) 3.5 - 5.0 g/dL    Globulin 3.4 2.0 - 4.0 g/dL    A-G Ratio 0.8 (L) 1.1 - 2.2      Bilirubin, total 2.2 (H) 0.2 - 1.0 MG/DL    Bilirubin, direct 1.6 (H) 0.0 - 0.2 MG/DL    Alk. phosphatase 640 (H) 45 - 117 U/L    AST (SGOT) 137 (H) 15 - 37 U/L    ALT (SGPT) 258 (H) 12 - 78 U/L       Assessment:     Hospital Problems  Date Reviewed: 9/3/2013          Codes Class Noted POA    Cholecystitis ICD-10-CM: K81.9  ICD-9-CM: 575.10  12/21/2021 Unknown              Plan/Recommendations/Medical Decision Making:   Status post ERCP with sphincterotomy and dilation of stricture. Appears to be overall doing well. However he does still have elevated bilirubin. Would like to see this coming down more prior to cholecystectomy. And concerned elevated bilirubin may be some swelling where the sphincterotomy and dilation was done. Once this is improved we will proceed with lap trevor.

## 2021-12-23 NOTE — PROGRESS NOTES
Transition of Care: TBD; likely home with f/u with specialist/pcp     Transport Plan: TBD:  likely in car with family     RUR: 14%     Main Contact: Spouse - Debby Caal - [phone]     Discharge pending:  -s/p ERCP procedure (done on 12/22)  -pending medical progress and care recommendations  -per attending, dc is likely 24-48 hours           NOTE: Patient is alert and oriented x4 and confirmed demographics.    Baseline: DME - none  ADLs/IDALS: Independent   Previous Home Health: N/A  Previous SNF/IPR: N/A  ER Contact: Spouse - Debby Spring 69 lives in [id number] [place] [address] with 4 steps to enter. Patient lives with his spouse. Patient is independent with ADLs/IDALs.    Patient does not utilize DMEs to ambulate. Patient's preferred pharmacy is [facility] located [facility] for his perscriptions.  Patient's spouse is expected to transport at discharge.      CM noted from chart and in [facility] following  Hamilton Barnard RN, CRM

## 2021-12-23 NOTE — PROGRESS NOTES
[facility] Adult  Hospitalist Group                                                                                          Hospitalist Progress Note  Simone Sweeney MD  Answering service: [phone] from in house phone        Date of Service:  2021  NAME:  Fay Márquez. :  1940  MRN:  [id number]      Admission Summary:     Patient presents with abdominal pain and admitted for acute cholecystitis. Patient also with gallstone pancreatitis. Patient evaluated by GI, s/p ERCP  with biliary sphincterotomy and biliary stone removal.  Abdominal pain is improving and lipase levels improving. Plan for general surgery to do cholecystectomy but significant hypoglycemia this a.m. but patient asymptomatic. Interval history / Subjective:       Patient denies any abdominal pain this AM.  Denies nausea vomiting.      Assessment & Plan:     Gallstone pancreatitis  -GI evaluated the patient, s/p ERCP  with biliary sphincterotomy and biliary stone removal  -Overall improving clinically, lipase also improving    Acute cholecystitis  -Patient evaluated by general surgery  -Patient still with elevated bilirubin likely due to swelling with a sphincterotomy and dilatation was done  -Plan for lap trevor once this is improved  -On empiric antibiotic with Zosyn    Hypoglycemia  -Patient with prolonged fasting, also with acute pancreatic insufficiency, probable culprit for hypoglycemia  -Doubt that sepsis or adrenal insufficiency is playing a role  -Patient was resumed on clear liquid today, start D5, check blood cultures    Elevated troponin due to demand ischemia  -Cardiology previously evaluated patient  -Echo shows EF of 45 to 50%, wall motion abnormalities consistent with prior coronary artery disease history  -Patient is well optimized from cardiac standpoint for surgery    Hypertension  -Continue hydrochlorothiazide, lisinopril, metoprolol    SLE  -Patient has been off of prednisone but restarted on 12/22  -Otherwise stable    Dyslipidemia  -Continue Lipitor    Code status: Full  DVT prophylaxis: SCDs    Care Plan discussed with: Patient/Family  Anticipated Disposition: Home w/Family  Anticipated Discharge: 24 hours to 48 hours     Hospital Problems  Date Reviewed: 9/3/2013          Codes Class Noted POA    Cholecystitis ICD-10-CM: K81.9  ICD-9-CM: 575.10  12/21/2021 Unknown                Review of Systems:   A comprehensive review of systems was negative except for that written in the HPI. Vital Signs:    Last 24hrs VS reviewed since prior progress note. Most recent are:  Visit Vitals  /62   Pulse (!) 59   Temp 97 °F (36.1 °C)   Resp 18   Ht 5' 7\" (1.702 m)   Wt 67.1 kg (148 lb)   SpO2 98%   BMI 23.18 kg/m²       No intake or output data in the 24 hours ending 12/23/21 1758     Physical Examination:     I had a face to face encounter with this patient and independently examined them on 12/23/2021 as outlined below:          Constitutional:  No acute distress, cooperative, pleasant    ENT:  Oral mucosa moist, oropharynx benign. Resp:  CTA bilaterally. No wheezing/rhonchi/rales. No accessory muscle use   CV:  Regular rhythm, normal rate, no murmurs, gallops, rubs    GI:  Soft, non distended, non tender. normoactive bowel sounds, no hepatosplenomegaly     Musculoskeletal:  No edema, warm, 2+ pulses throughout    Neurologic:  Moves all extremities.   AAOx3, CN II-XII reviewed            Data Review:    Review and/or order of clinical lab test      Labs:     Recent Labs     12/23/21  0949 12/23/21  0638   WBC 7.8 7.0   HGB 9.1* 8.0*   HCT 27.6* 24.5*   PLT 75* 56*     Recent Labs     12/23/21  0638 12/22/21  0143 12/21/21  0537   * 135* 135*   K 4.2 3.7 3.8    107 107   CO2 17* 22 22   BUN 13 15 23*   CREA 0.58* 0.81 0.89   GLU 29* 92 55*   CA 8.2* 8.2* 8.4*     Recent Labs     12/23/21  0949 12/23/21  0638 12/22/21  0143 12/21/21  1141 12/21/21  0537   *  --  304* --  354*   *  --  561*  --  468*   TBILI 2.2*  --  3.3*  --  2.6*   TP 6.2*  --  5.3*  --  5.4*   ALB 2.8*  --  2.4*  --  2.6*   GLOB 3.4  --  2.9  --  2.8   LPSE  --  1,098* >3,000* >3,000*  --      Recent Labs     12/22/21  0143 12/21/21  1141   INR 1.3* 1.7*   PTP 13.5* 17.5*   APTT  --  46.8*      No results for input(s): FE, TIBC, PSAT, FERR in the last 72 hours. No results found for: FOL, RBCF   No results for input(s): PH, PCO2, PO2 in the last 72 hours. No results for input(s): CPK, CKNDX, TROIQ in the last 72 hours.     No lab exists for component: CPKMB  Lab Results   Component Value Date/Time    Cholesterol, total 181 08/23/2012 09:06 AM    HDL Cholesterol 58 08/23/2012 09:06 AM    LDL, calculated 103 (H) 08/23/2012 09:06 AM    Triglyceride 98 08/23/2012 09:06 AM     Lab Results   Component Value Date/Time    Glucose (POC) 102 12/23/2021 09:31 AM    Glucose (POC) 50 (LL) 12/23/2021 08:38 AM    Glucose (POC) 41 (LL) 12/23/2021 08:16 AM     No results found for: COLOR, APPRN, SPGRU, REFSG, MITZI, PROTU, GLUCU, KETU, BILU, UROU, MARCELINO, LEUKU, GLUKE, EPSU, BACTU, WBCU, RBCU, CASTS, UCRY      Medications Reviewed:     Current Facility-Administered Medications   Medication Dose Route Frequency    dextrose 5% and 0.9% NaCl infusion  150 mL/hr IntraVENous CONTINUOUS    sodium chloride (NS) flush 5-40 mL  5-40 mL IntraVENous Q8H    sodium chloride (NS) flush 5-40 mL  5-40 mL IntraVENous PRN    atorvastatin (LIPITOR) tablet 40 mg  40 mg Oral QPM    hydroCHLOROthiazide (HYDRODIURIL) tablet 12.5 mg  12.5 mg Oral DAILY    lisinopriL (PRINIVIL, ZESTRIL) tablet 10 mg  10 mg Oral QPM    morphine injection 2 mg  2 mg IntraVENous Q4H PRN    hydrALAZINE (APRESOLINE) 20 mg/mL injection 10 mg  10 mg IntraVENous Q6H PRN    sodium chloride (NS) flush 5-40 mL  5-40 mL IntraVENous Q8H    sodium chloride (NS) flush 5-40 mL  5-40 mL IntraVENous PRN    acetaminophen (TYLENOL) tablet 650 mg  650 mg Oral Q6H PRN    Or  acetaminophen (TYLENOL) suppository 650 mg  650 mg Rectal Q6H PRN    piperacillin-tazobactam (ZOSYN) 3.375 g in 0.9% sodium chloride (MBP/ADV) 100 mL MBP  3.375 g IntraVENous Q8H    gabapentin (NEURONTIN) capsule 300 mg  300 mg Oral BID    hydrOXYchloroQUINE (PLAQUENIL) tablet 200 mg  200 mg Oral BID    predniSONE (DELTASONE) tablet 2 mg  2 mg Oral DAILY WITH BREAKFAST    [Held by provider] aspirin delayed-release tablet 81 mg  81 mg Oral DAILY    metoprolol succinate (TOPROL-XL) XL tablet 25 mg  25 mg Oral DAILY     ______________________________________________________________________  EXPECTED LENGTH OF STAY: 4d 9h  ACTUAL LENGTH OF STAY:          3                 Nayely Sierra MD

## 2021-12-23 NOTE — PROGRESS NOTES
118 [facility].  217 [facility] 140 [facility], [address]  [phone]                     GI PROGRESS NOTE    Patient Name: Ronny Ervin. : 1940      MRN: [id number]  Admit Date: 2021  Today's Date: 2021    Subjective:     Reports feeling good this morning. Denies abdominal pain or nausea. Lip is a little sore from the procedure yesterday. Had large formed bowel movement yesterday. Episode of hypoglycemia this morning, asymptomatic. Would like to eat some food today. Objective:     Blood pressure (!) 155/79, pulse 68, temperature 97.8 °F (36.6 °C), resp. rate 18, height 5' 7\" (1.702 m), weight 67.1 kg (148 lb), SpO2 97 %. Physical Exam:  General appearance: cooperative, no distress, appears stated age  Skin: Extremities and face reveal no rashes. Mild jaundice, scattered scabbed/bruises on extremities. HEENT: Sclerae icteric. Left facial droop, right upper lip bruised  Cardiovascular: Regular rate and rhythm. +murmur, No gallops or rubs. Respiratory: Comfortable breathing with no accessory muscle use. Clear breath sounds with no wheezes, rales, or rhonchi. GI: Abdomen nondistended, soft, and non tender Normal active bowel sounds. Rectal: Deferred   Musculoskeletal: No pitting edema of the lower legs. Neurological: Gross memory appears intact. Patient is alert and oriented. Psychiatric: Mood appears appropriate with good judgement.   No anxiety or agitation      Data Review:    Recent Results (from the past 24 hour(s))   TROPONIN-HIGH SENSITIVITY    Collection Time: 21 11:38 AM   Result Value Ref Range    Troponin-High Sensitivity 340 (HH) 0 - 76 ng/L   CBC WITH AUTOMATED DIFF    Collection Time: 21  6:38 AM   Result Value Ref Range    WBC 7.0 4.1 - 11.1 K/uL    RBC 2.42 (L) 4.10 - 5.70 M/uL    HGB 8.0 (L) 12.1 - 17.0 g/dL    HCT 24.5 (L) 36.6 - 50.3 %    .2 (H) 80.0 - 99.0 FL    MCH 33.1 26.0 - 34.0 PG    MCHC 32.7 30.0 - 36.5 g/dL    RDW 18.0 (H) 11.5 - 14.5 %    PLATELET 56 (L) 137 - 400 K/uL    NRBC 0.0 0  WBC    ABSOLUTE NRBC 0.00 0.00 - 0.01 K/uL    NEUTROPHILS 86 (H) 32 - 75 %    LYMPHOCYTES 3 (L) 12 - 49 %    MONOCYTES 11 5 - 13 %    EOSINOPHILS 0 0 - 7 %    BASOPHILS 0 0 - 1 %    IMMATURE GRANULOCYTES 0 0.0 - 0.5 %    ABS. NEUTROPHILS 6.0 1.8 - 8.0 K/UL    ABS. LYMPHOCYTES 0.2 (L) 0.8 - 3.5 K/UL    ABS. MONOCYTES 0.8 0.0 - 1.0 K/UL    ABS. EOSINOPHILS 0.0 0.0 - 0.4 K/UL    ABS. BASOPHILS 0.0 0.0 - 0.1 K/UL    ABS. IMM.  GRANS. 0.0 0.00 - 0.04 K/UL    DF SMEAR SCANNED      RBC COMMENTS ANISOCYTOSIS  1+        RBC COMMENTS MACROCYTOSIS  1+       LIPASE    Collection Time: 12/23/21  6:38 AM   Result Value Ref Range    Lipase 1,098 (H) 73 - 819 U/L   METABOLIC PANEL, BASIC    Collection Time: 12/23/21  6:38 AM   Result Value Ref Range    Sodium 134 (L) 136 - 145 mmol/L    Potassium 4.2 3.5 - 5.1 mmol/L    Chloride 108 97 - 108 mmol/L    CO2 17 (L) 21 - 32 mmol/L    Anion gap 9 5 - 15 mmol/L    Glucose 29 (LL) 65 - 100 mg/dL    BUN 13 6 - 20 MG/DL    Creatinine 0.58 (L) 0.70 - 1.30 MG/DL    BUN/Creatinine ratio 22 (H) 12 - 20      GFR est AA >60 >60 ml/min/1.73m2    GFR est non-AA >60 >60 ml/min/1.73m2    Calcium 8.2 (L) 8.5 - 10.1 MG/DL   GLUCOSE, POC    Collection Time: 12/23/21  8:16 AM   Result Value Ref Range    Glucose (POC) 41 (LL) 65 - 117 mg/dL    Performed by Hilario 55, POC    Collection Time: 12/23/21  8:38 AM   Result Value Ref Range    Glucose (POC) 50 (LL) 65 - 117 mg/dL    Performed by Marjorie Le    GLUCOSE, POC    Collection Time: 12/23/21  9:31 AM   Result Value Ref Range    Glucose (POC) 102 65 - 117 mg/dL    Performed by BENTLEY MARR    CBC WITH AUTOMATED DIFF    Collection Time: 12/23/21  9:49 AM   Result Value Ref Range    WBC 7.8 4.1 - 11.1 K/uL    RBC 2.77 (L) 4.10 - 5.70 M/uL    HGB 9.1 (L) 12.1 - 17.0 g/dL    HCT 27.6 (L) 36.6 - 50.3 %    MCV 99.6 (H) 80.0 - 99.0 FL    MCH 32.9 26.0 - 34.0 PG    MCHC 33.0 30.0 - 36.5 g/dL    RDW 18.0 (H) 11.5 - 14.5 %    PLATELET 75 (L) 114 - 400 K/uL    MPV 11.7 8.9 - 12.9 FL    NRBC 0.0 0  WBC    ABSOLUTE NRBC 0.00 0.00 - 0.01 K/uL    NEUTROPHILS 90 (H) 32 - 75 %    LYMPHOCYTES 2 (L) 12 - 49 %    MONOCYTES 7 5 - 13 %    EOSINOPHILS 0 0 - 7 %    BASOPHILS 0 0 - 1 %    IMMATURE GRANULOCYTES 1 (H) 0.0 - 0.5 %    ABS. NEUTROPHILS 7.0 1.8 - 8.0 K/UL    ABS. LYMPHOCYTES 0.2 (L) 0.8 - 3.5 K/UL    ABS. MONOCYTES 0.5 0.0 - 1.0 K/UL    ABS. EOSINOPHILS 0.0 0.0 - 0.4 K/UL    ABS. BASOPHILS 0.0 0.0 - 0.1 K/UL    ABS. IMM. GRANS. 0.1 (H) 0.00 - 0.04 K/UL    DF SMEAR SCANNED      RBC COMMENTS ANISOCYTOSIS  1+        RBC COMMENTS OVALOCYTES  PRESENT       HEPATIC FUNCTION PANEL    Collection Time: 12/23/21  9:49 AM   Result Value Ref Range    Protein, total 6.2 (L) 6.4 - 8.2 g/dL    Albumin 2.8 (L) 3.5 - 5.0 g/dL    Globulin 3.4 2.0 - 4.0 g/dL    A-G Ratio 0.8 (L) 1.1 - 2.2      Bilirubin, total 2.2 (H) 0.2 - 1.0 MG/DL    Bilirubin, direct 1.6 (H) 0.0 - 0.2 MG/DL    Alk. phosphatase 640 (H) 45 - 117 U/L    AST (SGOT) 137 (H) 15 - 37 U/L    ALT (SGPT) 258 (H) 12 - 78 U/L         Assessment / Plan :     Abdominal pain   Cholecystitis   Thrombocytopenia  Gallstone pancreatitis    73yo male withPt has a history of chronic thrombocytopenia (baseline plt 50-60k), 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 Presented to OSH with post prandial RUQ and epigastric pain that would come and go over the past 3 weeks. States the pain typically is relieved with TUMs but \"did not sit right. \" That night he had chills, rigors, nausea & 2 episodes of non bloody emesis reported fever of 103. LFTs at [facility] notable for T bili 5.1 --> 2.6.   Suspect stone has passed, mild associated gallstone pancreatitis, though as MRCP is contraindicated reasonable to start w EUS tomorrow to ensure duct is clear and can then proceed w CCK. If EUS is positive will move forward w ERCP. Trend LFTs, INR (will need to be less than 1.5). CT ABD/Pelvis 12/20/21 (OSH): revealed distended gallbladder, mild intrahepatic biliary dilation, no calcified stones. US ABD LTM (OSH): CBD dilated 11mm, gallbladder distention with some fluid. GB wall thickness 5mm suspicious for cholecystitis.     ERCP 12/22/21: biliary sphincterotomy, biliary stone removal   Labs (12/23/21)  TB 2.2 , ,   Lipase 1098    - diet per surgery   - Continue IVF  - trend TB/LFT's, lipase  - continue IV Zosyn  - timing for cholecystomy per surgery   - supportive care, antiemetics, pain medication per hospitalist  - optimized per cardiology from a cardiac standpoint        Patient Active Hospital Problem List:   Active Problems:    Cholecystitis (12/21/2021)        Leda Woo, INDER

## 2021-12-24 LAB
ATRIAL RATE: 74 BPM
BASOPHILS # BLD: 0 K/UL (ref 0–0.1)
BASOPHILS NFR BLD: 0 % (ref 0–1)
CALCULATED P AXIS, ECG09: 29 DEGREES
CALCULATED R AXIS, ECG10: 11 DEGREES
CALCULATED T AXIS, ECG11: -15 DEGREES
COMMENT, HOLDF: NORMAL
DIAGNOSIS, 93000: NORMAL
DIFFERENTIAL METHOD BLD: ABNORMAL
EOSINOPHIL # BLD: 0.1 K/UL (ref 0–0.4)
EOSINOPHIL NFR BLD: 2 % (ref 0–7)
ERYTHROCYTE [DISTWIDTH] IN BLOOD BY AUTOMATED COUNT: 18.1 % (ref 11.5–14.5)
GLUCOSE BLD STRIP.AUTO-MCNC: 112 MG/DL (ref 65–117)
GLUCOSE BLD STRIP.AUTO-MCNC: 166 MG/DL (ref 65–117)
GLUCOSE BLD STRIP.AUTO-MCNC: 97 MG/DL (ref 65–117)
GLUCOSE BLD STRIP.AUTO-MCNC: 98 MG/DL (ref 65–117)
HCT VFR BLD AUTO: 22.5 % (ref 36.6–50.3)
HGB BLD-MCNC: 7.5 G/DL (ref 12.1–17)
IMM GRANULOCYTES # BLD AUTO: 0 K/UL (ref 0–0.04)
IMM GRANULOCYTES NFR BLD AUTO: 1 % (ref 0–0.5)
LYMPHOCYTES # BLD: 0.3 K/UL (ref 0.8–3.5)
LYMPHOCYTES NFR BLD: 6 % (ref 12–49)
MCH RBC QN AUTO: 33 PG (ref 26–34)
MCHC RBC AUTO-ENTMCNC: 33.3 G/DL (ref 30–36.5)
MCV RBC AUTO: 99.1 FL (ref 80–99)
MONOCYTES # BLD: 0.5 K/UL (ref 0–1)
MONOCYTES NFR BLD: 11 % (ref 5–13)
NEUTS SEG # BLD: 3.3 K/UL (ref 1.8–8)
NEUTS SEG NFR BLD: 80 % (ref 32–75)
NRBC # BLD: 0 K/UL (ref 0–0.01)
NRBC BLD-RTO: 0 PER 100 WBC
P-R INTERVAL, ECG05: 206 MS
PLATELET # BLD AUTO: 62 K/UL (ref 150–400)
PMV BLD AUTO: 10.8 FL (ref 8.9–12.9)
Q-T INTERVAL, ECG07: 424 MS
QRS DURATION, ECG06: 126 MS
QTC CALCULATION (BEZET), ECG08: 470 MS
RBC # BLD AUTO: 2.27 M/UL (ref 4.1–5.7)
RBC MORPH BLD: ABNORMAL
SAMPLES BEING HELD,HOLD: NORMAL
SERVICE CMNT-IMP: ABNORMAL
SERVICE CMNT-IMP: NORMAL
VENTRICULAR RATE, ECG03: 74 BPM
WBC # BLD AUTO: 4.2 K/UL (ref 4.1–11.1)

## 2021-12-24 PROCEDURE — 85025 COMPLETE CBC W/AUTO DIFF WBC: CPT

## 2021-12-24 PROCEDURE — 94760 N-INVAS EAR/PLS OXIMETRY 1: CPT

## 2021-12-24 PROCEDURE — 82962 GLUCOSE BLOOD TEST: CPT

## 2021-12-24 PROCEDURE — 74011250637 HC RX REV CODE- 250/637: Performed by: INTERNAL MEDICINE

## 2021-12-24 PROCEDURE — 74011250637 HC RX REV CODE- 250/637: Performed by: STUDENT IN AN ORGANIZED HEALTH CARE EDUCATION/TRAINING PROGRAM

## 2021-12-24 PROCEDURE — 65270000032 HC RM SEMIPRIVATE

## 2021-12-24 PROCEDURE — 74011636637 HC RX REV CODE- 636/637: Performed by: INTERNAL MEDICINE

## 2021-12-24 PROCEDURE — 97116 GAIT TRAINING THERAPY: CPT

## 2021-12-24 PROCEDURE — 74011000258 HC RX REV CODE- 258: Performed by: SURGERY

## 2021-12-24 PROCEDURE — 36415 COLL VENOUS BLD VENIPUNCTURE: CPT

## 2021-12-24 PROCEDURE — 97161 PT EVAL LOW COMPLEX 20 MIN: CPT

## 2021-12-24 PROCEDURE — 74011250636 HC RX REV CODE- 250/636: Performed by: FAMILY MEDICINE

## 2021-12-24 PROCEDURE — 74011000258 HC RX REV CODE- 258: Performed by: FAMILY MEDICINE

## 2021-12-24 PROCEDURE — 99231 SBSQ HOSP IP/OBS SF/LOW 25: CPT | Performed by: SURGERY

## 2021-12-24 RX ORDER — OXYCODONE HYDROCHLORIDE 5 MG/1
10 TABLET ORAL
Status: DISCONTINUED | OUTPATIENT
Start: 2021-12-24 | End: 2021-12-28 | Stop reason: HOSPADM

## 2021-12-24 RX ORDER — ACETAMINOPHEN 500 MG
1000 TABLET ORAL
Status: DISCONTINUED | OUTPATIENT
Start: 2021-12-24 | End: 2021-12-28 | Stop reason: HOSPADM

## 2021-12-24 RX ORDER — OXYCODONE HYDROCHLORIDE 5 MG/1
5 TABLET ORAL
Status: DISCONTINUED | OUTPATIENT
Start: 2021-12-24 | End: 2021-12-28 | Stop reason: HOSPADM

## 2021-12-24 RX ADMIN — PIPERACILLIN SODIUM AND TAZOBACTAM SODIUM 3.38 G: 3; .375 INJECTION, POWDER, LYOPHILIZED, FOR SOLUTION INTRAVENOUS at 17:14

## 2021-12-24 RX ADMIN — LISINOPRIL 10 MG: 10 TABLET ORAL at 17:14

## 2021-12-24 RX ADMIN — GABAPENTIN 300 MG: 300 CAPSULE ORAL at 17:14

## 2021-12-24 RX ADMIN — GABAPENTIN 300 MG: 300 CAPSULE ORAL at 10:44

## 2021-12-24 RX ADMIN — PREDNISONE 2 MG: 1 TABLET ORAL at 10:44

## 2021-12-24 RX ADMIN — METOPROLOL SUCCINATE 25 MG: 25 TABLET, FILM COATED, EXTENDED RELEASE ORAL at 10:44

## 2021-12-24 RX ADMIN — DEXTROSE MONOHYDRATE AND SODIUM CHLORIDE 50 ML/HR: 5; .9 INJECTION, SOLUTION INTRAVENOUS at 22:53

## 2021-12-24 RX ADMIN — HYDROCHLOROTHIAZIDE 12.5 MG: 25 TABLET ORAL at 10:44

## 2021-12-24 RX ADMIN — HYDROXYCHLOROQUINE SULFATE 200 MG: 200 TABLET ORAL at 10:45

## 2021-12-24 RX ADMIN — HYDROXYCHLOROQUINE SULFATE 200 MG: 200 TABLET ORAL at 17:14

## 2021-12-24 RX ADMIN — Medication 10 ML: at 14:00

## 2021-12-24 RX ADMIN — PIPERACILLIN SODIUM AND TAZOBACTAM SODIUM 3.38 G: 3; .375 INJECTION, POWDER, LYOPHILIZED, FOR SOLUTION INTRAVENOUS at 08:00

## 2021-12-24 RX ADMIN — ATORVASTATIN CALCIUM 40 MG: 40 TABLET, FILM COATED ORAL at 17:14

## 2021-12-24 NOTE — PROGRESS NOTES
Bedside and Verbal shift change report given to [facility] (oncoming nurse) by Don Estrada (offgoing nurse). Report included the following information SBAR, Kardex, Intake/Output, MAR and Recent Results.

## 2021-12-24 NOTE — PROGRESS NOTES
Problem: Mobility Impaired (Adult and Pediatric)  Goal: *Acute Goals and Plan of Care (Insert Text)  Description: FUNCTIONAL STATUS PRIOR TO ADMISSION: Patient was independent and active without use of DME.      HOME SUPPORT PRIOR TO ADMISSION: The patient lived with wife but did not require assist.    Physical Therapy Goals  Initiated 12/24/2021  1. Patient will move from supine to sit and sit to supine , scoot up and down, and roll side to side in bed with independence within 7 day(s). 2.  Patient will transfer from bed to chair and chair to bed with modified independence using the least restrictive device within 7 day(s). 3.  Patient will perform sit to stand with modified independence within 7 day(s). 4.  Patient will ambulate with modified independence for 300 feet with the least restrictive device within 7 day(s). 5.  Patient will ascend/descend 4 stairs with one handrail(s) with supervision/set-up within 7 day(s). PHYSICAL THERAPY EVALUATION  Patient: Denilson Davis (77 y.o. male)  Date: 12/24/2021  Primary Diagnosis: Cholecystitis [K81.9]  Procedure(s) (LRB):  ENDOSCOPIC RETROGRADE CHOLANGIOPANCREATOGRAPHY (ERCP) (N/A)  ENDOSCOPIC STONE EXTRACTION/BALLOON SWEEP (N/A)  BILIARY DILATATION (N/A) 2 Days Post-Op   Precautions:   Fall    ASSESSMENT  Based on the objective data described below, the patient presents POD# 2 ERCP, endoscopic stone extraction/balloon sweep and biliary dilatation - pt reporting generalized weakness associated with hospitalization and decreased nutrition - pt awaiting normal labs to clear for cholecystectomy - possibly 12/26. Pt is doing very well - up in chair - however - unsteady on feet without support. At baseline pt ambulatory without assistive device. Pt issued RW for use in hospital and instructed to stay close to RW and cleared to walk with family 3 x day in hallway to increase strength and activity endurance prior to planned surgery.   Will follow and eval post-op and progress to stairs. Patient may benefit from home PT at discharge. Current Level of Function Impacting Discharge (mobility/balance): Supervision for amb with RW    Functional Outcome Measure: The patient scored 18/26 on the Tinetti outcome measure which is indicative of high fall risk. Other factors to consider for discharge: supportive family     Patient will benefit from skilled therapy intervention to address the above noted impairments. PLAN :  Recommendations and Planned Interventions: bed mobility training, transfer training, gait training, therapeutic exercises, patient and family training/education, and therapeutic activities      Frequency/Duration: Patient will be followed by physical therapy:  5 times a week to address goals. Recommendation for discharge: (in order for the patient to meet his/her long term goals)  Pending progress after planned surgery - Physical therapy at least 2 days/week in the home     This discharge recommendation:  Has not yet been discussed the attending provider and/or case management    IF patient discharges home will need the following DME: patient owns DME required for discharge         SUBJECTIVE:   Patient stated i'm just getting weak sitting around and not eating.     OBJECTIVE DATA SUMMARY:   HISTORY:    Past Medical History:   Diagnosis Date    Arthritis     CAD (coronary artery disease) 1998    5 stents    Cancer ([address] 2020    squamous cell, basal cell, melanoma    Coagulation disorder (HCC)     blood clotting d/o from taking naproxen    Hypertension     Lupus (HCC)     PAD (peripheral artery disease) ([address]     stents: aorta & lower extremities    Thromboembolus ([address]      Past Surgical History:   Procedure Laterality Date    HX CATARACT REMOVAL Bilateral 2010    HX OTHER SURGICAL      multiple spots of skin cancer removal    [address]    stents placed    VASCULAR SURGERY PROCEDURE UNLIST 2017    IVC filter       Personal factors and/or comorbidities impacting plan of care: as above    Home Situation  Home Environment: Private residence  # Steps to Enter: 4  Rails to Enter: Yes (rail on backdoor )  One/Two Story Residence: One story  Living Alone: No  Support Systems: Spouse/Significant Other  Patient Expects to be Discharged to[de-identified] [employer]  Current DME Used/Available at Home: Shower chair,Walker, rolling,Cane, straight  Tub or Shower Type: Shower    EXAMINATION/PRESENTATION/DECISION MAKING:   Critical Behavior:  Neurologic State: Alert  Orientation Level: Oriented X4  Cognition: Appropriate decision making,Appropriate safety awareness  Safety/Judgement: Awareness of environment  Hearing: Auditory  Auditory Impairment: Hard of hearing, bilateral  Range Of Motion:  AROM: Generally decreased, functional                       Strength:    Strength: Generally decreased, functional                    Tone & Sensation:   Tone: Normal              Sensation: Intact               Coordination:  Coordination: Within functional limits  Functional Mobility:  Bed Mobility:   Not tested - pt up in chair and returned to chair           Transfers:  Sit to Stand: Supervision  Stand to Sit: Supervision        Bed to Chair: Supervision              Balance:   Sitting: Intact; Without support  Standing: Impaired; Without support  Standing - Static: Good;Constant support (RW)  Standing - Dynamic : Good;Constant support (RW)  Ambulation/Gait Training:  Distance (ft): 300 Feet (ft)  Assistive Device: Walker, rolling;Gait belt  Ambulation - Level of Assistance: Supervision; Adaptive equipment (verbal cues to stay close to RW)        Gait Abnormalities: Decreased step clearance (flexed forward at hips, tends to push RW in front of trunk)        Base of Support: Widened     Speed/Felicitas: Accelerated (with cues able to slow pace to normal)              Therapeutic Activity:   Pt encouraged to increase time up in chair and amb with family with RW 3 x day in hallway. Functional Measure:  Tinetti test:    Sitting Balance: 1  Arises: 1  Attempts to Rise: 2  Immediate Standing Balance: 2  Standing Balance: 1  Nudged: 1  Eyes Closed: 0  Turn 360 Degrees - Continuous/Discontinuous: 0  Turn 360 Degrees - Steady/Unsteady: 1  Sitting Down: 1  Balance Score: 10 Balance total score  Indication of Gait: 1  R Step Length/Height: 1  L Step Length/Height: 1  R Foot Clearance: 1  L Foot Clearance: 1  Step Symmetry: 1  Step Continuity: 1  Path: 1  Trunk: 0  Walking Time: 0  Gait Score: 8 Gait total score  Total Score: 18/28 Overall total score         Tinetti Tool Score Risk of Falls  <19 = High Fall Risk  19-24 = Moderate Fall Risk  25-28 = Low Fall Risk  Tinetti ME. Performance-Oriented Assessment of Mobility Problems in Elderly Patients. [facility] 66; [id number]. (Scoring Description: PT Bulletin Feb. 10, 1993)    Older adults: Rudolph Kruger et al, 2009; n = 1000 [facility] elderly evaluated with ABC, TIM, ADL, and IADL)  · Mean TIM score for males aged 69-68 years = 26.21(3.40)  · Mean TIM score for females age 69-68 years = 25.16(4.30)  · Mean TIM score for males over 80 years = 23.29(6.02)  · Mean TIM score for females over 80 years = 17.20(8.32)           Physical Therapy Evaluation Charge Determination   History Examination Presentation Decision-Making   LOW Complexity : Zero comorbidities / personal factors that will impact the outcome / POC LOW Complexity : 1-2 Standardized tests and measures addressing body structure, function, activity limitation and / or participation in recreation  LOW Complexity : Stable, uncomplicated  LOW Complexity : FOTO score of       Based on the above components, the patient evaluation is determined to be of the following complexity level: LOW     Pain Rating:  Pt denied pain.     Activity Tolerance:   Good considering in hospital last 4 days    After treatment patient left in no apparent distress:   Sitting in chair, Call bell within reach, and Caregiver / family present    COMMUNICATION/EDUCATION:   The patients plan of care was discussed with: Registered nurse. Fall prevention education was provided and the patient/caregiver indicated understanding., Patient/family have participated as able in goal setting and plan of care. , and Patient/family agree to work toward stated goals and plan of care.     Thank you for this referral.  Lizzie Jeronimo, PT   Time Calculation: 25 mins

## 2021-12-24 NOTE — PROGRESS NOTES
Mr. Katie Garcia has no c/o today. Tm 99.4 HR: 67 BP: 141/70 Resp Rate: 18 98% sat on room air. Intake/Output Summary (Last 24 hours) at 12/24/2021 0920  Last data filed at 12/24/2021 0749  Gross per 24 hour   Intake    Output 1400 ml   Net -1400 ml   Exam: Cor: RRR. Lungs: Bilateral breath sounds. Clear to auscultation. Abd: Soft. Non tender. No guarding or rebound. Labs:   Recent Results (from the past 12 hour(s))   CBC WITH AUTOMATED DIFF    Collection Time: 12/24/21  4:40 AM   Result Value Ref Range    WBC 4.2 4.1 - 11.1 K/uL    RBC 2.27 (L) 4.10 - 5.70 M/uL    HGB 7.5 (L) 12.1 - 17.0 g/dL    HCT 22.5 (L) 36.6 - 50.3 %    MCV 99.1 (H) 80.0 - 99.0 FL    MCH 33.0 26.0 - 34.0 PG    MCHC 33.3 30.0 - 36.5 g/dL    RDW 18.1 (H) 11.5 - 14.5 %    PLATELET 62 (L) 925 - 400 K/uL    MPV 10.8 8.9 - 12.9 FL    NRBC 0.0 0  WBC    ABSOLUTE NRBC 0.00 0.00 - 0.01 K/uL    NEUTROPHILS 80 (H) 32 - 75 %    LYMPHOCYTES 6 (L) 12 - 49 %    MONOCYTES 11 5 - 13 %    EOSINOPHILS 2 0 - 7 %    BASOPHILS 0 0 - 1 %    IMMATURE GRANULOCYTES 1 (H) 0.0 - 0.5 %    ABS. NEUTROPHILS 3.3 1.8 - 8.0 K/UL    ABS. LYMPHOCYTES 0.3 (L) 0.8 - 3.5 K/UL    ABS. MONOCYTES 0.5 0.0 - 1.0 K/UL    ABS. EOSINOPHILS 0.1 0.0 - 0.4 K/UL    ABS. BASOPHILS 0.0 0.0 - 0.1 K/UL    ABS. IMM. GRANS. 0.0 0.00 - 0.04 K/UL    DF SMEAR SCANNED      RBC COMMENTS ANISOCYTOSIS  1+        RBC COMMENTS MACROCYTOSIS  1+        RBC COMMENTS OVALOCYTES  1+        RBC COMMENTS SCHISTOCYTES  1+       SAMPLES BEING HELD    Collection Time: 12/24/21  4:40 AM   Result Value Ref Range    SAMPLES BEING HELD 1PST     COMMENT        Add-on orders for these samples will be processed based on acceptable specimen integrity and analyte stability, which may vary by analyte.    GLUCOSE, POC    Collection Time: 12/24/21  6:07 AM   Result Value Ref Range    Glucose (POC) 112 65 - 117 mg/dL    Performed by Mekhi Sheppard    Mr. Katie Garcia is doing well following ERCP and clearance of CBD stones. Hypoglycemia improved. Clear liquid diet as tolerated. Continue IVF at 50 ml/hour. Anti-emetics and pain medication as needed. OOB, Ambulate. Labs in AM.  Plans per Dr. Jovanni Medina.

## 2021-12-24 NOTE — PROGRESS NOTES
[address] [facility] Adult  Hospitalist Group                                                                                          Hospitalist Progress Note  Melany Sanford MD  Answering service: [phone] from in house phone        Date of Service:  2021  NAME:  Oscar Cano. :  1940  MRN:  [id number]      Admission Summary:     Patient presents with abdominal pain and admitted for acute cholecystitis. Patient also with gallstone pancreatitis. Patient evaluated by GI, s/p ERCP  with biliary sphincterotomy and biliary stone removal.  Abdominal pain is improving and lipase levels improving. Plan for general surgery to do cholecystectomy but significant hypoglycemia this a.m. but patient asymptomatic. Interval history / Subjective:       Patient denies any abdominal pain this AM.  Denies nausea vomiting.      Assessment & Plan:     Gallstone pancreatitis  -GI evaluated the patient, s/p ERCP  with biliary sphincterotomy and biliary stone removal  -Overall improving clinically, lipase also improving    Acute cholecystitis  -Patient evaluated by general surgery  -Patient still with elevated bilirubin likely due to swelling with a sphincterotomy and dilatation was done  -Timing for lap trevor per general surgery  -On empiric antibiotic with Zosyn    Hypoglycemia  -Patient with prolonged fasting, also with acute pancreatic insufficiency, probable culprit for hypoglycemia  -Doubt that sepsis or adrenal insufficiency is playing a role, blood cultures so far negative  -Continue D5, patient starting clear liquids, improving blood sugars    Elevated troponin due to demand ischemia  -Cardiology previously evaluated patient  -Echo shows EF of 45 to 50%, wall motion abnormalities consistent with prior coronary artery disease history  -Patient is well optimized from cardiac standpoint for surgery    Hypertension  -Continue hydrochlorothiazide, lisinopril, metoprolol    SLE  -Patient has been off of prednisone but restarted on 12/22  -Otherwise stable    Thrombocytopenia  -Looks chronic    Dyslipidemia  -Continue Lipitor    Code status: Full  DVT prophylaxis: SCDs    Care Plan discussed with: Patient/Family  Anticipated Disposition: Home w/Family  Anticipated Discharge: 24 hours to 48 hours     Hospital Problems  Date Reviewed: 9/3/2013          Codes Class Noted POA    Cholecystitis ICD-10-CM: K81.9  ICD-9-CM: 575.10  12/21/2021 Unknown                Review of Systems:   A comprehensive review of systems was negative except for that written in the HPI. Vital Signs:    Last 24hrs VS reviewed since prior progress note. Most recent are:  Visit Vitals  BP (!) 141/70   Pulse 67   Temp 99.4 °F (37.4 °C)   Resp 18   Ht 5' 7\" (1.702 m)   Wt 66.6 kg (146 lb 14.4 oz)   SpO2 98%   BMI 23.01 kg/m²         Intake/Output Summary (Last 24 hours) at 12/24/2021 1351  Last data filed at 12/24/2021 0749  Gross per 24 hour   Intake    Output 1400 ml   Net -1400 ml        Physical Examination:     I had a face to face encounter with this patient and independently examined them on 12/24/2021 as outlined below:          Constitutional:  No acute distress, cooperative, pleasant    ENT:  Oral mucosa moist, oropharynx benign. Resp:  CTA bilaterally. No wheezing/rhonchi/rales. No accessory muscle use   CV:  Regular rhythm, normal rate, no murmurs, gallops, rubs    GI:  Soft, non distended, non tender. normoactive bowel sounds, no hepatosplenomegaly     Musculoskeletal:  No edema, warm, 2+ pulses throughout    Neurologic:  Moves all extremities.   AAOx3, CN II-XII reviewed            Data Review:    Review and/or order of clinical lab test      Labs:     Recent Labs     12/24/21  0440 12/23/21  0949   WBC 4.2 7.8   HGB 7.5* 9.1*   HCT 22.5* 27.6*   PLT 62* 75*     Recent Labs     12/23/21  0638 12/22/21  0143   * 135*   K 4.2 3.7    107   CO2 17* 22   BUN 13 15   CREA 0.58* 0.81   GLU 29* 92   CA 8.2* 8.2*     Recent Labs     12/23/21  0949 12/23/21  0638 12/22/21  0143   *  --  304*   *  --  561*   TBILI 2.2*  --  3.3*   TP 6.2*  --  5.3*   ALB 2.8*  --  2.4*   GLOB 3.4  --  2.9   LPSE  --  1,098* >3,000*     Recent Labs     12/22/21  0143   INR 1.3*   PTP 13.5*      No results for input(s): FE, TIBC, PSAT, FERR in the last 72 hours. No results found for: FOL, RBCF   No results for input(s): PH, PCO2, PO2 in the last 72 hours. No results for input(s): CPK, CKNDX, TROIQ in the last 72 hours.     No lab exists for component: CPKMB  Lab Results   Component Value Date/Time    Cholesterol, total 181 08/23/2012 09:06 AM    HDL Cholesterol 58 08/23/2012 09:06 AM    LDL, calculated 103 (H) 08/23/2012 09:06 AM    Triglyceride 98 08/23/2012 09:06 AM     Lab Results   Component Value Date/Time    Glucose (POC) 97 12/24/2021 12:06 PM    Glucose (POC) 112 12/24/2021 06:07 AM    Glucose (POC) 75 12/23/2021 09:15 PM    Glucose (POC) 102 12/23/2021 09:31 AM    Glucose (POC) 50 (LL) 12/23/2021 08:38 AM     No results found for: COLOR, APPRN, SPGRU, REFSG, MITZI, PROTU, GLUCU, KETU, BILU, UROU, MARCELINO, LEUKU, GLUKE, EPSU, BACTU, WBCU, RBCU, CASTS, UCRY      Medications Reviewed:     Current Facility-Administered Medications   Medication Dose Route Frequency    acetaminophen (TYLENOL) tablet 1,000 mg  1,000 mg Oral Q6H PRN    oxyCODONE IR (ROXICODONE) tablet 5 mg  5 mg Oral Q4H PRN    oxyCODONE IR (ROXICODONE) tablet 10 mg  10 mg Oral Q4H PRN    dextrose 5% and 0.9% NaCl infusion  50 mL/hr IntraVENous CONTINUOUS    sodium chloride (NS) flush 5-40 mL  5-40 mL IntraVENous Q8H    sodium chloride (NS) flush 5-40 mL  5-40 mL IntraVENous PRN    atorvastatin (LIPITOR) tablet 40 mg  40 mg Oral QPM    hydroCHLOROthiazide (HYDRODIURIL) tablet 12.5 mg  12.5 mg Oral DAILY    lisinopriL (PRINIVIL, ZESTRIL) tablet 10 mg  10 mg Oral QPM    morphine injection 2 mg  2 mg IntraVENous Q4H PRN    hydrALAZINE (APRESOLINE) 20 mg/mL injection 10 mg  10 mg IntraVENous Q6H PRN    sodium chloride (NS) flush 5-40 mL  5-40 mL IntraVENous Q8H    sodium chloride (NS) flush 5-40 mL  5-40 mL IntraVENous PRN    piperacillin-tazobactam (ZOSYN) 3.375 g in 0.9% sodium chloride (MBP/ADV) 100 mL MBP  3.375 g IntraVENous Q8H    gabapentin (NEURONTIN) capsule 300 mg  300 mg Oral BID    hydrOXYchloroQUINE (PLAQUENIL) tablet 200 mg  200 mg Oral BID    predniSONE (DELTASONE) tablet 2 mg  2 mg Oral DAILY WITH BREAKFAST    [Held by provider] aspirin delayed-release tablet 81 mg  81 mg Oral DAILY    metoprolol succinate (TOPROL-XL) XL tablet 25 mg  25 mg Oral DAILY     ______________________________________________________________________  EXPECTED LENGTH OF STAY: 4d 9h  ACTUAL LENGTH OF STAY:          4                 Lluvia Barrera MD

## 2021-12-25 LAB
ALBUMIN SERPL-MCNC: 2.2 G/DL (ref 3.5–5)
ALBUMIN/GLOB SERPL: 0.8 {RATIO} (ref 1.1–2.2)
ALP SERPL-CCNC: 398 U/L (ref 45–117)
ALT SERPL-CCNC: 151 U/L (ref 12–78)
ANION GAP SERPL CALC-SCNC: 7 MMOL/L (ref 5–15)
AST SERPL-CCNC: 67 U/L (ref 15–37)
BASOPHILS # BLD: 0 K/UL (ref 0–0.1)
BASOPHILS NFR BLD: 0 % (ref 0–1)
BILIRUB SERPL-MCNC: 1.2 MG/DL (ref 0.2–1)
BUN SERPL-MCNC: 7 MG/DL (ref 6–20)
BUN/CREAT SERPL: 10 (ref 12–20)
CALCIUM SERPL-MCNC: 8.4 MG/DL (ref 8.5–10.1)
CHLORIDE SERPL-SCNC: 106 MMOL/L (ref 97–108)
CO2 SERPL-SCNC: 23 MMOL/L (ref 21–32)
CREAT SERPL-MCNC: 0.71 MG/DL (ref 0.7–1.3)
DIFFERENTIAL METHOD BLD: ABNORMAL
EOSINOPHIL # BLD: 0.1 K/UL (ref 0–0.4)
EOSINOPHIL NFR BLD: 3 % (ref 0–7)
ERYTHROCYTE [DISTWIDTH] IN BLOOD BY AUTOMATED COUNT: 18.1 % (ref 11.5–14.5)
GLOBULIN SER CALC-MCNC: 2.8 G/DL (ref 2–4)
GLUCOSE BLD STRIP.AUTO-MCNC: 72 MG/DL (ref 65–117)
GLUCOSE BLD STRIP.AUTO-MCNC: 93 MG/DL (ref 65–117)
GLUCOSE BLD STRIP.AUTO-MCNC: 96 MG/DL (ref 65–117)
GLUCOSE SERPL-MCNC: 78 MG/DL (ref 65–100)
HCT VFR BLD AUTO: 20.8 % (ref 36.6–50.3)
HGB BLD-MCNC: 7.1 G/DL (ref 12.1–17)
HISTORY CHECKED?,CKHIST: NORMAL
IMM GRANULOCYTES # BLD AUTO: 0 K/UL (ref 0–0.04)
IMM GRANULOCYTES NFR BLD AUTO: 1 % (ref 0–0.5)
LIPASE SERPL-CCNC: 301 U/L (ref 73–393)
LYMPHOCYTES # BLD: 0.3 K/UL (ref 0.8–3.5)
LYMPHOCYTES NFR BLD: 10 % (ref 12–49)
MCH RBC QN AUTO: 33.6 PG (ref 26–34)
MCHC RBC AUTO-ENTMCNC: 34.1 G/DL (ref 30–36.5)
MCV RBC AUTO: 98.6 FL (ref 80–99)
MONOCYTES # BLD: 0.4 K/UL (ref 0–1)
MONOCYTES NFR BLD: 17 % (ref 5–13)
NEUTS SEG # BLD: 1.7 K/UL (ref 1.8–8)
NEUTS SEG NFR BLD: 69 % (ref 32–75)
NRBC # BLD: 0 K/UL (ref 0–0.01)
NRBC BLD-RTO: 0 PER 100 WBC
PLATELET # BLD AUTO: 56 K/UL (ref 150–400)
PMV BLD AUTO: 11.6 FL (ref 8.9–12.9)
POTASSIUM SERPL-SCNC: 3.3 MMOL/L (ref 3.5–5.1)
PROT SERPL-MCNC: 5 G/DL (ref 6.4–8.2)
RBC # BLD AUTO: 2.11 M/UL (ref 4.1–5.7)
RBC MORPH BLD: ABNORMAL
SERVICE CMNT-IMP: NORMAL
SODIUM SERPL-SCNC: 136 MMOL/L (ref 136–145)
WBC # BLD AUTO: 2.5 K/UL (ref 4.1–11.1)

## 2021-12-25 PROCEDURE — 80053 COMPREHEN METABOLIC PANEL: CPT

## 2021-12-25 PROCEDURE — 83690 ASSAY OF LIPASE: CPT

## 2021-12-25 PROCEDURE — 74011250637 HC RX REV CODE- 250/637: Performed by: INTERNAL MEDICINE

## 2021-12-25 PROCEDURE — 74011250636 HC RX REV CODE- 250/636: Performed by: FAMILY MEDICINE

## 2021-12-25 PROCEDURE — 36430 TRANSFUSION BLD/BLD COMPNT: CPT

## 2021-12-25 PROCEDURE — 99231 SBSQ HOSP IP/OBS SF/LOW 25: CPT | Performed by: SURGERY

## 2021-12-25 PROCEDURE — P9016 RBC LEUKOCYTES REDUCED: HCPCS

## 2021-12-25 PROCEDURE — 82962 GLUCOSE BLOOD TEST: CPT

## 2021-12-25 PROCEDURE — 86923 COMPATIBILITY TEST ELECTRIC: CPT

## 2021-12-25 PROCEDURE — 86901 BLOOD TYPING SEROLOGIC RH(D): CPT

## 2021-12-25 PROCEDURE — 74011250637 HC RX REV CODE- 250/637: Performed by: STUDENT IN AN ORGANIZED HEALTH CARE EDUCATION/TRAINING PROGRAM

## 2021-12-25 PROCEDURE — 85025 COMPLETE CBC W/AUTO DIFF WBC: CPT

## 2021-12-25 PROCEDURE — 94760 N-INVAS EAR/PLS OXIMETRY 1: CPT

## 2021-12-25 PROCEDURE — 74011000258 HC RX REV CODE- 258: Performed by: FAMILY MEDICINE

## 2021-12-25 PROCEDURE — 36415 COLL VENOUS BLD VENIPUNCTURE: CPT

## 2021-12-25 PROCEDURE — 65270000032 HC RM SEMIPRIVATE

## 2021-12-25 PROCEDURE — 74011636637 HC RX REV CODE- 636/637: Performed by: INTERNAL MEDICINE

## 2021-12-25 RX ORDER — SODIUM CHLORIDE 9 MG/ML
250 INJECTION, SOLUTION INTRAVENOUS AS NEEDED
Status: DISCONTINUED | OUTPATIENT
Start: 2021-12-26 | End: 2021-12-28 | Stop reason: HOSPADM

## 2021-12-25 RX ORDER — SODIUM CHLORIDE 9 MG/ML
250 INJECTION, SOLUTION INTRAVENOUS AS NEEDED
Status: DISCONTINUED | OUTPATIENT
Start: 2021-12-25 | End: 2021-12-28 | Stop reason: HOSPADM

## 2021-12-25 RX ADMIN — ATORVASTATIN CALCIUM 40 MG: 40 TABLET, FILM COATED ORAL at 17:42

## 2021-12-25 RX ADMIN — METOPROLOL SUCCINATE 25 MG: 25 TABLET, FILM COATED, EXTENDED RELEASE ORAL at 09:22

## 2021-12-25 RX ADMIN — PREDNISONE 2 MG: 1 TABLET ORAL at 07:16

## 2021-12-25 RX ADMIN — HYDROCHLOROTHIAZIDE 12.5 MG: 25 TABLET ORAL at 09:22

## 2021-12-25 RX ADMIN — PIPERACILLIN SODIUM AND TAZOBACTAM SODIUM 3.38 G: 3; .375 INJECTION, POWDER, LYOPHILIZED, FOR SOLUTION INTRAVENOUS at 16:02

## 2021-12-25 RX ADMIN — GABAPENTIN 300 MG: 300 CAPSULE ORAL at 09:23

## 2021-12-25 RX ADMIN — LISINOPRIL 10 MG: 10 TABLET ORAL at 17:42

## 2021-12-25 RX ADMIN — HYDROXYCHLOROQUINE SULFATE 200 MG: 200 TABLET ORAL at 09:23

## 2021-12-25 RX ADMIN — PIPERACILLIN SODIUM AND TAZOBACTAM SODIUM 3.38 G: 3; .375 INJECTION, POWDER, LYOPHILIZED, FOR SOLUTION INTRAVENOUS at 00:10

## 2021-12-25 RX ADMIN — PIPERACILLIN SODIUM AND TAZOBACTAM SODIUM 3.38 G: 3; .375 INJECTION, POWDER, LYOPHILIZED, FOR SOLUTION INTRAVENOUS at 07:16

## 2021-12-25 RX ADMIN — HYDROXYCHLOROQUINE SULFATE 200 MG: 200 TABLET ORAL at 17:42

## 2021-12-25 RX ADMIN — Medication 10 ML: at 22:00

## 2021-12-25 RX ADMIN — GABAPENTIN 300 MG: 300 CAPSULE ORAL at 17:42

## 2021-12-25 NOTE — PROGRESS NOTES
[facility] [facility] Adult  Hospitalist Group                                                                                          Hospitalist Progress Note  Gale Viera MD  Answering service: [phone] from in house phone        Date of Service:  2021  NAME:  Ирина Jj. :  1940  MRN:  [id number]      Admission Summary:     Patient presents with abdominal pain and admitted for acute cholecystitis. Patient also with gallstone pancreatitis. Patient evaluated by GI, s/p ERCP  with biliary sphincterotomy and biliary stone removal.  Abdominal pain is improving and lipase levels improving. Plan for general surgery to do cholecystectomy but significant hypoglycemia this a.m. but patient asymptomatic.     Interval history / Subjective:       Overall doing well, no abdominal pain, tolerating diet     Assessment & Plan:     Gallstone pancreatitis  -GI evaluated the patient, s/p ERCP  with biliary sphincterotomy and biliary stone removal  -Overall improving clinically, lipase also improving    Acute cholecystitis  -Patient evaluated by general surgery  -Patient still with elevated bilirubin likely due to swelling with a sphincterotomy and dilatation was done  -On empiric antibiotic with Zosyn  -Plan for lap trevor tomorrow    Hypoglycemia  -Patient with prolonged fasting, also with acute pancreatic insufficiency, probable culprit for hypoglycemia  -Doubt that sepsis or adrenal insufficiency is playing a role, blood cultures so far negative  -Overall improving    Elevated troponin due to demand ischemia  -Cardiology previously evaluated patient  -Echo shows EF of 45 to 50%, wall motion abnormalities consistent with prior coronary artery disease history  -Patient is well optimized from cardiac standpoint for surgery    Hypertension  -Continue hydrochlorothiazide, lisinopril, metoprolol    SLE  -Patient has been off of prednisone but restarted on   -Otherwise stable    Thrombocytopenia  -Looks chronic    Dyslipidemia  -Continue Lipitor    Code status: Full  DVT prophylaxis: SCDs    Care Plan discussed with: Patient/Family  Anticipated Disposition: Home w/Family  Anticipated Discharge: 24 hours to 48 hours     Hospital Problems  Date Reviewed: 9/3/2013          Codes Class Noted POA    Cholecystitis ICD-10-CM: K81.9  ICD-9-CM: 575.10  12/21/2021 Unknown                Review of Systems:   A comprehensive review of systems was negative except for that written in the HPI. Vital Signs:    Last 24hrs VS reviewed since prior progress note. Most recent are:  Visit Vitals  /68   Pulse (!) 59   Temp 99 °F (37.2 °C)   Resp 16   Ht 5' 7\" (1.702 m)   Wt 66.5 kg (146 lb 9.6 oz)   SpO2 94%   BMI 22.96 kg/m²         Intake/Output Summary (Last 24 hours) at 12/25/2021 1234  Last data filed at 12/25/2021 1015  Gross per 24 hour   Intake 350 ml   Output 700 ml   Net -350 ml        Physical Examination:     I had a face to face encounter with this patient and independently examined them on 12/25/2021 as outlined below:          Constitutional:  No acute distress, cooperative, pleasant    ENT:  Oral mucosa moist, oropharynx benign. Resp:  CTA bilaterally. No wheezing/rhonchi/rales. No accessory muscle use   CV:  Regular rhythm, normal rate, no murmurs, gallops, rubs    GI:  Soft, non distended, non tender. normoactive bowel sounds, no hepatosplenomegaly     Musculoskeletal:  No edema, warm, 2+ pulses throughout    Neurologic:  Moves all extremities.   AAOx3, CN II-XII reviewed            Data Review:    Review and/or order of clinical lab test      Labs:     Recent Labs     12/25/21  0404 12/24/21  0440   WBC 2.5* 4.2   HGB 7.1* 7.5*   HCT 20.8* 22.5*   PLT 56* 62*     Recent Labs     12/25/21  0404 12/23/21  0638    134*   K 3.3* 4.2    108   CO2 23 17*   BUN 7 13   CREA 0.71 0.58*   GLU 78 29*   CA 8.4* 8.2*     Recent Labs     12/25/21  0404 12/23/21  2735 12/23/21  0638   * 258*  --    * 640*  --    TBILI 1.2* 2.2*  --    TP 5.0* 6.2*  --    ALB 2.2* 2.8*  --    GLOB 2.8 3.4  --    LPSE 301  --  1,098*     No results for input(s): INR, PTP, APTT, INREXT, INREXT in the last 72 hours. No results for input(s): FE, TIBC, PSAT, FERR in the last 72 hours. No results found for: FOL, RBCF   No results for input(s): PH, PCO2, PO2 in the last 72 hours. No results for input(s): CPK, CKNDX, TROIQ in the last 72 hours.     No lab exists for component: CPKMB  Lab Results   Component Value Date/Time    Cholesterol, total 181 08/23/2012 09:06 AM    HDL Cholesterol 58 08/23/2012 09:06 AM    LDL, calculated 103 (H) 08/23/2012 09:06 AM    Triglyceride 98 08/23/2012 09:06 AM     Lab Results   Component Value Date/Time    Glucose (POC) 96 12/25/2021 11:27 AM    Glucose (POC) 93 12/25/2021 06:16 AM    Glucose (POC) 98 12/24/2021 11:39 PM    Glucose (POC) 166 (H) 12/24/2021 06:04 PM    Glucose (POC) 97 12/24/2021 12:06 PM     No results found for: COLOR, APPRN, SPGRU, REFSG, MITZI, PROTU, GLUCU, KETU, BILU, UROU, MARCELINO, LEUKU, GLUKE, EPSU, BACTU, WBCU, RBCU, CASTS, UCRY      Medications Reviewed:     Current Facility-Administered Medications   Medication Dose Route Frequency    [START ON 12/26/2021] 0.9% sodium chloride infusion 250 mL  250 mL IntraVENous PRN    0.9% sodium chloride infusion 250 mL  250 mL IntraVENous PRN    acetaminophen (TYLENOL) tablet 1,000 mg  1,000 mg Oral Q6H PRN    oxyCODONE IR (ROXICODONE) tablet 5 mg  5 mg Oral Q4H PRN    oxyCODONE IR (ROXICODONE) tablet 10 mg  10 mg Oral Q4H PRN    dextrose 5% and 0.9% NaCl infusion  50 mL/hr IntraVENous CONTINUOUS    sodium chloride (NS) flush 5-40 mL  5-40 mL IntraVENous Q8H    sodium chloride (NS) flush 5-40 mL  5-40 mL IntraVENous PRN    atorvastatin (LIPITOR) tablet 40 mg  40 mg Oral QPM    hydroCHLOROthiazide (HYDRODIURIL) tablet 12.5 mg  12.5 mg Oral DAILY    lisinopriL (PRINIVIL, ZESTRIL) tablet 10 mg  10 mg Oral QPM    morphine injection 2 mg  2 mg IntraVENous Q4H PRN    hydrALAZINE (APRESOLINE) 20 mg/mL injection 10 mg  10 mg IntraVENous Q6H PRN    sodium chloride (NS) flush 5-40 mL  5-40 mL IntraVENous Q8H    sodium chloride (NS) flush 5-40 mL  5-40 mL IntraVENous PRN    piperacillin-tazobactam (ZOSYN) 3.375 g in 0.9% sodium chloride (MBP/ADV) 100 mL MBP  3.375 g IntraVENous Q8H    gabapentin (NEURONTIN) capsule 300 mg  300 mg Oral BID    hydrOXYchloroQUINE (PLAQUENIL) tablet 200 mg  200 mg Oral BID    predniSONE (DELTASONE) tablet 2 mg  2 mg Oral DAILY WITH BREAKFAST    [Held by provider] aspirin delayed-release tablet 81 mg  81 mg Oral DAILY    metoprolol succinate (TOPROL-XL) XL tablet 25 mg  25 mg Oral DAILY     ______________________________________________________________________  EXPECTED LENGTH OF STAY: 4d 9h  ACTUAL LENGTH OF STAY:          5                 Nika Clarke MD

## 2021-12-25 NOTE — PROGRESS NOTES
No c/o pain noted. Pt sitting on side of bed this am with wife and daughter at bedside. Seen by PT and ambulated in halls using walker. Steady gait noted. Up to chair this evening. Stating he is waiting for a decision to have his gallbladder removed and hoped it would be soon. Bedside shift change report given to RichieDorsey [address] [place] [address] (oncoming nurse) by Abdulaziz Dawkins RN (offgoing nurse). Report included the following information SBAR, Kardex, Intake/Output and MAR.

## 2021-12-25 NOTE — PROGRESS NOTES
I plan to take patient tomorrow to or for lap trevor. Hgb 7.1 with major cardiac history, I have ordered a transfusion today. Will recheck on AM labs.

## 2021-12-25 NOTE — ROUTINE PROCESS
Bedside and Verbal shift change report given to Randall Romero RN (oncoming nurse) by Keegan Delgadillo RN (offgoing nurse). Report included the following information SBAR, Kardex, Intake/Output, MAR and Recent Results.

## 2021-12-25 NOTE — PROGRESS NOTES
Mr. Tam Marley has no c/o today. Tm 99.4 Tc 98.8 HR: 63 BP: 134/64 Resp Rate: 16 95% sat on room air. Intake/Output Summary (Last 24 hours) at 12/25/2021 0910  Last data filed at 12/24/2021 2106  Gross per 24 hour   Intake    Output 300 ml   Net -300 ml   Exam: Cor: RRR. Lungs: Bilateral breath sounds. Clear to auscultation. Abd: Soft. Non distended. Non tender. No guarding or rebound. Labs:   Recent Results (from the past 12 hour(s))   GLUCOSE, POC    Collection Time: 12/24/21 11:39 PM   Result Value Ref Range    Glucose (POC) 98 65 - 117 mg/dL    Performed by Kole Altman    METABOLIC PANEL, COMPREHENSIVE    Collection Time: 12/25/21  4:04 AM   Result Value Ref Range    Sodium 136 136 - 145 mmol/L    Potassium 3.3 (L) 3.5 - 5.1 mmol/L    Chloride 106 97 - 108 mmol/L    CO2 23 21 - 32 mmol/L    Anion gap 7 5 - 15 mmol/L    Glucose 78 65 - 100 mg/dL    BUN 7 6 - 20 MG/DL    Creatinine 0.71 0.70 - 1.30 MG/DL    BUN/Creatinine ratio 10 (L) 12 - 20      GFR est AA >60 >60 ml/min/1.73m2    GFR est non-AA >60 >60 ml/min/1.73m2    Calcium 8.4 (L) 8.5 - 10.1 MG/DL    Bilirubin, total 1.2 (H) 0.2 - 1.0 MG/DL    ALT (SGPT) 151 (H) 12 - 78 U/L    AST (SGOT) 67 (H) 15 - 37 U/L    Alk.  phosphatase 398 (H) 45 - 117 U/L    Protein, total 5.0 (L) 6.4 - 8.2 g/dL    Albumin 2.2 (L) 3.5 - 5.0 g/dL    Globulin 2.8 2.0 - 4.0 g/dL    A-G Ratio 0.8 (L) 1.1 - 2.2     CBC WITH AUTOMATED DIFF    Collection Time: 12/25/21  4:04 AM   Result Value Ref Range    WBC 2.5 (L) 4.1 - 11.1 K/uL    RBC 2.11 (L) 4.10 - 5.70 M/uL    HGB 7.1 (L) 12.1 - 17.0 g/dL    HCT 20.8 (L) 36.6 - 50.3 %    MCV 98.6 80.0 - 99.0 FL    MCH 33.6 26.0 - 34.0 PG    MCHC 34.1 30.0 - 36.5 g/dL    RDW 18.1 (H) 11.5 - 14.5 %    PLATELET 56 (L) 006 - 400 K/uL    MPV 11.6 8.9 - 12.9 FL    NRBC 0.0 0  WBC    ABSOLUTE NRBC 0.00 0.00 - 0.01 K/uL    NEUTROPHILS 69 32 - 75 %    LYMPHOCYTES 10 (L) 12 - 49 %    MONOCYTES 17 (H) 5 - 13 %    EOSINOPHILS 3 0 - 7 %    BASOPHILS 0 0 - 1 %    IMMATURE GRANULOCYTES 1 (H) 0.0 - 0.5 %    ABS. NEUTROPHILS 1.7 (L) 1.8 - 8.0 K/UL    ABS. LYMPHOCYTES 0.3 (L) 0.8 - 3.5 K/UL    ABS. MONOCYTES 0.4 0.0 - 1.0 K/UL    ABS. EOSINOPHILS 0.1 0.0 - 0.4 K/UL    ABS. BASOPHILS 0.0 0.0 - 0.1 K/UL    ABS. IMM. GRANS. 0.0 0.00 - 0.04 K/UL    DF SMEAR SCANNED      RBC COMMENTS ANISOCYTOSIS  1+        RBC COMMENTS MACROCYTOSIS  1+        RBC COMMENTS OVALOCYTES  1+        RBC COMMENTS POLYCHROMASIA  PRESENT        RBC COMMENTS SCHISTOCYTES  1+       LIPASE    Collection Time: 12/25/21  4:04 AM   Result Value Ref Range    Lipase 301 73 - 393 U/L   GLUCOSE, POC    Collection Time: 12/25/21  6:16 AM   Result Value Ref Range    Glucose (POC) 93 65 - 117 mg/dL    Performed by Uriel Malik    Mr. Samina Anand is doing well following ERCP and clearance of CBD stone. Clear liquid diet as tolerated. Tentatively to OR on 12/26/2021 for laparoscopic cholecystectomy, possible cholangiogram.   Also explained to him that one of my associates may be doing the procedure. He understands and wishes to proceed. NPO after midnight. Pain medication and anti-emetics as needed. OOB, Ambulate. Plans per Dr. Kimberley Swift.

## 2021-12-26 ENCOUNTER — APPOINTMENT (OUTPATIENT)
Dept: GENERAL RADIOLOGY | Age: 81
DRG: 417 | End: 2021-12-26
Attending: SURGERY
Payer: MEDICARE

## 2021-12-26 ENCOUNTER — ANESTHESIA EVENT (OUTPATIENT)
Dept: SURGERY | Age: 81
DRG: 417 | End: 2021-12-26
Payer: MEDICARE

## 2021-12-26 ENCOUNTER — ANESTHESIA (OUTPATIENT)
Dept: SURGERY | Age: 81
DRG: 417 | End: 2021-12-26
Payer: MEDICARE

## 2021-12-26 LAB
ABO + RH BLD: NORMAL
ALBUMIN SERPL-MCNC: 2.4 G/DL (ref 3.5–5)
ALBUMIN/GLOB SERPL: 0.9 {RATIO} (ref 1.1–2.2)
ALP SERPL-CCNC: 380 U/L (ref 45–117)
ALT SERPL-CCNC: 139 U/L (ref 12–78)
ANION GAP SERPL CALC-SCNC: 5 MMOL/L (ref 5–15)
AST SERPL-CCNC: 64 U/L (ref 15–37)
BASOPHILS # BLD: 0 K/UL (ref 0–0.1)
BASOPHILS NFR BLD: 0 % (ref 0–1)
BILIRUB SERPL-MCNC: 1.2 MG/DL (ref 0.2–1)
BLD PROD TYP BPU: NORMAL
BLOOD GROUP ANTIBODIES SERPL: NORMAL
BPU ID: NORMAL
BUN SERPL-MCNC: 7 MG/DL (ref 6–20)
BUN/CREAT SERPL: 9 (ref 12–20)
CALCIUM SERPL-MCNC: 8.7 MG/DL (ref 8.5–10.1)
CHLORIDE SERPL-SCNC: 106 MMOL/L (ref 97–108)
CO2 SERPL-SCNC: 26 MMOL/L (ref 21–32)
CREAT SERPL-MCNC: 0.8 MG/DL (ref 0.7–1.3)
CROSSMATCH RESULT,%XM: NORMAL
DIFFERENTIAL METHOD BLD: ABNORMAL
EOSINOPHIL # BLD: 0.1 K/UL (ref 0–0.4)
EOSINOPHIL NFR BLD: 3 % (ref 0–7)
ERYTHROCYTE [DISTWIDTH] IN BLOOD BY AUTOMATED COUNT: 18.3 % (ref 11.5–14.5)
GLOBULIN SER CALC-MCNC: 2.8 G/DL (ref 2–4)
GLUCOSE BLD STRIP.AUTO-MCNC: 102 MG/DL (ref 65–117)
GLUCOSE BLD STRIP.AUTO-MCNC: 184 MG/DL (ref 65–117)
GLUCOSE BLD STRIP.AUTO-MCNC: 86 MG/DL (ref 65–117)
GLUCOSE SERPL-MCNC: 75 MG/DL (ref 65–100)
HCT VFR BLD AUTO: 24.5 % (ref 36.6–50.3)
HGB BLD-MCNC: 8.2 G/DL (ref 12.1–17)
IMM GRANULOCYTES # BLD AUTO: 0 K/UL (ref 0–0.04)
IMM GRANULOCYTES NFR BLD AUTO: 1 % (ref 0–0.5)
LYMPHOCYTES # BLD: 0.4 K/UL (ref 0.8–3.5)
LYMPHOCYTES NFR BLD: 16 % (ref 12–49)
MCH RBC QN AUTO: 32.7 PG (ref 26–34)
MCHC RBC AUTO-ENTMCNC: 33.5 G/DL (ref 30–36.5)
MCV RBC AUTO: 97.6 FL (ref 80–99)
MONOCYTES # BLD: 0.5 K/UL (ref 0–1)
MONOCYTES NFR BLD: 22 % (ref 5–13)
NEUTS SEG # BLD: 1.3 K/UL (ref 1.8–8)
NEUTS SEG NFR BLD: 58 % (ref 32–75)
NRBC # BLD: 0 K/UL (ref 0–0.01)
NRBC BLD-RTO: 0 PER 100 WBC
PLATELET # BLD AUTO: 57 K/UL (ref 150–400)
PMV BLD AUTO: 12.2 FL (ref 8.9–12.9)
POTASSIUM SERPL-SCNC: 3.4 MMOL/L (ref 3.5–5.1)
PROT SERPL-MCNC: 5.2 G/DL (ref 6.4–8.2)
RBC # BLD AUTO: 2.51 M/UL (ref 4.1–5.7)
RBC MORPH BLD: ABNORMAL
RBC MORPH BLD: ABNORMAL
SERVICE CMNT-IMP: ABNORMAL
SERVICE CMNT-IMP: NORMAL
SERVICE CMNT-IMP: NORMAL
SODIUM SERPL-SCNC: 137 MMOL/L (ref 136–145)
SPECIMEN EXP DATE BLD: NORMAL
STATUS OF UNIT,%ST: NORMAL
UNIT DIVISION, %UDIV: 0
WBC # BLD AUTO: 2.3 K/UL (ref 4.1–11.1)

## 2021-12-26 PROCEDURE — 74011250637 HC RX REV CODE- 250/637: Performed by: SURGERY

## 2021-12-26 PROCEDURE — 77030026438 HC STYL ET INTUB CARD -A: Performed by: STUDENT IN AN ORGANIZED HEALTH CARE EDUCATION/TRAINING PROGRAM

## 2021-12-26 PROCEDURE — 77030008603 HC TRCR ENDOSC EPATH J&J -C: Performed by: SURGERY

## 2021-12-26 PROCEDURE — 77030020053 HC ELECTRD LAPSCP COVD -B: Performed by: SURGERY

## 2021-12-26 PROCEDURE — 74011000636 HC RX REV CODE- 636: Performed by: SURGERY

## 2021-12-26 PROCEDURE — 74011000258 HC RX REV CODE- 258: Performed by: FAMILY MEDICINE

## 2021-12-26 PROCEDURE — 65270000032 HC RM SEMIPRIVATE

## 2021-12-26 PROCEDURE — 77030002933 HC SUT MCRYL J&J -A: Performed by: SURGERY

## 2021-12-26 PROCEDURE — 88304 TISSUE EXAM BY PATHOLOGIST: CPT

## 2021-12-26 PROCEDURE — 36415 COLL VENOUS BLD VENIPUNCTURE: CPT

## 2021-12-26 PROCEDURE — 76210000006 HC OR PH I REC 0.5 TO 1 HR: Performed by: SURGERY

## 2021-12-26 PROCEDURE — 74011250636 HC RX REV CODE- 250/636: Performed by: FAMILY MEDICINE

## 2021-12-26 PROCEDURE — 74011000258 HC RX REV CODE- 258: Performed by: SURGERY

## 2021-12-26 PROCEDURE — 2709999900 HC NON-CHARGEABLE SUPPLY: Performed by: SURGERY

## 2021-12-26 PROCEDURE — 77030007955 HC PCH ENDOSC SPEC J&J -B: Performed by: SURGERY

## 2021-12-26 PROCEDURE — 77030002967 HC SUT PDS J&J -B: Performed by: SURGERY

## 2021-12-26 PROCEDURE — BF141ZZ FLUOROSCOPY OF GALLBLADDER, BILE DUCTS AND PANCREATIC DUCTS USING LOW OSMOLAR CONTRAST: ICD-10-PCS | Performed by: SURGERY

## 2021-12-26 PROCEDURE — 74011000250 HC RX REV CODE- 250: Performed by: ANESTHESIOLOGY

## 2021-12-26 PROCEDURE — 77030008756 HC TU IRR SUC STRY -B: Performed by: SURGERY

## 2021-12-26 PROCEDURE — 85025 COMPLETE CBC W/AUTO DIFF WBC: CPT

## 2021-12-26 PROCEDURE — 77030039895 HC SYST SMK EVAC LAP COVD -B: Performed by: SURGERY

## 2021-12-26 PROCEDURE — 0FT44ZZ RESECTION OF GALLBLADDER, PERCUTANEOUS ENDOSCOPIC APPROACH: ICD-10-PCS | Performed by: SURGERY

## 2021-12-26 PROCEDURE — 77030040361 HC SLV COMPR DVT MDII -B: Performed by: SURGERY

## 2021-12-26 PROCEDURE — 76060000034 HC ANESTHESIA 1.5 TO 2 HR: Performed by: SURGERY

## 2021-12-26 PROCEDURE — 74300 X-RAY BILE DUCTS/PANCREAS: CPT

## 2021-12-26 PROCEDURE — 74011000250 HC RX REV CODE- 250: Performed by: SURGERY

## 2021-12-26 PROCEDURE — 99232 SBSQ HOSP IP/OBS MODERATE 35: CPT | Performed by: SURGERY

## 2021-12-26 PROCEDURE — 77030004813 HC CATH CHOLGM TELE -B: Performed by: SURGERY

## 2021-12-26 PROCEDURE — 47563 LAPARO CHOLECYSTECTOMY/GRAPH: CPT | Performed by: SURGERY

## 2021-12-26 PROCEDURE — 76010000153 HC OR TIME 1.5 TO 2 HR: Performed by: SURGERY

## 2021-12-26 PROCEDURE — 74011636637 HC RX REV CODE- 636/637: Performed by: INTERNAL MEDICINE

## 2021-12-26 PROCEDURE — 76210000020 HC REC RM PH II FIRST 0.5 HR: Performed by: SURGERY

## 2021-12-26 PROCEDURE — 77030010513 HC APPL CLP LIG J&J -C: Performed by: SURGERY

## 2021-12-26 PROCEDURE — 74011636637 HC RX REV CODE- 636/637: Performed by: SURGERY

## 2021-12-26 PROCEDURE — 74011250636 HC RX REV CODE- 250/636: Performed by: STUDENT IN AN ORGANIZED HEALTH CARE EDUCATION/TRAINING PROGRAM

## 2021-12-26 PROCEDURE — 82962 GLUCOSE BLOOD TEST: CPT

## 2021-12-26 PROCEDURE — 77030010507 HC ADH SKN DERMBND J&J -B: Performed by: SURGERY

## 2021-12-26 PROCEDURE — 80053 COMPREHEN METABOLIC PANEL: CPT

## 2021-12-26 PROCEDURE — 77030008684 HC TU ET CUF COVD -B: Performed by: STUDENT IN AN ORGANIZED HEALTH CARE EDUCATION/TRAINING PROGRAM

## 2021-12-26 PROCEDURE — 74011250636 HC RX REV CODE- 250/636: Performed by: ANESTHESIOLOGY

## 2021-12-26 PROCEDURE — 77030008602 HC TRCR ENDOSC EPATH J&J -B: Performed by: SURGERY

## 2021-12-26 PROCEDURE — 77030002966 HC SUT PDS J&J -A: Performed by: SURGERY

## 2021-12-26 PROCEDURE — 77030010031 HC SCIS ENDOSC MPLR J&J -C: Performed by: SURGERY

## 2021-12-26 PROCEDURE — 77030013079 HC BLNKT BAIR HGGR 3M -A: Performed by: STUDENT IN AN ORGANIZED HEALTH CARE EDUCATION/TRAINING PROGRAM

## 2021-12-26 RX ORDER — DIPHENHYDRAMINE HYDROCHLORIDE 50 MG/ML
12.5 INJECTION, SOLUTION INTRAMUSCULAR; INTRAVENOUS AS NEEDED
Status: DISCONTINUED | OUTPATIENT
Start: 2021-12-26 | End: 2021-12-26 | Stop reason: HOSPADM

## 2021-12-26 RX ORDER — LIDOCAINE HYDROCHLORIDE AND EPINEPHRINE 10; 10 MG/ML; UG/ML
INJECTION, SOLUTION INFILTRATION; PERINEURAL AS NEEDED
Status: DISCONTINUED | OUTPATIENT
Start: 2021-12-26 | End: 2021-12-26 | Stop reason: HOSPADM

## 2021-12-26 RX ORDER — MIDAZOLAM HYDROCHLORIDE 1 MG/ML
1 INJECTION, SOLUTION INTRAMUSCULAR; INTRAVENOUS AS NEEDED
Status: DISCONTINUED | OUTPATIENT
Start: 2021-12-26 | End: 2021-12-26 | Stop reason: HOSPADM

## 2021-12-26 RX ORDER — SODIUM CHLORIDE 9 MG/ML
125 INJECTION, SOLUTION INTRAVENOUS CONTINUOUS
Status: DISCONTINUED | OUTPATIENT
Start: 2021-12-26 | End: 2021-12-26 | Stop reason: HOSPADM

## 2021-12-26 RX ORDER — HYDROMORPHONE HYDROCHLORIDE 1 MG/ML
0.2 INJECTION, SOLUTION INTRAMUSCULAR; INTRAVENOUS; SUBCUTANEOUS
Status: DISCONTINUED | OUTPATIENT
Start: 2021-12-26 | End: 2021-12-26 | Stop reason: HOSPADM

## 2021-12-26 RX ORDER — ROCURONIUM BROMIDE 10 MG/ML
INJECTION, SOLUTION INTRAVENOUS AS NEEDED
Status: DISCONTINUED | OUTPATIENT
Start: 2021-12-26 | End: 2021-12-26 | Stop reason: HOSPADM

## 2021-12-26 RX ORDER — EPHEDRINE SULFATE/0.9% NACL/PF 50 MG/5 ML
SYRINGE (ML) INTRAVENOUS AS NEEDED
Status: DISCONTINUED | OUTPATIENT
Start: 2021-12-26 | End: 2021-12-26 | Stop reason: HOSPADM

## 2021-12-26 RX ORDER — SODIUM CHLORIDE, SODIUM LACTATE, POTASSIUM CHLORIDE, CALCIUM CHLORIDE 600; 310; 30; 20 MG/100ML; MG/100ML; MG/100ML; MG/100ML
1000 INJECTION, SOLUTION INTRAVENOUS CONTINUOUS
Status: DISCONTINUED | OUTPATIENT
Start: 2021-12-26 | End: 2021-12-26 | Stop reason: HOSPADM

## 2021-12-26 RX ORDER — SODIUM CHLORIDE 0.9 % (FLUSH) 0.9 %
5-40 SYRINGE (ML) INJECTION AS NEEDED
Status: DISCONTINUED | OUTPATIENT
Start: 2021-12-26 | End: 2021-12-28 | Stop reason: HOSPADM

## 2021-12-26 RX ORDER — FENTANYL CITRATE 50 UG/ML
INJECTION, SOLUTION INTRAMUSCULAR; INTRAVENOUS
Status: DISPENSED
Start: 2021-12-26 | End: 2021-12-26

## 2021-12-26 RX ORDER — SODIUM CHLORIDE, SODIUM LACTATE, POTASSIUM CHLORIDE, CALCIUM CHLORIDE 600; 310; 30; 20 MG/100ML; MG/100ML; MG/100ML; MG/100ML
125 INJECTION, SOLUTION INTRAVENOUS CONTINUOUS
Status: DISCONTINUED | OUTPATIENT
Start: 2021-12-26 | End: 2021-12-26 | Stop reason: HOSPADM

## 2021-12-26 RX ORDER — LIDOCAINE HYDROCHLORIDE 10 MG/ML
0.1 INJECTION, SOLUTION EPIDURAL; INFILTRATION; INTRACAUDAL; PERINEURAL AS NEEDED
Status: DISCONTINUED | OUTPATIENT
Start: 2021-12-26 | End: 2021-12-26 | Stop reason: HOSPADM

## 2021-12-26 RX ORDER — MIDAZOLAM HYDROCHLORIDE 1 MG/ML
0.5 INJECTION, SOLUTION INTRAMUSCULAR; INTRAVENOUS
Status: DISCONTINUED | OUTPATIENT
Start: 2021-12-26 | End: 2021-12-26 | Stop reason: HOSPADM

## 2021-12-26 RX ORDER — FENTANYL CITRATE 50 UG/ML
INJECTION, SOLUTION INTRAMUSCULAR; INTRAVENOUS AS NEEDED
Status: DISCONTINUED | OUTPATIENT
Start: 2021-12-26 | End: 2021-12-26 | Stop reason: HOSPADM

## 2021-12-26 RX ORDER — ONDANSETRON 2 MG/ML
4 INJECTION INTRAMUSCULAR; INTRAVENOUS AS NEEDED
Status: DISCONTINUED | OUTPATIENT
Start: 2021-12-26 | End: 2021-12-26 | Stop reason: HOSPADM

## 2021-12-26 RX ORDER — FENTANYL CITRATE 50 UG/ML
25 INJECTION, SOLUTION INTRAMUSCULAR; INTRAVENOUS
Status: DISCONTINUED | OUTPATIENT
Start: 2021-12-26 | End: 2021-12-26 | Stop reason: HOSPADM

## 2021-12-26 RX ORDER — LIDOCAINE HYDROCHLORIDE 20 MG/ML
INJECTION, SOLUTION EPIDURAL; INFILTRATION; INTRACAUDAL; PERINEURAL AS NEEDED
Status: DISCONTINUED | OUTPATIENT
Start: 2021-12-26 | End: 2021-12-26 | Stop reason: HOSPADM

## 2021-12-26 RX ORDER — MIDAZOLAM HYDROCHLORIDE 1 MG/ML
INJECTION, SOLUTION INTRAMUSCULAR; INTRAVENOUS AS NEEDED
Status: DISCONTINUED | OUTPATIENT
Start: 2021-12-26 | End: 2021-12-26 | Stop reason: HOSPADM

## 2021-12-26 RX ORDER — SODIUM CHLORIDE 0.9 % (FLUSH) 0.9 %
5-40 SYRINGE (ML) INJECTION EVERY 8 HOURS
Status: DISCONTINUED | OUTPATIENT
Start: 2021-12-26 | End: 2021-12-26 | Stop reason: HOSPADM

## 2021-12-26 RX ORDER — SODIUM CHLORIDE 9 MG/ML
1000 INJECTION, SOLUTION INTRAVENOUS CONTINUOUS
Status: DISCONTINUED | OUTPATIENT
Start: 2021-12-26 | End: 2021-12-26 | Stop reason: HOSPADM

## 2021-12-26 RX ORDER — GLYCOPYRROLATE 0.2 MG/ML
INJECTION INTRAMUSCULAR; INTRAVENOUS AS NEEDED
Status: DISCONTINUED | OUTPATIENT
Start: 2021-12-26 | End: 2021-12-26 | Stop reason: HOSPADM

## 2021-12-26 RX ORDER — EPHEDRINE SULFATE/0.9% NACL/PF 50 MG/5 ML
5 SYRINGE (ML) INTRAVENOUS AS NEEDED
Status: DISCONTINUED | OUTPATIENT
Start: 2021-12-26 | End: 2021-12-26 | Stop reason: HOSPADM

## 2021-12-26 RX ORDER — SODIUM CHLORIDE 0.9 % (FLUSH) 0.9 %
5-40 SYRINGE (ML) INJECTION AS NEEDED
Status: DISCONTINUED | OUTPATIENT
Start: 2021-12-26 | End: 2021-12-26 | Stop reason: HOSPADM

## 2021-12-26 RX ORDER — MORPHINE SULFATE 2 MG/ML
2-4 INJECTION, SOLUTION INTRAMUSCULAR; INTRAVENOUS
Status: DISCONTINUED | OUTPATIENT
Start: 2021-12-26 | End: 2021-12-28 | Stop reason: HOSPADM

## 2021-12-26 RX ORDER — ONDANSETRON 2 MG/ML
INJECTION INTRAMUSCULAR; INTRAVENOUS AS NEEDED
Status: DISCONTINUED | OUTPATIENT
Start: 2021-12-26 | End: 2021-12-26 | Stop reason: HOSPADM

## 2021-12-26 RX ORDER — HYDROMORPHONE HYDROCHLORIDE 2 MG/ML
INJECTION, SOLUTION INTRAMUSCULAR; INTRAVENOUS; SUBCUTANEOUS AS NEEDED
Status: DISCONTINUED | OUTPATIENT
Start: 2021-12-26 | End: 2021-12-26 | Stop reason: HOSPADM

## 2021-12-26 RX ORDER — SODIUM CHLORIDE 0.9 % (FLUSH) 0.9 %
5-40 SYRINGE (ML) INJECTION EVERY 8 HOURS
Status: DISCONTINUED | OUTPATIENT
Start: 2021-12-26 | End: 2021-12-28 | Stop reason: HOSPADM

## 2021-12-26 RX ORDER — ESMOLOL HYDROCHLORIDE 10 MG/ML
INJECTION INTRAVENOUS AS NEEDED
Status: DISCONTINUED | OUTPATIENT
Start: 2021-12-26 | End: 2021-12-26 | Stop reason: HOSPADM

## 2021-12-26 RX ORDER — PROPOFOL 10 MG/ML
INJECTION, EMULSION INTRAVENOUS AS NEEDED
Status: DISCONTINUED | OUTPATIENT
Start: 2021-12-26 | End: 2021-12-26 | Stop reason: HOSPADM

## 2021-12-26 RX ORDER — SODIUM CHLORIDE, SODIUM LACTATE, POTASSIUM CHLORIDE, CALCIUM CHLORIDE 600; 310; 30; 20 MG/100ML; MG/100ML; MG/100ML; MG/100ML
INJECTION, SOLUTION INTRAVENOUS
Status: DISCONTINUED | OUTPATIENT
Start: 2021-12-26 | End: 2021-12-26 | Stop reason: HOSPADM

## 2021-12-26 RX ORDER — DEXAMETHASONE SODIUM PHOSPHATE 4 MG/ML
INJECTION, SOLUTION INTRA-ARTICULAR; INTRALESIONAL; INTRAMUSCULAR; INTRAVENOUS; SOFT TISSUE AS NEEDED
Status: DISCONTINUED | OUTPATIENT
Start: 2021-12-26 | End: 2021-12-26 | Stop reason: HOSPADM

## 2021-12-26 RX ORDER — OXYCODONE AND ACETAMINOPHEN 5; 325 MG/1; MG/1
1 TABLET ORAL AS NEEDED
Status: DISCONTINUED | OUTPATIENT
Start: 2021-12-26 | End: 2021-12-26 | Stop reason: HOSPADM

## 2021-12-26 RX ORDER — SUCCINYLCHOLINE CHLORIDE 20 MG/ML
INJECTION INTRAMUSCULAR; INTRAVENOUS AS NEEDED
Status: DISCONTINUED | OUTPATIENT
Start: 2021-12-26 | End: 2021-12-26 | Stop reason: HOSPADM

## 2021-12-26 RX ORDER — FENTANYL CITRATE 50 UG/ML
50 INJECTION, SOLUTION INTRAMUSCULAR; INTRAVENOUS AS NEEDED
Status: DISCONTINUED | OUTPATIENT
Start: 2021-12-26 | End: 2021-12-26 | Stop reason: HOSPADM

## 2021-12-26 RX ORDER — ACETAMINOPHEN 500 MG
1000 TABLET ORAL ONCE
Status: DISCONTINUED | OUTPATIENT
Start: 2021-12-26 | End: 2021-12-26 | Stop reason: HOSPADM

## 2021-12-26 RX ORDER — MORPHINE SULFATE 2 MG/ML
2 INJECTION, SOLUTION INTRAMUSCULAR; INTRAVENOUS
Status: DISCONTINUED | OUTPATIENT
Start: 2021-12-26 | End: 2021-12-26 | Stop reason: HOSPADM

## 2021-12-26 RX ADMIN — ESMOLOL HYDROCHLORIDE 20 MG: 10 INJECTION, SOLUTION INTRAVENOUS at 09:30

## 2021-12-26 RX ADMIN — ATORVASTATIN CALCIUM 40 MG: 40 TABLET, FILM COATED ORAL at 17:07

## 2021-12-26 RX ADMIN — MIDAZOLAM 1 MG: 1 INJECTION INTRAMUSCULAR; INTRAVENOUS at 09:10

## 2021-12-26 RX ADMIN — METOPROLOL SUCCINATE 25 MG: 25 TABLET, FILM COATED, EXTENDED RELEASE ORAL at 13:00

## 2021-12-26 RX ADMIN — DEXTROSE MONOHYDRATE AND SODIUM CHLORIDE 50 ML/HR: 5; .9 INJECTION, SOLUTION INTRAVENOUS at 16:20

## 2021-12-26 RX ADMIN — SUGAMMADEX 200 MG: 100 INJECTION, SOLUTION INTRAVENOUS at 10:43

## 2021-12-26 RX ADMIN — HYDRALAZINE HYDROCHLORIDE 10 MG: 20 INJECTION INTRAMUSCULAR; INTRAVENOUS at 11:19

## 2021-12-26 RX ADMIN — LISINOPRIL 10 MG: 10 TABLET ORAL at 17:07

## 2021-12-26 RX ADMIN — ACETAMINOPHEN 1000 MG: 500 TABLET ORAL at 21:40

## 2021-12-26 RX ADMIN — LIDOCAINE HYDROCHLORIDE 80 MG: 20 INJECTION, SOLUTION EPIDURAL; INFILTRATION; INTRACAUDAL; PERINEURAL at 09:25

## 2021-12-26 RX ADMIN — ONDANSETRON HYDROCHLORIDE 4 MG: 2 INJECTION, SOLUTION INTRAMUSCULAR; INTRAVENOUS at 10:30

## 2021-12-26 RX ADMIN — OXYCODONE HYDROCHLORIDE 10 MG: 5 TABLET ORAL at 13:00

## 2021-12-26 RX ADMIN — PHENYLEPHRINE HYDROCHLORIDE 40 MCG/MIN: 10 INJECTION INTRAVENOUS at 09:45

## 2021-12-26 RX ADMIN — SODIUM CHLORIDE 125 ML/HR: 900 INJECTION, SOLUTION INTRAVENOUS at 12:40

## 2021-12-26 RX ADMIN — GABAPENTIN 300 MG: 300 CAPSULE ORAL at 17:07

## 2021-12-26 RX ADMIN — PREDNISONE 2 MG: 1 TABLET ORAL at 13:00

## 2021-12-26 RX ADMIN — PIPERACILLIN SODIUM AND TAZOBACTAM SODIUM 3.38 G: 3; .375 INJECTION, POWDER, LYOPHILIZED, FOR SOLUTION INTRAVENOUS at 02:04

## 2021-12-26 RX ADMIN — HYDROCHLOROTHIAZIDE 12.5 MG: 25 TABLET ORAL at 13:01

## 2021-12-26 RX ADMIN — FENTANYL CITRATE 50 MCG: 50 INJECTION, SOLUTION INTRAMUSCULAR; INTRAVENOUS at 09:38

## 2021-12-26 RX ADMIN — GLYCOPYRROLATE 0.4 MG: 0.2 INJECTION, SOLUTION INTRAMUSCULAR; INTRAVENOUS at 09:30

## 2021-12-26 RX ADMIN — HYDROMORPHONE HYDROCHLORIDE 0.5 MG: 2 INJECTION, SOLUTION INTRAMUSCULAR; INTRAVENOUS; SUBCUTANEOUS at 10:30

## 2021-12-26 RX ADMIN — OXYCODONE HYDROCHLORIDE 10 MG: 5 TABLET ORAL at 17:07

## 2021-12-26 RX ADMIN — Medication 10 ML: at 07:12

## 2021-12-26 RX ADMIN — PROPOFOL 150 MG: 10 INJECTION, EMULSION INTRAVENOUS at 09:26

## 2021-12-26 RX ADMIN — FENTANYL CITRATE 25 MCG: 50 INJECTION INTRAMUSCULAR; INTRAVENOUS at 11:12

## 2021-12-26 RX ADMIN — HYDROXYCHLOROQUINE SULFATE 200 MG: 200 TABLET ORAL at 17:07

## 2021-12-26 RX ADMIN — ROCURONIUM BROMIDE 25 MG: 10 SOLUTION INTRAVENOUS at 09:35

## 2021-12-26 RX ADMIN — GABAPENTIN 300 MG: 300 CAPSULE ORAL at 13:01

## 2021-12-26 RX ADMIN — Medication 10 MG: at 09:30

## 2021-12-26 RX ADMIN — PIPERACILLIN SODIUM AND TAZOBACTAM SODIUM 3.38 G: 3; .375 INJECTION, POWDER, LYOPHILIZED, FOR SOLUTION INTRAVENOUS at 10:00

## 2021-12-26 RX ADMIN — SUCCINYLCHOLINE CHLORIDE 140 MG: 20 INJECTION, SOLUTION INTRAMUSCULAR; INTRAVENOUS at 09:26

## 2021-12-26 RX ADMIN — HYDROXYCHLOROQUINE SULFATE 200 MG: 200 TABLET ORAL at 13:03

## 2021-12-26 RX ADMIN — ROCURONIUM BROMIDE 5 MG: 10 SOLUTION INTRAVENOUS at 09:25

## 2021-12-26 RX ADMIN — ESMOLOL HYDROCHLORIDE 30 MG: 10 INJECTION, SOLUTION INTRAVENOUS at 10:03

## 2021-12-26 RX ADMIN — FENTANYL CITRATE 50 MCG: 50 INJECTION, SOLUTION INTRAMUSCULAR; INTRAVENOUS at 09:25

## 2021-12-26 RX ADMIN — SODIUM CHLORIDE, POTASSIUM CHLORIDE, SODIUM LACTATE AND CALCIUM CHLORIDE: 600; 310; 30; 20 INJECTION, SOLUTION INTRAVENOUS at 09:00

## 2021-12-26 RX ADMIN — Medication 10 MG: at 09:33

## 2021-12-26 RX ADMIN — ESMOLOL HYDROCHLORIDE 20 MG: 10 INJECTION, SOLUTION INTRAVENOUS at 10:05

## 2021-12-26 RX ADMIN — DEXAMETHASONE SODIUM PHOSPHATE 8 MG: 4 INJECTION, SOLUTION INTRAMUSCULAR; INTRAVENOUS at 09:42

## 2021-12-26 NOTE — PERIOP NOTES
TRANSFER - OUT REPORT:    Verbal report given to [facility] on [employer].  being transferred to room 508 for routine post - op       Report consisted of patients Situation, Background, Assessment and   Recommendations(SBAR). Time Pre op antibiotic given:  Per schedule at 1000  Anesthesia Stop time:  8011  Yo Present on Transfer to floor: NO  Order for Yo on Chart: N/a    Information from the following report(s) SBAR and MAR was reviewed with the receiving nurse. Opportunity for questions and clarification was provided. Is the patient on 02? NO       L/Min r/a       Other     Is the patient on a monitor? NO    Is the nurse transporting with the patient? NO    Surgical Waiting Area notified of patient's transfer from PACU? NO-family in room or on the way    Lines:   Peripheral IV 12/25/21 Distal;Left; Anterior Forearm (Active)   Site Assessment Clean, dry, & intact 12/26/21 1057   Phlebitis Assessment 0 12/26/21 1057   Infiltration Assessment 0 12/26/21 1057   Dressing Status Clean, dry, & intact 12/26/21 1057   Dressing Type Tape;Transparent 12/26/21 1057   Hub Color/Line Status Pink; Infusing 12/26/21 1057   Action Taken Open ports on tubing capped 12/26/21 1057   Alcohol Cap Used Yes 12/26/21 1057        The following personal items collected during your admission accompanied patient upon transfer:   Dental Appliance: Dental Appliances: None  Vision: Visual Aid: Glasses,With patient  Hearing Aid: Hearing Aid: Left,Right  Jewelry: Jewelry: None  Clothing: Clothing: Other (comment) (glasses in pacu)  Other Valuables:  Other Valuables: Eyeglasses  Valuables sent to safe:

## 2021-12-26 NOTE — PROGRESS NOTES
Bedside shift change report given to Jessica Carl RN (oncoming nurse) by Marietta Goodman and Yeni Kay RN (offgoing nurse). Report included the following information SBAR, Kardex, Intake/Output, MAR and Recent Results.

## 2021-12-26 NOTE — PROGRESS NOTES
Surgery Progress Note    12/26/2021    Admit Date: 12/20/2021    CC: None    S/p ERCP and duct clearance    Subjective:     No acute issues. Got blood yesterday. No abx pain    Constitutional: No fever or chills  Neurologic: No headache  Eyes: No scleral icterus or irritated eyes  Nose: No nasal pain or drainage  Mouth: No oral lesions or sore throat  Cardiac: No palpations or chest pain  Pulmonary: No cough or shortness of breath  Gastrointestinal: No nausea, emesis, diarrhea, or constipation  Genitourinary: No dysuria  Musculoskeletal: No muscle or joint tenderness  Skin: No rashes or lesions  Psychiatric: No anxiety or depressed mood    Objective:     Visit Vitals  BP (!) 157/74 (BP 1 Location: Right upper arm, BP Patient Position: At rest)   Pulse (!) 56   Temp 98.8 °F (37.1 °C)   Resp 13   Ht 5' 7\" (1.702 m)   Wt 146 lb 9.6 oz (66.5 kg)   SpO2 98%   BMI 22.96 kg/m²       General: No acute distress, conversant  Eyes: PERRLA, no scleral icterus  HENT: Normocephalic without oral lesions  Neck: Trachea midline without LAD  Cardiac: Normal pulse rate and rhythm  Pulmonary: Symmetric chest rise with normal effort  GI: Soft, NT, ND, no hernia, no splenomegaly  Skin: Warm without rash  Extremities: No edema or joint stiffness  Psych: Appropriate mood and affect    Labs, vital signs, and I/O reviewed. Assessment:     81 y/o M with choledocholithiasis and acute trevor status post ERCP, Hgb improved, on chronic warfarin with INR normal    Plan:     PRN pain control  IVF  NPO  Cont abx  OR this AM for lap trevor with IOC. Discussed risks of bleeding, infection, cystic duct leak, CBD injury, risk of open surgery, and anesthesia. Pt expresses understanding and elects to proceed.     Moe Harry MD  Bariatric and General Surgeon  [facility]

## 2021-12-26 NOTE — ANESTHESIA PREPROCEDURE EVALUATION
Relevant Problems   CARDIOVASCULAR   (+) CAD (coronary artery disease)   (+) Hypertension      PERSONAL HX & FAMILY HX OF CANCER   (+) SCC (squamous cell carcinoma), face       Anesthetic History   No history of anesthetic complications            Review of Systems / Medical History  Patient summary reviewed, nursing notes reviewed and pertinent labs reviewed    Pulmonary  Within defined limits                 Neuro/Psych   Within defined limits           Cardiovascular    Hypertension      CHF    Pacemaker, CAD, PAD and cardiac stents      Comments: Echo 12/21/2021   Left Ventricle: Left ventricle is mildly dilated. Mildly reduced left ventricular systolic function with a visually estimated EF of 45 - 50%. Wall motion abnormalities as above.   Aortic Valve: Mildly calcified cusps. Mild stenosis.   Mitral Valve: Mild transvalvular regurgitation.   Left Atrium: Left atrium is mildly dilated.      GI/Hepatic/Renal  Within defined limits              Endo/Other        Arthritis and anemia     Other Findings   Comments: SLE           Physical Exam    Airway  Mallampati: II  TM Distance: > 6 cm  Neck ROM: normal range of motion   Mouth opening: Normal     Cardiovascular  Regular rate and rhythm,  S1 and S2 normal,  no murmur, click, rub, or gallop             Dental  No notable dental hx       Pulmonary  Breath sounds clear to auscultation               Abdominal  GI exam deferred       Other Findings            Anesthetic Plan    ASA: 3, emergent  Anesthesia type: general          Induction: Intravenous  Anesthetic plan and risks discussed with: Patient

## 2021-12-26 NOTE — ROUTINE PROCESS
Patient: Aimee Gonzalez. MRN: [id number]  SSN: [id number]   YOB: 1940  Age: 80 y.o. Sex: male     Patient is status post Procedure(s):  CHOLECYSTECTOMY LAPAROSCOPIC with choloangiogram.    Surgeon(s) and Role:     * Buddy Hughes MD - Primary    Local/Dose/Irrigation: 0.9% normal saline used for irrigation                Peripheral IV 12/25/21 Distal;Left; Anterior Forearm (Active)   Site Assessment Clean, dry, & intact 12/26/21 0740   Phlebitis Assessment 0 12/26/21 0740   Infiltration Assessment 0 12/26/21 0740   Dressing Status Clean, dry, & intact 12/26/21 0740   Dressing Type Transparent;Tape 12/26/21 0740   Hub Color/Line Status Pink; Infusing 12/26/21 0740   Action Taken Open ports on tubing capped 12/26/21 0740   Alcohol Cap Used Yes 12/26/21 0740            Airway - Endotracheal Tube 12/26/21 Oral (Active)                   Dressing/Packing:  Wound Arm lower Anterior;Left;Lower raised macular skin lesions 12/22/21-Dressing/Treatment: Open to air (12/25/21 2039)  Wound Arm lower Anterior;Right macular skin lesions 12/22/21-Dressing/Treatment: Open to air (12/25/21 2039)  Wound Chest Left;Superior; Anterior 12/22/21-Dressing/Treatment: Open to air (12/25/21 2039)  Wound Thigh Anterior; Left 7 inch incision, healed where stents were placed 12/22/21-Dressing/Treatment: Open to air (12/25/21 2039)  Incision 12/26/21 Abdomen-Dressing/Treatment: Other (Comment) (Dermabond on skin of 4 trocar sites) (12/26/21 1023)    Splint/Cast:  ]     Other:  SCDs

## 2021-12-26 NOTE — ANESTHESIA POSTPROCEDURE EVALUATION
Procedure(s):  CHOLECYSTECTOMY LAPAROSCOPIC with choloangiogram.    general    Anesthesia Post Evaluation      Multimodal analgesia: multimodal analgesia used between 6 hours prior to anesthesia start to PACU discharge  Patient location during evaluation: bedside  Patient participation: complete - patient participated  Level of consciousness: awake  Pain score: 0  Pain management: satisfactory to patient  Airway patency: patent  Anesthetic complications: no  Cardiovascular status: acceptable and blood pressure returned to baseline  Respiratory status: acceptable  Hydration status: acceptable  Comments: I have evaluated the patient and meets criteria for discharge from PACU. Santhosh Salazar DO. Post anesthesia nausea and vomiting:  none  Final Post Anesthesia Temperature Assessment:  Normothermia (36.0-37.5 degrees C)      INITIAL Post-op Vital signs:   Vitals Value Taken Time   /83 12/26/21 1115   Temp 36.4 °C (97.5 °F) 12/26/21 1057   Pulse 76 12/26/21 1128   Resp 17 12/26/21 1128   SpO2 96 % 12/26/21 1128   Vitals shown include unvalidated device data.

## 2021-12-26 NOTE — PROGRESS NOTES
[facility]                                                                                          Hospitalist Progress Note  Rufina Jj MD  Answering service: [phone] from in house phone        Date of Service:  2021  NAME:  Baudilio Williamson :  1940  MRN:  [id number]      Admission Summary:     Patient presents with abdominal pain and admitted for acute cholecystitis. Patient also with gallstone pancreatitis. Patient evaluated by GI, s/p ERCP  with biliary sphincterotomy and biliary stone removal.  Abdominal pain is improving and lipase levels improving. Plan for general surgery to do cholecystectomy but significant hypoglycemia this a.m. but patient asymptomatic.     Interval history / Subjective:       Overall doing well, no abdominal pain, tolerating diet     Assessment & Plan:     Gallstone pancreatitis  -GI evaluated the patient, s/p ERCP  with biliary sphincterotomy and biliary stone removal  -Intra operative cholangiogram today reveals stone in distal CBD  -GI to reevaluate    Acute cholecystitis  -Patient evaluated by general surgery  -Patient still with elevated bilirubin likely due to swelling with a sphincterotomy and dilatation was done  -Lap trevor today    Hypoglycemia  -Doubt that sepsis or adrenal insufficiency is playing a role, blood cultures so far negative  -Overall improving  -Discontinue D5 once patient eating better post operatively    Elevated troponin due to demand ischemia  -Cardiology previously evaluated patient  -Echo shows EF of 45 to 50%, wall motion abnormalities consistent with prior coronary artery disease history  -Patient is well optimized from cardiac standpoint for surgery    Hypertension  -Continue hydrochlorothiazide, lisinopril, metoprolol    SLE  -Patient has been off of prednisone but restarted on   -Otherwise stable    Thrombocytopenia  -Looks chronic    Dyslipidemia  -Continue Lipitor    Code status: Full  DVT prophylaxis: SCDs    Care Plan discussed with: Patient/Family  Anticipated Disposition: Home w/Family  Anticipated Discharge: 24 hours to 48 hours     Hospital Problems  Date Reviewed: 9/3/2013          Codes Class Noted POA    Cholecystitis ICD-10-CM: K81.9  ICD-9-CM: 575.10  12/21/2021 Unknown                Review of Systems:   A comprehensive review of systems was negative except for that written in the HPI. Vital Signs:    Last 24hrs VS reviewed since prior progress note. Most recent are:  Visit Vitals  BP (!) 144/74   Pulse 74   Temp 97.7 °F (36.5 °C)   Resp 16   Ht 5' 7\" (1.702 m)   Wt 66.5 kg (146 lb 9.6 oz)   SpO2 93%   BMI 22.96 kg/m²         Intake/Output Summary (Last 24 hours) at 12/26/2021 1353  Last data filed at 12/26/2021 1057  Gross per 24 hour   Intake 705 ml   Output 1025 ml   Net -320 ml        Physical Examination:     I had a face to face encounter with this patient and independently examined them on 12/26/2021 as outlined below:          Constitutional:  No acute distress, cooperative, pleasant    ENT:  Oral mucosa moist, oropharynx benign. Resp:  CTA bilaterally. No wheezing/rhonchi/rales. No accessory muscle use   CV:  Regular rhythm, normal rate, no murmurs, gallops, rubs    GI:  Soft, non distended, non tender. normoactive bowel sounds, no hepatosplenomegaly     Musculoskeletal:  No edema, warm, 2+ pulses throughout    Neurologic:  Moves all extremities.   AAOx3, CN II-XII reviewed            Data Review:    Review and/or order of clinical lab test      Labs:     Recent Labs     12/26/21 0439 12/25/21  0404   WBC 2.3* 2.5*   HGB 8.2* 7.1*   HCT 24.5* 20.8*   PLT 57* 56*     Recent Labs     12/26/21 0439 12/25/21  0404    136   K 3.4* 3.3*    106   CO2 26 23   BUN 7 7   CREA 0.80 0.71   GLU 75 78   CA 8.7 8.4*     Recent Labs     12/26/21 0439 12/25/21  0404   * 151*   * 398*   TBILI 1.2* 1.2*   TP 5.2* 5.0*   ALB 2.4* 2.2*   GLOB 2.8 2.8   LPSE  --  301     No results for input(s): INR, PTP, APTT, INREXT, INREXT in the last 72 hours. No results for input(s): FE, TIBC, PSAT, FERR in the last 72 hours. No results found for: FOL, RBCF   No results for input(s): PH, PCO2, PO2 in the last 72 hours. No results for input(s): CPK, CKNDX, TROIQ in the last 72 hours.     No lab exists for component: CPKMB  Lab Results   Component Value Date/Time    Cholesterol, total 181 08/23/2012 09:06 AM    HDL Cholesterol 58 08/23/2012 09:06 AM    LDL, calculated 103 (H) 08/23/2012 09:06 AM    Triglyceride 98 08/23/2012 09:06 AM     Lab Results   Component Value Date/Time    Glucose (POC) 86 12/26/2021 06:37 AM    Glucose (POC) 102 12/26/2021 12:06 AM    Glucose (POC) 72 12/25/2021 04:16 PM    Glucose (POC) 96 12/25/2021 11:27 AM    Glucose (POC) 93 12/25/2021 06:16 AM     No results found for: COLOR, APPRN, SPGRU, REFSG, MITZI, PROTU, GLUCU, KETU, BILU, UROU, MARCELINO, LEUKU, GLUKE, EPSU, BACTU, WBCU, RBCU, CASTS, UCRY      Medications Reviewed:     Current Facility-Administered Medications   Medication Dose Route Frequency    sodium chloride (NS) flush 5-40 mL  5-40 mL IntraVENous Q8H    sodium chloride (NS) flush 5-40 mL  5-40 mL IntraVENous PRN    morphine injection 2-4 mg  2-4 mg IntraVENous Q2H PRN    fentaNYL citrate (PF) 50 mcg/mL injection        0.9% sodium chloride infusion 250 mL  250 mL IntraVENous PRN    0.9% sodium chloride infusion 250 mL  250 mL IntraVENous PRN    acetaminophen (TYLENOL) tablet 1,000 mg  1,000 mg Oral Q6H PRN    oxyCODONE IR (ROXICODONE) tablet 5 mg  5 mg Oral Q4H PRN    oxyCODONE IR (ROXICODONE) tablet 10 mg  10 mg Oral Q4H PRN    dextrose 5% and 0.9% NaCl infusion  50 mL/hr IntraVENous CONTINUOUS    sodium chloride (NS) flush 5-40 mL  5-40 mL IntraVENous Q8H    sodium chloride (NS) flush 5-40 mL  5-40 mL IntraVENous PRN    atorvastatin (LIPITOR) tablet 40 mg  40 mg Oral QPM    hydroCHLOROthiazide (HYDRODIURIL) tablet 12.5 mg  12.5 mg Oral DAILY    lisinopriL (PRINIVIL, ZESTRIL) tablet 10 mg  10 mg Oral QPM    hydrALAZINE (APRESOLINE) 20 mg/mL injection 10 mg  10 mg IntraVENous Q6H PRN    sodium chloride (NS) flush 5-40 mL  5-40 mL IntraVENous Q8H    sodium chloride (NS) flush 5-40 mL  5-40 mL IntraVENous PRN    gabapentin (NEURONTIN) capsule 300 mg  300 mg Oral BID    hydrOXYchloroQUINE (PLAQUENIL) tablet 200 mg  200 mg Oral BID    predniSONE (DELTASONE) tablet 2 mg  2 mg Oral DAILY WITH BREAKFAST    [Held by provider] aspirin delayed-release tablet 81 mg  81 mg Oral DAILY    metoprolol succinate (TOPROL-XL) XL tablet 25 mg  25 mg Oral DAILY     ______________________________________________________________________  EXPECTED LENGTH OF STAY: 4d 9h  ACTUAL LENGTH OF STAY:          6                 Deion Jerry MD

## 2021-12-26 NOTE — OP NOTES
OPERATIVE NOTE    Date of Procedure: 12/26/2021     Preoperative Diagnosis: acute cholecystitis  Postoperative Diagnosis: Common bile duct gallstones      Procedure: Procedure(s):  CHOLECYSTECTOMY LAPAROSCOPIC with cholangiogram    Surgeon: Raya Macdonald MD    Surgical Staff: Circ-1: Jenny Ramírez  Radiology Technician: Nicola Barr RT, R  Scrub RN-1: Cayden Ely RN  Surg Asst-1: Vel Liter    Anesthesia: General   Indications: 79 y/o M admitted for choledocholithiasis and concern for acute trevor. Taken for ERCP and duct clearance last week. Now ready for lap trevor. T bili not yet normal  Findings: Non inflamed gallbladder with positive IOC with 4-5 medium sized stones in distal CBD with duodenal filling. Dilated ducts. Description of Operation: Thelma Lopez. was identified in the pre-operative holding area. Informed consent was obtained after a complete discussion of risks, benefits and alternatives to surgery were had with the patient. The patient was brought back to the operating room and placed under general endotracheal anesthesia in the supine position on the operating room table with a foot board. The patient was then prepped and draped in the usual sterile fashion. A timeout was performed. We then injected local anesthetic into the marco antonio-umbilical skin and subcutaneous tissue. A 12 mm incision was then made using an 11 blade. We dissected down to the abdominal wall fascia at the base of the umbilicus and this was grasped with a Kocher clamp and retracted anteriorly. A small incision was made in the linea alba and a hemostat was used to bluntly enter the abdomen. A 12 mm trocar was safely placed after we confirmed proper location and the abdomen was insufflated to 15 mm Hg. The patient was placed in steep reverse Trendelenburg with the table tilted to the left.  We then placed three additional 5 mm trocars using direct visualization under the right costal margin after the injection of local anesthetic. The dome of the gallbladder was grasped and retracted anteriorly and laterally over the liver edge. The gallbladder was not inflamed. The infundibulum was grasped with a ramona and retracted laterally. Using the hook cautery, the peritoneum of the gallbladder was incised around the infundibulum and lateral boarders. We identified the cystic duct and cystic artery and after further dissection obtained the critical view of safety. We then placed two 5mm clips proximally and one distally on the cystic artery and transected it. There was some bleed along the medial portion of the gallbladder that took multiple clips to control. Given the operative indication, an intraoperative cholangiogram was performed. This was done by placing one clip distally on the cystic duct near the infundibulum followed by a small ductotomy created with laparoscopic scissors. An Arrow catheter was then placed into the cystic duct opening and secured with one 5 mm clip. Saline flushed well. Next, water soluble contrast solution was injected into the cystic duct. The findings demonstrated:dilated CBD and hepatic duct, sluggish filling, 4-5 medium sized stones at distal CBD, emptying into duodenum. After completion of the IOC, the clip and catheter were removed. Two clips were then placed on the proximal cystic duct and the duct was then completely transected with scissors. A 0 PDS endoloop was placed around the cystic duct for extra security. The gallbladder was then liberated from the gallbladder fossa and placed in an Endo-catch bag. Hemostasis was confirmed and we ensured no spilled stones. Next, we closed the 12 mm periumbilical trocar site with 0 PDS on a trans-fascial suture passer. We removed the three 5mm trocars under direct visualization to ensure no hemorrhage. We then released the pneumoperitoneum and removed the 12 mm trocar. The specimen was removed via the umbilical trocar site.  The PDS suture was tied down. The dermis was approximated with 4-0 Monocryl suture followed by Dermabond for the skin. At the end of the procedure all instrument, needle, and sponge counts were correct. I was present and scrubbed throughout the entirety of the case. The patient awoke from anesthesia and was extubated without complication and sent to PACU in stable condition. I will contact GI tomorrow to inform them of the persistent stones.       Estimated Blood Loss: 20 mL    Specimens:   ID Type Source Tests Collected by Time Destination   1 : Gallbladder Fresh Gallbladder  Josie Becker MD 12/26/2021 1025 Pathology        Complications: None    Implants: * No implants in log *      Ulysses Paul MD  Bariatric and General Surgeon  [facility]  12/26/2021

## 2021-12-27 LAB
ALBUMIN SERPL-MCNC: 2.9 G/DL (ref 3.5–5)
ALBUMIN/GLOB SERPL: 0.8 {RATIO} (ref 1.1–2.2)
ALP SERPL-CCNC: 417 U/L (ref 45–117)
ALT SERPL-CCNC: 144 U/L (ref 12–78)
ANION GAP SERPL CALC-SCNC: 6 MMOL/L (ref 5–15)
AST SERPL-CCNC: 66 U/L (ref 15–37)
BASOPHILS # BLD: 0 K/UL (ref 0–0.1)
BASOPHILS NFR BLD: 0 % (ref 0–1)
BILIRUB SERPL-MCNC: 1.1 MG/DL (ref 0.2–1)
BUN SERPL-MCNC: 10 MG/DL (ref 6–20)
BUN/CREAT SERPL: 11 (ref 12–20)
CALCIUM SERPL-MCNC: 8.8 MG/DL (ref 8.5–10.1)
CHLORIDE SERPL-SCNC: 104 MMOL/L (ref 97–108)
CO2 SERPL-SCNC: 27 MMOL/L (ref 21–32)
COMMENT, HOLDF: NORMAL
CREAT SERPL-MCNC: 0.89 MG/DL (ref 0.7–1.3)
DIFFERENTIAL METHOD BLD: ABNORMAL
EOSINOPHIL # BLD: 0 K/UL (ref 0–0.4)
EOSINOPHIL NFR BLD: 0 % (ref 0–7)
ERYTHROCYTE [DISTWIDTH] IN BLOOD BY AUTOMATED COUNT: 18.6 % (ref 11.5–14.5)
GLOBULIN SER CALC-MCNC: 3.5 G/DL (ref 2–4)
GLUCOSE SERPL-MCNC: 100 MG/DL (ref 65–100)
HCT VFR BLD AUTO: 27.9 % (ref 36.6–50.3)
HGB BLD-MCNC: 9.2 G/DL (ref 12.1–17)
IMM GRANULOCYTES # BLD AUTO: 0.1 K/UL (ref 0–0.04)
IMM GRANULOCYTES NFR BLD AUTO: 1 % (ref 0–0.5)
LYMPHOCYTES # BLD: 0.2 K/UL (ref 0.8–3.5)
LYMPHOCYTES NFR BLD: 4 % (ref 12–49)
MCH RBC QN AUTO: 32.4 PG (ref 26–34)
MCHC RBC AUTO-ENTMCNC: 33 G/DL (ref 30–36.5)
MCV RBC AUTO: 98.2 FL (ref 80–99)
MONOCYTES # BLD: 0.4 K/UL (ref 0–1)
MONOCYTES NFR BLD: 8 % (ref 5–13)
NEUTS SEG # BLD: 4.5 K/UL (ref 1.8–8)
NEUTS SEG NFR BLD: 87 % (ref 32–75)
NRBC # BLD: 0 K/UL (ref 0–0.01)
NRBC BLD-RTO: 0 PER 100 WBC
PLATELET # BLD AUTO: 66 K/UL (ref 150–400)
PLATELET COMMENTS,PCOM: ABNORMAL
PMV BLD AUTO: 12.3 FL (ref 8.9–12.9)
POTASSIUM SERPL-SCNC: 2.8 MMOL/L (ref 3.5–5.1)
POTASSIUM SERPL-SCNC: 3.4 MMOL/L (ref 3.5–5.1)
PROT SERPL-MCNC: 6.4 G/DL (ref 6.4–8.2)
RBC # BLD AUTO: 2.84 M/UL (ref 4.1–5.7)
RBC MORPH BLD: ABNORMAL
SAMPLES BEING HELD,HOLD: NORMAL
SODIUM SERPL-SCNC: 137 MMOL/L (ref 136–145)
WBC # BLD AUTO: 5.2 K/UL (ref 4.1–11.1)

## 2021-12-27 PROCEDURE — 80053 COMPREHEN METABOLIC PANEL: CPT

## 2021-12-27 PROCEDURE — 97116 GAIT TRAINING THERAPY: CPT

## 2021-12-27 PROCEDURE — 74011250637 HC RX REV CODE- 250/637: Performed by: SURGERY

## 2021-12-27 PROCEDURE — 97164 PT RE-EVAL EST PLAN CARE: CPT

## 2021-12-27 PROCEDURE — 85025 COMPLETE CBC W/AUTO DIFF WBC: CPT

## 2021-12-27 PROCEDURE — 74011250637 HC RX REV CODE- 250/637: Performed by: FAMILY MEDICINE

## 2021-12-27 PROCEDURE — 65270000032 HC RM SEMIPRIVATE

## 2021-12-27 PROCEDURE — 74011000258 HC RX REV CODE- 258: Performed by: SURGERY

## 2021-12-27 PROCEDURE — 36415 COLL VENOUS BLD VENIPUNCTURE: CPT

## 2021-12-27 PROCEDURE — 74011636637 HC RX REV CODE- 636/637: Performed by: SURGERY

## 2021-12-27 PROCEDURE — 99024 POSTOP FOLLOW-UP VISIT: CPT | Performed by: SURGERY

## 2021-12-27 PROCEDURE — 94760 N-INVAS EAR/PLS OXIMETRY 1: CPT

## 2021-12-27 PROCEDURE — 84132 ASSAY OF SERUM POTASSIUM: CPT

## 2021-12-27 RX ORDER — POTASSIUM CHLORIDE 750 MG/1
40 TABLET, FILM COATED, EXTENDED RELEASE ORAL
Status: COMPLETED | OUTPATIENT
Start: 2021-12-27 | End: 2021-12-27

## 2021-12-27 RX ADMIN — LISINOPRIL 10 MG: 10 TABLET ORAL at 17:41

## 2021-12-27 RX ADMIN — METOPROLOL SUCCINATE 25 MG: 25 TABLET, FILM COATED, EXTENDED RELEASE ORAL at 10:08

## 2021-12-27 RX ADMIN — HYDROXYCHLOROQUINE SULFATE 200 MG: 200 TABLET ORAL at 17:41

## 2021-12-27 RX ADMIN — ACETAMINOPHEN 1000 MG: 500 TABLET ORAL at 06:36

## 2021-12-27 RX ADMIN — ACETAMINOPHEN 1000 MG: 500 TABLET ORAL at 20:08

## 2021-12-27 RX ADMIN — ACETAMINOPHEN 1000 MG: 500 TABLET ORAL at 13:21

## 2021-12-27 RX ADMIN — HYDROXYCHLOROQUINE SULFATE 200 MG: 200 TABLET ORAL at 10:08

## 2021-12-27 RX ADMIN — ATORVASTATIN CALCIUM 40 MG: 40 TABLET, FILM COATED ORAL at 17:41

## 2021-12-27 RX ADMIN — GABAPENTIN 300 MG: 300 CAPSULE ORAL at 10:08

## 2021-12-27 RX ADMIN — GABAPENTIN 300 MG: 300 CAPSULE ORAL at 17:41

## 2021-12-27 RX ADMIN — HYDROCHLOROTHIAZIDE 12.5 MG: 25 TABLET ORAL at 10:08

## 2021-12-27 RX ADMIN — PREDNISONE 2 MG: 1 TABLET ORAL at 07:04

## 2021-12-27 RX ADMIN — POTASSIUM CHLORIDE 40 MEQ: 750 TABLET, FILM COATED, EXTENDED RELEASE ORAL at 10:09

## 2021-12-27 RX ADMIN — DEXTROSE MONOHYDRATE AND SODIUM CHLORIDE 50 ML/HR: 5; .9 INJECTION, SOLUTION INTRAVENOUS at 00:11

## 2021-12-27 RX ADMIN — Medication 10 ML: at 13:21

## 2021-12-27 RX ADMIN — DEXTROSE MONOHYDRATE AND SODIUM CHLORIDE 50 ML/HR: 5; .9 INJECTION, SOLUTION INTRAVENOUS at 20:12

## 2021-12-27 NOTE — PROGRESS NOTES
Bedside and Verbal shift change report given to Sukh Dorsey RN (oncoming nurse) by Soledad Wynne RN (offgoing nurse). Report included the following information SBAR, Kardex, Intake/Output and MAR.

## 2021-12-27 NOTE — PROGRESS NOTES
Surgery Progress Note    12/27/2021    Admit Date: 12/20/2021    CC: None    S/p ERCP and duct clearance  12/26 Lap trevor with positive IOC    Subjective:     Feels well. No issues. Constitutional: No fever or chills  Neurologic: No headache  Eyes: No scleral icterus or irritated eyes  Nose: No nasal pain or drainage  Mouth: No oral lesions or sore throat  Cardiac: No palpations or chest pain  Pulmonary: No cough or shortness of breath  Gastrointestinal: Abd pain, No nausea, emesis, diarrhea, or constipation  Genitourinary: No dysuria  Musculoskeletal: No muscle or joint tenderness  Skin: No rashes or lesions  Psychiatric: No anxiety or depressed mood    Objective:     Visit Vitals  BP (!) 153/76   Pulse 70   Temp 97.6 °F (36.4 °C)   Resp 16   Ht 5' 7\" (1.702 m)   Wt 146 lb 9.6 oz (66.5 kg)   SpO2 94%   BMI 22.96 kg/m²       General: No acute distress, conversant  Eyes: PERRLA, no scleral icterus  HENT: Normocephalic without oral lesions  Neck: Trachea midline without LAD  Cardiac: Normal pulse rate and rhythm  Pulmonary: Symmetric chest rise with normal effort  GI: Soft, NT, ND, no hernia, no splenomegaly  Skin: Warm without rash  Extremities: No edema or joint stiffness  Psych: Appropriate mood and affect    Labs, vital signs, and I/O reviewed. Assessment:     79 y/o M with choledocholithiasis and acute trevor status post ERCP, Hgb improved, on chronic warfarin with INR normal    Plan:     PRN pain control  IVF  Reg diet  hgb stable. Can resume blood thinner. No abx    Stones remained on IOC. I spoke with Dr. Karina Wilson this AM and plan to observe and hope stones pass. Okay to discharge home once medically cleared with GI follow up and follow up with me in two weeks.     Linda Moses MD  Bariatric and General Surgeon  [facility]

## 2021-12-27 NOTE — PROGRESS NOTES
Transition of Care: TBD; likely home with f/u with specialist/pcp     Transport Plan: TBD:  likely in car with family     RUR: 13%     Main Contact: Spouse - Akua Akhtar - [phone]     Discharge pending:  -s/p lap colo on 12/26  -pending medical progress and care recommendations       NOTE: Patient is alert and oriented x4 and confirmed demographics.    Baseline: DME - none  ADLs/IDALS: Independent   Previous Home Health: N/A  Previous SNF/IPR: N/A  ER Contact: Spouse - Akua Akhtar - [phone]   Patient lives in [id number] [place] [address] with 4 steps to enter. Patient lives with his spouse. Patient is independent with ADLs/IDALs.    Patient does not utilize DMEs to ambulate. Patient's preferred pharmacy is [facility] located on [facility] for his perscriptions.  Patient's spouse is expected to transport at discharge.      CM noted from chart and in [address] [place] [address] following  Jose Luis Cherry RN, CRM

## 2021-12-27 NOTE — PROGRESS NOTES
Problem: Mobility Impaired (Adult and Pediatric)  Goal: *Acute Goals and Plan of Care (Insert Text)  Description: FUNCTIONAL STATUS PRIOR TO ADMISSION: Patient was independent and active without use of DME.      HOME SUPPORT PRIOR TO ADMISSION: The patient lived with wife but did not require assist.      Physical Therapy Goals  Revised 12/27/2021  1. Patient will move from supine to sit and sit to supine  in bed with independence within 7 day(s). 2.  Patient will transfer from bed to chair and chair to bed with modified independence using the least restrictive device within 7 day(s). 3.  Patient will perform sit to stand with modified independence within 7 day(s). 4.  Patient will ambulate with modified independence for 300 feet with the least restrictive device within 7 day(s). 5.  Patient will ascend/descend 4 stairs with 1 handrail(s) with modified independence within 7 day(s). Physical Therapy Goals  Initiated 12/24/2021  1. Patient will move from supine to sit and sit to supine , scoot up and down, and roll side to side in bed with independence within 7 day(s). 2.  Patient will transfer from bed to chair and chair to bed with modified independence using the least restrictive device within 7 day(s). 3.  Patient will perform sit to stand with modified independence within 7 day(s). 4.  Patient will ambulate with modified independence for 300 feet with the least restrictive device within 7 day(s). 5.  Patient will ascend/descend 4 stairs with one handrail(s) with supervision/set-up within 7 day(s). Outcome: Progressing Towards Goal   PHYSICAL THERAPY REEVALUATION  Patient: Selin Hurst  (77 y.o. male)  Date: 12/27/2021  Primary Diagnosis: Cholecystitis [K81.9]  Procedure(s) (LRB):  CHOLECYSTECTOMY LAPAROSCOPIC with choloangiogram (N/A) 1 Day Post-Op   Precautions:   Fall      ASSESSMENT  Based on the objective data described below, the patient presents with decreased strength/endurance post-op day 1 laparoscopic cholecystectomy with cholangiogram. He is received seated EOB with wife present. Patient demonstrates good static and dynamic sitting balance. He demonstrates the needs for SBA for sit<>stand transfers. Patient ambulates a full lap around the unit with good gait speed and no LOB. IV pole is used for support. Current Level of Function Impacting Discharge (mobility/balance): SBA- Mod I     Functional Outcome Measure: The patient scored 20/28 on the Tinetti outcome measure. Other factors to consider for discharge: medical stability     Patient will benefit from skilled therapy intervention to address the above noted impairments. PLAN :  Recommendations and Planned Interventions: gait training, therapeutic exercises, neuromuscular re-education, edema management/control, patient and family training/education, and therapeutic activities      Frequency/Duration: Patient will be followed by physical therapy:  3 times a week to address goals. Recommendation for discharge: (in order for the patient to meet his/her long term goals)  No skilled physical therapy/ follow up rehabilitation needs identified at this time. This discharge recommendation:  Has been made in collaboration with the attending provider and/or case management    Equipment recommendations for successful discharge (if) home: patient owns DME required for discharge         SUBJECTIVE:   Patient stated i'm ready to get out of here.     OBJECTIVE DATA SUMMARY:   HISTORY:    Past Medical History:   Diagnosis Date    Arthritis     CAD (coronary artery disease) 1998    5 stents    Cancer ([facility] [address] 2020    squamous cell, basal cell, melanoma    Coagulation disorder (HCC)     blood clotting d/o from taking naproxen    Hypertension     Lupus (HCC)     PAD (peripheral artery disease) ([facility] [address]     stents: aorta & lower extremities    Thromboembolus ([facility] [address]      Past Surgical History:   Procedure Laterality Date    HX CATARACT REMOVAL Bilateral 2010    HX OTHER SURGICAL      multiple spots of skin cancer removal    [facility]    stents placed    VASCULAR SURGERY PROCEDURE UNLIST  2017    IVC filter     Hospital course since last seen and reason for reevaluation: post-op day 1 CHOLECYSTECTOMY LAPAROSCOPIC with choloangiogram     Personal factors and/or comorbidities impacting plan of care: please see above    Home Situation  Home Environment: Private residence  # Steps to Enter: 4  Rails to Enter: Yes (rail on backdoor )  One/Two Story Residence: One story  Living Alone: No  Support Systems: Spouse/Significant Other  Patient Expects to be Discharged to[de-identified] [employer]  Current DME Used/Available at Home: Shower chair,Walker, rolling,Cane, straight  Tub or Shower Type: Shower    EXAMINATION/PRESENTATION/DECISION MAKING:   Critical Behavior:  Neurologic State: Alert,Appropriate for age  Orientation Level: Oriented X4  Cognition: Appropriate decision making,Appropriate for age attention/concentration,Appropriate safety awareness  Safety/Judgement: Awareness of environment  Hearing: Auditory  Auditory Impairment: Hard of hearing, bilateral  Skin:    Edema:   Range Of Motion:  AROM: Within functional limits                       Strength:    Strength: Within functional limits                    Tone & Sensation:   Tone: Normal                              Coordination:  Coordination: Grossly decreased, non-functional  Vision:      Functional Mobility:  Bed Mobility:     Supine to Sit: Independent  Sit to Supine: Independent  Scooting: Modified independent  Transfers:  Sit to Stand: Modified independent  Stand to Sit: Modified independent                       Balance:   Sitting: Intact; Without support  Standing: Impaired; Without support  Standing - Static: Good  Standing - Dynamic : Good;Fair  Ambulation/Gait Training:  Distance (ft): 300 Feet (ft)  Assistive Device: Walker, rolling (pushing IV pole)  Ambulation - Level of Assistance: Contact guard assistance (pushing IV pole)                                                Stairs: Therapeutic Exercises:       Functional Measure:  Tinetti test:    Sitting Balance: 1  Arises: 1  Attempts to Rise: 2  Immediate Standing Balance: 2  Standing Balance: 1  Nudged: 1  Eyes Closed: 0  Turn 360 Degrees - Continuous/Discontinuous: 1  Turn 360 Degrees - Steady/Unsteady: 1  Sitting Down: 1  Balance Score: 11 Balance total score  Indication of Gait: 1  R Step Length/Height: 1  L Step Length/Height: 1  R Foot Clearance: 1  L Foot Clearance: 1  Step Symmetry: 1  Step Continuity: 1  Path: 1  Trunk: 0  Walking Time: 1  Gait Score: 9 Gait total score  Total Score: 20/28 Overall total score         Tinetti Tool Score Risk of Falls  <19 = High Fall Risk  19-24 = Moderate Fall Risk  25-28 = Low Fall Risk  Tinetti ME. Performance-Oriented Assessment of Mobility Problems in Elderly Patients. Marx 66; [id number]. (Scoring Description: PT Bulletin Feb. 10, 1993)    Older adults: Meseret Eubanks et al, 2009; n = 1000 [facility] elderly evaluated with ABC, TIM, ADL, and IADL)  · Mean TIM score for males aged 69-68 years = 26.21(3.40)  · Mean TIM score for females age 69-68 years = 25.16(4.30)  · Mean TIM score for males over 80 years = 23.29(6.02)  · Mean TIM score for females over 80 years = 17.20(8.32)              Pain Rating:      Activity Tolerance:   Good    After treatment patient left in no apparent distress:   Call bell within reach, Caregiver / family present, and seated EOB    COMMUNICATION/EDUCATION:   The patients plan of care was discussed with: Registered nurse. Fall prevention education was provided and the patient/caregiver indicated understanding., Patient/family have participated as able in goal setting and plan of care. , and Patient/family agree to work toward stated goals and plan of care.     Thank you for this referral.  Rigo Jackson, PT   Time Calculation: 11 mins

## 2021-12-27 NOTE — PROGRESS NOTES
[address] [facility] Adult  Hospitalist Group                                                                                          Hospitalist Progress Note  Ruben Molina MD  Answering service: [phone] from in house phone        Date of Service:  2021  NAME:  Nilesh Hernández. :  1940  MRN:  [id number]      Admission Summary:     Patient presents with abdominal pain and admitted for acute cholecystitis. Patient also with gallstone pancreatitis. Patient evaluated by GI, s/p ERCP  with biliary sphincterotomy and biliary stone removal.  Abdominal pain is improving and lipase levels improving. Plan for general surgery to do cholecystectomy but significant hypoglycemia this a.m. but patient asymptomatic. Interval history / Subjective:       Overall doing well, no abdominal pain, tolerating diet.      Assessment & Plan:     Gallstone pancreatitis  -GI evaluated the patient, s/p ERCP  with biliary sphincterotomy and biliary stone removal  -Intra operative cholangiogram  reveals stone in distal CBD  -GI to reevaluate    Acute cholecystitis  -Patient evaluated by general surgery  -Patient still with elevated bilirubin likely due to swelling with a sphincterotomy and dilatation was done  -S/p lap trevor , patient is clinically improving    Hypoglycemia  -Doubt that sepsis or adrenal insufficiency is playing a role, blood cultures so far negative  -Overall improving  -Discontinue D5 if no other procedures planned    Elevated troponin due to demand ischemia  -Cardiology previously evaluated patient  -Echo shows EF of 45 to 50%, wall motion abnormalities consistent with prior coronary artery disease history  -Cardiology previously evaluated the patient    Hypertension  -Continue hydrochlorothiazide, lisinopril, metoprolol    SLE  -Patient has been off of prednisone but restarted on   -Otherwise stable    Thrombocytopenia  -Looks chronic    Dyslipidemia  -Continue Lipitor    Code status: Full  DVT prophylaxis: SCDs    Care Plan discussed with: Patient/Family  Anticipated Disposition: Home w/Family  Anticipated Discharge: 24 hours to 48 hours     Hospital Problems  Date Reviewed: 9/3/2013          Codes Class Noted POA    Cholecystitis ICD-10-CM: K81.9  ICD-9-CM: 575.10  12/21/2021 Unknown                Review of Systems:   A comprehensive review of systems was negative except for that written in the HPI. Vital Signs:    Last 24hrs VS reviewed since prior progress note. Most recent are:  Visit Vitals  BP (!) 169/81   Pulse (!) 59   Temp 97.4 °F (36.3 °C)   Resp 16   Ht 5' 7\" (1.702 m)   Wt 66.5 kg (146 lb 9.6 oz)   SpO2 98%   BMI 22.96 kg/m²         Intake/Output Summary (Last 24 hours) at 12/27/2021 1248  Last data filed at 12/27/2021 9482  Gross per 24 hour   Intake 750 ml   Output 1250 ml   Net -500 ml        Physical Examination:     I had a face to face encounter with this patient and independently examined them on 12/27/2021 as outlined below:          Constitutional:  No acute distress, cooperative, pleasant    ENT:  Oral mucosa moist, oropharynx benign. Resp:  CTA bilaterally. No wheezing/rhonchi/rales. No accessory muscle use   CV:  Regular rhythm, normal rate, no murmurs, gallops, rubs    GI:  Soft, non distended, non tender. normoactive bowel sounds, no hepatosplenomegaly     Musculoskeletal:  No edema, warm, 2+ pulses throughout    Neurologic:  Moves all extremities.   AAOx3, CN II-XII reviewed            Data Review:    Review and/or order of clinical lab test      Labs:     Recent Labs     12/27/21  0124 12/26/21  0439   WBC 5.2 2.3*   HGB 9.2* 8.2*   HCT 27.9* 24.5*   PLT 66* 57*     Recent Labs     12/27/21  0820 12/26/21  0439 12/25/21  0404    137 136   K 2.8* 3.4* 3.3*    106 106   CO2 27 26 23   BUN 10 7 7   CREA 0.89 0.80 0.71    75 78   CA 8.8 8.7 8.4*     Recent Labs     12/27/21  0820 12/26/21  0439 12/25/21  0404   * 139* 151*   * 380* 398*   TBILI 1.1* 1.2* 1.2*   TP 6.4 5.2* 5.0*   ALB 2.9* 2.4* 2.2*   GLOB 3.5 2.8 2.8   LPSE  --   --  301     No results for input(s): INR, PTP, APTT, INREXT, INREXT in the last 72 hours. No results for input(s): FE, TIBC, PSAT, FERR in the last 72 hours. No results found for: FOL, RBCF   No results for input(s): PH, PCO2, PO2 in the last 72 hours. No results for input(s): CPK, CKNDX, TROIQ in the last 72 hours.     No lab exists for component: CPKMB  Lab Results   Component Value Date/Time    Cholesterol, total 181 08/23/2012 09:06 AM    HDL Cholesterol 58 08/23/2012 09:06 AM    LDL, calculated 103 (H) 08/23/2012 09:06 AM    Triglyceride 98 08/23/2012 09:06 AM     Lab Results   Component Value Date/Time    Glucose (POC) 184 (H) 12/26/2021 04:10 PM    Glucose (POC) 86 12/26/2021 06:37 AM    Glucose (POC) 102 12/26/2021 12:06 AM    Glucose (POC) 72 12/25/2021 04:16 PM    Glucose (POC) 96 12/25/2021 11:27 AM     No results found for: COLOR, APPRN, SPGRU, REFSG, MITZI, PROTU, GLUCU, KETU, BILU, UROU, MARCELINO, LEUKU, GLUKE, EPSU, BACTU, WBCU, RBCU, CASTS, UCRY      Medications Reviewed:     Current Facility-Administered Medications   Medication Dose Route Frequency    sodium chloride (NS) flush 5-40 mL  5-40 mL IntraVENous Q8H    sodium chloride (NS) flush 5-40 mL  5-40 mL IntraVENous PRN    morphine injection 2-4 mg  2-4 mg IntraVENous Q2H PRN    0.9% sodium chloride infusion 250 mL  250 mL IntraVENous PRN    0.9% sodium chloride infusion 250 mL  250 mL IntraVENous PRN    acetaminophen (TYLENOL) tablet 1,000 mg  1,000 mg Oral Q6H PRN    oxyCODONE IR (ROXICODONE) tablet 5 mg  5 mg Oral Q4H PRN    oxyCODONE IR (ROXICODONE) tablet 10 mg  10 mg Oral Q4H PRN    dextrose 5% and 0.9% NaCl infusion  50 mL/hr IntraVENous CONTINUOUS    sodium chloride (NS) flush 5-40 mL  5-40 mL IntraVENous Q8H    sodium chloride (NS) flush 5-40 mL  5-40 mL IntraVENous PRN    atorvastatin (LIPITOR) tablet 40 mg  40 mg Oral QPM    hydroCHLOROthiazide (HYDRODIURIL) tablet 12.5 mg  12.5 mg Oral DAILY    lisinopriL (PRINIVIL, ZESTRIL) tablet 10 mg  10 mg Oral QPM    hydrALAZINE (APRESOLINE) 20 mg/mL injection 10 mg  10 mg IntraVENous Q6H PRN    sodium chloride (NS) flush 5-40 mL  5-40 mL IntraVENous Q8H    sodium chloride (NS) flush 5-40 mL  5-40 mL IntraVENous PRN    gabapentin (NEURONTIN) capsule 300 mg  300 mg Oral BID    hydrOXYchloroQUINE (PLAQUENIL) tablet 200 mg  200 mg Oral BID    predniSONE (DELTASONE) tablet 2 mg  2 mg Oral DAILY WITH BREAKFAST    [Held by provider] aspirin delayed-release tablet 81 mg  81 mg Oral DAILY    metoprolol succinate (TOPROL-XL) XL tablet 25 mg  25 mg Oral DAILY     ______________________________________________________________________  EXPECTED LENGTH OF STAY: 4d 9h  ACTUAL LENGTH OF STAY:          7                 Ludy Hewitt MD

## 2021-12-27 NOTE — PROGRESS NOTES
[facility]  [address] [facility] [address], [address]   [phone]                     GI PROGRESS NOTE    Patient Name: Selin Harris. : 1940      MRN: [id number]  Admit Date: 2021  Today's Date: 2021    Subjective:     Patient underwent laparoscopic cholecystectomy with [facility]. Gallbladder normal but I will see positive of 4-5 medium sized stones in distal CBD with duodenal filling. States he is doing well today. His [place] regular meal with node difficulty, nausea, vomiting. Has been ambulating. Appropriate abdominal discomfort. No BM post surgery. Objective:     Blood pressure (!) 169/81, pulse (!) 59, temperature 97.4 °F (36.3 °C), resp. rate 16, height 5' 7\" (1.702 m), weight 66.5 kg (146 lb 9.6 oz), SpO2 98 %. Physical Exam:  General appearance: cooperative, no distress, appears stated age  Skin: no jaundice. Scattered scabbed/bruises on extremities. Lap incisions -CDI, eccymotic  HEENT: Sclerae icteric. Left facial droop, right upper lip bruised  Cardiovascular: Regular rate and rhythm. +murmur,   Respiratory: Comfortable breathing with no accessory muscle use. GI: Abdomen nondistended, soft, and non tender Normal active bowel sounds. Neurological: Patient is alert and oriented. Psychiatric: Mood  appropriate with good judgement.   No anxiety or agitation      Data Review:    Recent Results (from the past 24 hour(s))   GLUCOSE, POC    Collection Time: 21  4:10 PM   Result Value Ref Range    Glucose (POC) 184 (H) 65 - 117 mg/dL    Performed by Chriss Drummond  PCT    CBC WITH AUTOMATED DIFF    Collection Time: 21  1:24 AM   Result Value Ref Range    WBC 5.2 4.1 - 11.1 K/uL    RBC 2.84 (L) 4.10 - 5.70 M/uL    HGB 9.2 (L) 12.1 - 17.0 g/dL    HCT 27.9 (L) 36.6 - 50.3 %    MCV 98.2 80.0 - 99.0 FL    MCH 32.4 26.0 - 34.0 PG    MCHC 33.0 30.0 - 36.5 g/dL    RDW 18.6 (H) 11.5 - 14.5 %    PLATELET 66 (L) 696 - 400 K/uL    MPV 12.3 8.9 - 12.9 FL    NRBC 0.0 0  WBC    ABSOLUTE NRBC 0.00 0.00 - 0.01 K/uL    NEUTROPHILS 87 (H) 32 - 75 %    LYMPHOCYTES 4 (L) 12 - 49 %    MONOCYTES 8 5 - 13 %    EOSINOPHILS 0 0 - 7 %    BASOPHILS 0 0 - 1 %    IMMATURE GRANULOCYTES 1 (H) 0.0 - 0.5 %    ABS. NEUTROPHILS 4.5 1.8 - 8.0 K/UL    ABS. LYMPHOCYTES 0.2 (L) 0.8 - 3.5 K/UL    ABS. MONOCYTES 0.4 0.0 - 1.0 K/UL    ABS. EOSINOPHILS 0.0 0.0 - 0.4 K/UL    ABS. BASOPHILS 0.0 0.0 - 0.1 K/UL    ABS. IMM. GRANS. 0.1 (H) 0.00 - 0.04 K/UL    DF SMEAR SCANNED      PLATELET COMMENTS Large Platelets      RBC COMMENTS ANISOCYTOSIS  1+        RBC COMMENTS MACROCYTOSIS  1+        RBC COMMENTS OVALOCYTES  PRESENT        RBC COMMENTS MICHAEL CELLS  PRESENT       METABOLIC PANEL, COMPREHENSIVE    Collection Time: 12/27/21  8:20 AM   Result Value Ref Range    Sodium 137 136 - 145 mmol/L    Potassium 2.8 (L) 3.5 - 5.1 mmol/L    Chloride 104 97 - 108 mmol/L    CO2 27 21 - 32 mmol/L    Anion gap 6 5 - 15 mmol/L    Glucose 100 65 - 100 mg/dL    BUN 10 6 - 20 MG/DL    Creatinine 0.89 0.70 - 1.30 MG/DL    BUN/Creatinine ratio 11 (L) 12 - 20      GFR est AA >60 >60 ml/min/1.73m2    GFR est non-AA >60 >60 ml/min/1.73m2    Calcium 8.8 8.5 - 10.1 MG/DL    Bilirubin, total 1.1 (H) 0.2 - 1.0 MG/DL    ALT (SGPT) 144 (H) 12 - 78 U/L    AST (SGOT) 66 (H) 15 - 37 U/L    Alk. phosphatase 417 (H) 45 - 117 U/L    Protein, total 6.4 6.4 - 8.2 g/dL    Albumin 2.9 (L) 3.5 - 5.0 g/dL    Globulin 3.5 2.0 - 4.0 g/dL    A-G Ratio 0.8 (L) 1.1 - 2.2     SAMPLES BEING HELD    Collection Time: 12/27/21  8:20 AM   Result Value Ref Range    SAMPLES BEING HELD 1LAV     COMMENT        Add-on orders for these samples will be processed based on acceptable specimen integrity and analyte stability, which may vary by analyte.    POTASSIUM    Collection Time: 12/27/21  1:25 PM   Result Value Ref Range    Potassium 3.4 (L) 3.5 - 5.1 mmol/L         Assessment / Plan :     Abdominal pain   Cholecystitis Thrombocytopenia  Gallstone pancreatitis    71yo male withPt has a history of chronic thrombocytopenia (baseline plt 50-60k), 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 Presented to OSH with post prandial RUQ and epigastric pain that would come and go over the past 3 weeks. Episode chills, rigors, nausea & 2 episodes of non bloody emesis reported fever of 103. LFTs at [facility] notable for T bili 5.1 --> 2.6. Suspect stone has passed, mild associated gallstone pancreatitis, though as MRCP is contraindicated reasonable to start w EUS tomorrow to ensure duct is clear and can then proceed w CCK. If EUS is positive will move forward w ERCP. Trend LFTs, INR (will need to be less than 1.5). CT ABD/Pelvis 12/20/21 (OSH): revealed distended gallbladder, mild intrahepatic biliary dilation, no calcified stones. US ABD LTM (OSH): CBD dilated 11mm, gallbladder distention with some fluid. GB wall thickness 5mm suspicious for cholecystitis. ERCP 12/22/21: biliary sphincterotomy, biliary stone removal   Labs (12/23/21)  TB 2.2 , ,   Lipase 1098    Lap trevor 12/26/21: Non inflamed gallbladder with positive IOC with 4-5 medium sized stones in distal CBD with duodenal filling. Dilated ducts. Labs today: T bili 1.1  AST 66 alk phos 417    No laboratory/clinical suggestion of biliary obstruction. Per Dr. Lamont Bumpers, sphincterotomy 12/22 adequate to allow for stones seen on IOC to pass. We will continue to monitor for up trend of LFTs, abdominal pain, jaundice.   Will schedule for ERCP if any of these occur.   - Continue diet, IVF, abx per surgery   - trend TB/LFT's  DO NOT trend lipase         Patient Active Hospital Problem List:   Active Problems:    Cholecystitis (12/21/2021)        Alonso Wilks NP  ATTENDING ADDENDUM OF TASIA NOTE:  I saw and evaluated Shantell HernandezYuki on the above date of service and discussed the care with the nurse practitioner. I agree with the findings and plan as documented in the note above and have edited it to reflect my findings and plan. Elderly male with history of CAD, oral cancer, SLE admitted on 12/21 for gallstone pancreatitis and acute cholecystitis. Underwent ERCP on  12/22 with gallstone extraction and sphincterotomy. Lap trevor on 12/26 successful but had retained stones on IOC. Cholestasis markers improving, and sphincterotomy was wide enough to accommodate passage of subcentimeter biliary stones. Will continue to monitor transaminases and bilirubin for the next 24-48 hours.

## 2021-12-28 VITALS
HEIGHT: 67 IN | RESPIRATION RATE: 16 BRPM | TEMPERATURE: 98.5 F | HEART RATE: 60 BPM | WEIGHT: 146.6 LBS | DIASTOLIC BLOOD PRESSURE: 72 MMHG | OXYGEN SATURATION: 97 % | BODY MASS INDEX: 23.01 KG/M2 | SYSTOLIC BLOOD PRESSURE: 152 MMHG

## 2021-12-28 LAB
ALBUMIN SERPL-MCNC: 2.4 G/DL (ref 3.5–5)
ALBUMIN/GLOB SERPL: 0.9 {RATIO} (ref 1.1–2.2)
ALP SERPL-CCNC: 326 U/L (ref 45–117)
ALT SERPL-CCNC: 106 U/L (ref 12–78)
ANION GAP SERPL CALC-SCNC: 4 MMOL/L (ref 5–15)
AST SERPL-CCNC: 50 U/L (ref 15–37)
BACTERIA SPEC CULT: NORMAL
BILIRUB SERPL-MCNC: 0.9 MG/DL (ref 0.2–1)
BUN SERPL-MCNC: 9 MG/DL (ref 6–20)
BUN/CREAT SERPL: 14 (ref 12–20)
CALCIUM SERPL-MCNC: 8.3 MG/DL (ref 8.5–10.1)
CHLORIDE SERPL-SCNC: 109 MMOL/L (ref 97–108)
CO2 SERPL-SCNC: 24 MMOL/L (ref 21–32)
COMMENT, HOLDF: NORMAL
CREAT SERPL-MCNC: 0.65 MG/DL (ref 0.7–1.3)
GLOBULIN SER CALC-MCNC: 2.8 G/DL (ref 2–4)
GLUCOSE BLD STRIP.AUTO-MCNC: 87 MG/DL (ref 65–117)
GLUCOSE BLD STRIP.AUTO-MCNC: 90 MG/DL (ref 65–117)
GLUCOSE BLD STRIP.AUTO-MCNC: 97 MG/DL (ref 65–117)
GLUCOSE SERPL-MCNC: 80 MG/DL (ref 65–100)
LIPASE SERPL-CCNC: 248 U/L (ref 73–393)
POTASSIUM SERPL-SCNC: 3.9 MMOL/L (ref 3.5–5.1)
PROT SERPL-MCNC: 5.2 G/DL (ref 6.4–8.2)
SAMPLES BEING HELD,HOLD: NORMAL
SERVICE CMNT-IMP: NORMAL
SODIUM SERPL-SCNC: 137 MMOL/L (ref 136–145)

## 2021-12-28 PROCEDURE — 74011250637 HC RX REV CODE- 250/637: Performed by: SURGERY

## 2021-12-28 PROCEDURE — 74011636637 HC RX REV CODE- 636/637: Performed by: SURGERY

## 2021-12-28 PROCEDURE — 80053 COMPREHEN METABOLIC PANEL: CPT

## 2021-12-28 PROCEDURE — 99024 POSTOP FOLLOW-UP VISIT: CPT | Performed by: SURGERY

## 2021-12-28 PROCEDURE — 82962 GLUCOSE BLOOD TEST: CPT

## 2021-12-28 PROCEDURE — 36415 COLL VENOUS BLD VENIPUNCTURE: CPT

## 2021-12-28 PROCEDURE — 83690 ASSAY OF LIPASE: CPT

## 2021-12-28 RX ORDER — OXYCODONE HYDROCHLORIDE 5 MG/1
5 TABLET ORAL
Qty: 15 TABLET | Refills: 0 | Status: SHIPPED | OUTPATIENT
Start: 2021-12-28 | End: 2021-12-31

## 2021-12-28 RX ADMIN — HYDROXYCHLOROQUINE SULFATE 200 MG: 200 TABLET ORAL at 08:32

## 2021-12-28 RX ADMIN — ACETAMINOPHEN 1000 MG: 500 TABLET ORAL at 08:32

## 2021-12-28 RX ADMIN — METOPROLOL SUCCINATE 25 MG: 25 TABLET, FILM COATED, EXTENDED RELEASE ORAL at 08:31

## 2021-12-28 RX ADMIN — ACETAMINOPHEN 1000 MG: 500 TABLET ORAL at 02:21

## 2021-12-28 RX ADMIN — HYDROCHLOROTHIAZIDE 12.5 MG: 25 TABLET ORAL at 08:32

## 2021-12-28 RX ADMIN — GABAPENTIN 300 MG: 300 CAPSULE ORAL at 08:32

## 2021-12-28 RX ADMIN — PREDNISONE 2 MG: 1 TABLET ORAL at 07:56

## 2021-12-28 NOTE — DISCHARGE SUMMARY
Discharge Summary       PATIENT ID: Sherry Brewer MRN: [id number]   YOB: 1940    DATE OF ADMISSION: 12/20/2021 10:31 PM    DATE OF DISCHARGE: 12/28/2021   PRIMARY CARE PROVIDER: Amari Ballard MD     ATTENDING PHYSICIAN: Thor Meigs  DISCHARGING PROVIDER: Dago Holcomb MD    To contact this individual call [phone] and ask the  to page. If unavailable ask to be transferred the Adult Hospitalist Department. CONSULTATIONS: IP CONSULT TO GASTROENTEROLOGY  IP CONSULT TO GENERAL SURGERY  IP CONSULT TO CARDIOLOGY    PROCEDURES/SURGERIES: Procedure(s):  CHOLECYSTECTOMY LAPAROSCOPIC with choloangiogram    [address] COURSE:     HPI  Sherry Brewer is a 80 y.o. male with ap mhx CAD, oral cancer, hypertension, and lupus who presents as a transfer for abdominal pain, and is being admitted for cholecystitis. He states that he has been experiencing right upper quadrant pain intermittently for the past 3 weeks with some improvement with Tums. The day prior to admission, he started to develop chills, and alteration in his mental status so was taken to the ED for evaluation. In the [facility] ED, he was found to have a CT finding of CBD dilated to 11 mm, pericholecystic fluid, and thickened gallbladder wall. He was treated with Zosyn and sent to [facility] for further care. Hospital Course  Patient presents with abdominal pain, further work-up shows that patient with gallstone pancreatitis as well as acute cholecystitis. Patient had initial work-up at [facility] ED, CT abdomen and pelvis done in the ED shows CBD dilatation up to 11 mm, pericholecystic fluid and thickened gallbladder wall. Patient was evaluated by GI as well as general surgery. Patient was started on empiric Zosyn. Patient went for ERCP 12/22 with biliary sphincterectomy and biliary stones removal.  Patient also went for laparoscopic cholecystectomy 12/26.   It was noted that intraoperative cholangiogram done on 12/26 reveals some residual stones in the distal CBD. GI reevaluated the patient and the impression was sphincterotomy was wide enough to accommodate passage of subcentimeter biliary stones. No repeat ERCP needed at this time. Patient is also tolerated diet well without abdominal symptoms. Also patient cholestasis markers have consistently improved. GI recommended no further work-up. Patient to follow-up with general surgery in 2 weeks as recommended. Patient was previously taking Coumadin chronically prior to presentation, for his clotting disorder. Patient was resumed on anticoagulation with Coumadin for as general surgery cleared. Patient will need follow-up INR in 2 days with PCP for reevaluation. DISCHARGE DIAGNOSES / PLAN:      1. Gallstone pancreatitis  2. Acute cholecystitis  3. Hypoglycemia  4. Elevated troponin due to demand ischemia  5. Hypertension  6. SLE  7. Thrombocytopenia  8. Dyslipidemia     ADDITIONAL CARE RECOMMENDATIONS:     Follow-up with general surgery in 2 weeks. PENDING TEST RESULTS:   At the time of discharge the following test results are still pending: None    FOLLOW UP APPOINTMENTS:    Follow-up Information     Follow up With Specialties Details Why [address], [address], MD [facility]   [address] [place] [address]  [phone]               DIET: Cardiac Diet  Oral Nutritional Supplements: No Oral Supplement prescribed    ACTIVITY: Activity as tolerated    WOUND CARE: None    EQUIPMENT needed: None      DISCHARGE MEDICATIONS:  Current Discharge Medication List      START taking these medications    Details   oxyCODONE IR (ROXICODONE) 5 mg immediate release tablet Take 1 Tablet by mouth every four (4) hours as needed for Pain for up to 3 days.  Max Daily Amount: 30 mg.  Qty: 15 Tablet, Refills: 0  Start date: 12/28/2021, End date: 12/31/2021    Associated Diagnoses: Choledocholithiasis         CONTINUE these medications which have NOT CHANGED    Details   allopurinoL (ZYLOPRIM) 300 mg tablet Take 300 mg by mouth daily. aspirin delayed-release 81 mg tablet Take 81 mg by mouth daily. calcium-vitamin D 600 mg-5 mcg (200 unit) tab Take 1 Tablet by mouth every evening. cholecalciferol (VITAMIN D3) (1000 Units /25 mcg) tablet Take 1,000 Units by mouth daily. gabapentin (NEURONTIN) 300 mg capsule Take 300 mg by mouth two (2) times a day. hydroCHLOROthiazide (HYDRODIURIL) 12.5 mg tablet Take 12.5 mg by mouth daily. latanoprost (XALATAN) 0.005 % ophthalmic solution Administer 1 Drop to both eyes nightly. lisinopriL (PRINIVIL, ZESTRIL) 10 mg tablet Take 10 mg by mouth every evening. omeprazole (PRILOSEC) 40 mg capsule Take 40 mg by mouth Daily (before lunch). predniSONE (DELTASONE) 1 mg tablet Take 2 mg by mouth daily. ferrous fumarate/vit Bcomp,C (SUPER B COMPLEX PO) Take 1 Tablet by mouth daily. acetaminophen (TYLENOL) 500 mg tablet Take 500 mg by mouth every six (6) hours as needed for Pain.      warfarin (COUMADIN) 2 mg tablet Receives 2 mg tablets. Usual dose has been 4 mg every evening, but notes that recently increased to alternating 4 and 6 mg every other day. metoprolol succinate (TOPROL-XL) 25 mg XL tablet Take 25 mg by mouth daily. polyvinyl alcohol (LIQUIFILM TEARS) 1.4 % ophthalmic solution Administer 1 Drop to left eye two (2) times a day. (generic dry eye drops)      hydrOXYchloroQUINE (PLAQUENIL) 200 mg tablet Take 200 mg by mouth two (2) times a day. potassium chloride SR (KLOR-CON 10) 10 mEq tablet Take 10 mEq by mouth every morning. atorvastatin (LIPITOR) 40 mg tablet Take 40 mg by mouth every evening. NOTIFY YOUR PHYSICIAN FOR ANY OF THE FOLLOWING:   Fever over 101 degrees for 24 hours. Chest pain, shortness of breath, fever, chills, nausea, vomiting, diarrhea, change in mentation, falling, weakness, bleeding. Severe pain or pain not relieved by medications. Or, any other signs or symptoms that you may have questions about. DISPOSITION:    Home With:   OT  PT  HH  RN       Long term SNF/Inpatient Rehab    Independent/assisted living    Hospice    Other:       PATIENT CONDITION AT DISCHARGE:     Functional status    Poor     Deconditioned     Independent      Cognition     Lucid     Forgetful     Dementia      Catheters/lines (plus indication)    Yo     PICC     PEG     None      Code status     Full code     DNR      PHYSICAL EXAMINATION AT DISCHARGE:  General:          Alert, cooperative, no distress, appears stated age. HEENT:           Atraumatic, anicteric sclerae, pink conjunctivae                          No oral ulcers, mucosa moist, throat clear, dentition fair  Neck:               Supple, symmetrical  Lungs:             Clear to auscultation bilaterally. No Wheezing or Rhonchi. No rales. Chest wall:      No tenderness  No Accessory muscle use. Heart:              Regular  rhythm,  No  murmur   No edema  Abdomen:        Soft, non-tender. Not distended. Bowel sounds normal  Extremities:     No cyanosis. No clubbing,                            Skin turgor normal, Capillary refill normal  Skin:                Not pale. Not Jaundiced  No rashes   Psych:             Not anxious or agitated.   Neurologic:      Alert, moves all extremities, answers questions appropriately and responds to commands       CHRONIC MEDICAL DIAGNOSES:  Problem List as of 12/28/2021 Date Reviewed: 9/3/2013          Codes Class Noted - Resolved    Cholecystitis ICD-10-CM: K81.9  ICD-9-CM: 575.10  12/21/2021 - Present        SCC (squamous cell carcinoma), face ICD-10-CM: C44.320  ICD-9-CM: 173.32  11/12/2020 - Present        Hypertension ICD-10-CM: I10  ICD-9-CM: 401.9  8/23/2012 - Present        Rhinitis ICD-10-CM: J31.0  ICD-9-CM: 472.0  8/23/2012 - Present        CAD (coronary artery disease) ICD-10-CM: I25.10  ICD-9-CM: 414.00 8/23/2012 - Present        Gout ICD-10-CM: M10.9  ICD-9-CM: 274.9  8/23/2012 - Present        Hyperlipidemia ICD-10-CM: E78.5  ICD-9-CM: 272.4  8/23/2012 - Present              Greater than 60 minutes were spent with the patient on counseling and coordination of care    Signed:   Yelena Mcdaniel MD  12/28/2021  11:35 AM

## 2021-12-28 NOTE — ROUTINE PROCESS
Bedside and Verbal shift change report given to Jonel Haines RN (oncoming nurse) by Vic Huerta RN (offgoing nurse). Report included the following information SBAR, Kardex, Intake/Output, MAR and Recent Results.

## 2021-12-28 NOTE — ROUTINE PROCESS
I have read and agree with the charting of [facility], [address]

## 2021-12-28 NOTE — PROGRESS NOTES
Bedside and Verbal shift change report given to [employer] (oncoming nurse) by Frankie Hauser (offgoing nurse). Report included the following information SBAR and Kardex.

## 2021-12-28 NOTE — PROGRESS NOTES
Kalpana Duke. Texie Sacks, MD  Office [phone]     Gastroenterology Progress Note    December 28, 2021  Admit Date: 12/20/2021         Narrative Assessment and Plan   Elderly male with history of CAD, oral cancer, SLE admitted on 12/21 for gallstone pancreatitis and acute cholecystitis. Underwent ERCP on  12/22 with gallstone extraction and sphincterotomy. Lap trevor on 12/26 successful but had retained stones on IOC. Cholestasis markers have consistently improved as sphincterotomy was wide enough to accommodate passage of subcentimeter biliary stones. -no repeat ERCP needed at this time  -if tolerating diet well without abdominal symptoms, can be discharged from acute care from GI standpoint with outpt f/u  -defer discharge recs to gen sgy  -GI will sign off    Problem list:  Abdominal pain   Cholecystitis   Thrombocytopenia  Gallstone pancreatitis    Subjective:   · Having trouble eating diet due to regular consistency (has chronic oropharyngeal dysphagia). Denies any abdominal pain or n/v.      ROS:  The previous review of systems on initial consultation / H&P is noted and reviewed. Specific changes noted above in HPI.     Current Medications:     Current Facility-Administered Medications   Medication Dose Route Frequency    sodium chloride (NS) flush 5-40 mL  5-40 mL IntraVENous Q8H    sodium chloride (NS) flush 5-40 mL  5-40 mL IntraVENous PRN    morphine injection 2-4 mg  2-4 mg IntraVENous Q2H PRN    0.9% sodium chloride infusion 250 mL  250 mL IntraVENous PRN    0.9% sodium chloride infusion 250 mL  250 mL IntraVENous PRN    acetaminophen (TYLENOL) tablet 1,000 mg  1,000 mg Oral Q6H PRN    oxyCODONE IR (ROXICODONE) tablet 5 mg  5 mg Oral Q4H PRN    oxyCODONE IR (ROXICODONE) tablet 10 mg  10 mg Oral Q4H PRN    dextrose 5% and 0.9% NaCl infusion  50 mL/hr IntraVENous CONTINUOUS    sodium chloride (NS) flush 5-40 mL  5-40 mL IntraVENous Q8H    sodium chloride (NS) flush 5-40 mL  5-40 mL IntraVENous PRN    atorvastatin (LIPITOR) tablet 40 mg  40 mg Oral QPM    hydroCHLOROthiazide (HYDRODIURIL) tablet 12.5 mg  12.5 mg Oral DAILY    lisinopriL (PRINIVIL, ZESTRIL) tablet 10 mg  10 mg Oral QPM    hydrALAZINE (APRESOLINE) 20 mg/mL injection 10 mg  10 mg IntraVENous Q6H PRN    sodium chloride (NS) flush 5-40 mL  5-40 mL IntraVENous Q8H    sodium chloride (NS) flush 5-40 mL  5-40 mL IntraVENous PRN    gabapentin (NEURONTIN) capsule 300 mg  300 mg Oral BID    hydrOXYchloroQUINE (PLAQUENIL) tablet 200 mg  200 mg Oral BID    predniSONE (DELTASONE) tablet 2 mg  2 mg Oral DAILY WITH BREAKFAST    [Held by provider] aspirin delayed-release tablet 81 mg  81 mg Oral DAILY    metoprolol succinate (TOPROL-XL) XL tablet 25 mg  25 mg Oral DAILY       Objective:     VITALS:   Last 24hrs VS reviewed since prior progress note. Most recent are:  Visit Vitals  BP (!) 152/72   Pulse 60   Temp 98.5 °F (36.9 °C)   Resp 16   Ht 5' 7\" (1.702 m)   Wt 66.5 kg (146 lb 9.6 oz)   SpO2 97%   BMI 22.96 kg/m²     Temp (24hrs), Av.8 °F (36.6 °C), Min:97.3 °F (36.3 °C), Max:98.5 °F (36.9 °C)      Intake/Output Summary (Last 24 hours) at 2021 1053  Last data filed at 2021  Gross per 24 hour   Intake 480 ml   Output 1350 ml   Net -870 ml       EXAM:  General: Comfortable, no distress    HEENT: Atraumatic skull, pupils equal; chronic asymmetric facial droop  Lungs:  No distress  Speaking in complete sentences  Abdomen:  Nondistended, nontender. No mass, guarding or rebound  Musc:   No skeletal gross defects or deformities  Neurologic:  Alert and oriented  Psych:   Good insight.  Not anxious nor agitated  Derm:  No rash, jaundice visible    Lab Data Reviewed:   Recent Labs     21  0124 21  0439   WBC 5.2 2.3*   HGB 9.2* 8.2*   HCT 27.9* 24.5*   PLT 66* 57*     Recent Labs     21  0157 21  1325 21  0820 21  0439 21  0439     --  137  --  137   K 3.9 3.4* 2.8* < > 3.4*   *  --  104  --  106   CO2 24  --  27  --  26   GLU 80  --  100  --  75   BUN 9  --  10  --  7   CREA 0.65*  --  0.89  --  0.80   CA 8.3*  --  8.8  --  8.7   ALB 2.4*  --  2.9*  --  2.4*   TBILI 0.9  --  1.1*  --  1.2*   *  --  144*  --  139*    < > = values in this interval not displayed. Lab Results   Component Value Date/Time    Glucose (POC) 87 12/28/2021 06:05 AM    Glucose (POC) 90 12/28/2021 12:36 AM    Glucose (POC) 184 (H) 12/26/2021 04:10 PM    Glucose (POC) 86 12/26/2021 06:37 AM    Glucose (POC) 102 12/26/2021 12:06 AM     No results for input(s): PH, PCO2, PO2, HCO3, FIO2 in the last 72 hours. No results for input(s): INR, INREXT in the last 72 hours.         Assessment:   (See above)  Active Problems:    Cholecystitis (12/21/2021)        Plan:   (See above)      Signed By: Homero Liriano MD     12/28/2021  10:53 AM

## 2021-12-28 NOTE — PROGRESS NOTES
Progress Note    Patient: Shantell Gibson MRN: [id number]  SSN: [id number]    YOB: 1940  Age: 80 y.o. Sex: male      Admit Date: 2021    2 Days Post-Op    Procedure:  Procedure(s):  CHOLECYSTECTOMY LAPAROSCOPIC with choloangiogram    Subjective:     Patient is tolerating diet, ambulating. He notes incisional pain which is well controlled. Objective:     Visit Vitals  BP (!) 152/72   Pulse 60   Temp 98.5 °F (36.9 °C)   Resp 16   Ht 5' 7\" (1.702 m)   Wt 146 lb 9.6 oz (66.5 kg)   SpO2 97%   BMI 22.96 kg/m²       Temp (24hrs), Av.8 °F (36.6 °C), Min:97.3 °F (36.3 °C), Max:98.5 °F (36.9 °C)      Physical Exam:    ABDOMEN: Nondistended, soft, wounds dry and intact. Data Review: VS, I/O's, Labs    Lab Review:   Recent Results (from the past 24 hour(s))   POTASSIUM    Collection Time: 21  1:25 PM   Result Value Ref Range    Potassium 3.4 (L) 3.5 - 5.1 mmol/L   GLUCOSE, POC    Collection Time: 21 12:36 AM   Result Value Ref Range    Glucose (POC) 90 65 - 117 mg/dL    Performed by [facility] [id number], COMPREHENSIVE    Collection Time: 21  1:57 AM   Result Value Ref Range    Sodium 137 136 - 145 mmol/L    Potassium 3.9 3.5 - 5.1 mmol/L    Chloride 109 (H) 97 - 108 mmol/L    CO2 24 21 - 32 mmol/L    Anion gap 4 (L) 5 - 15 mmol/L    Glucose 80 65 - 100 mg/dL    BUN 9 6 - 20 MG/DL    Creatinine 0.65 (L) 0.70 - 1.30 MG/DL    BUN/Creatinine ratio 14 12 - 20      GFR est AA >60 >60 ml/min/1.73m2    GFR est non-AA >60 >60 ml/min/1.73m2    Calcium 8.3 (L) 8.5 - 10.1 MG/DL    Bilirubin, total 0.9 0.2 - 1.0 MG/DL    ALT (SGPT) 106 (H) 12 - 78 U/L    AST (SGOT) 50 (H) 15 - 37 U/L    Alk.  phosphatase 326 (H) 45 - 117 U/L    Protein, total 5.2 (L) 6.4 - 8.2 g/dL    Albumin 2.4 (L) 3.5 - 5.0 g/dL    Globulin 2.8 2.0 - 4.0 g/dL    A-G Ratio 0.9 (L) 1.1 - 2.2     LIPASE    Collection Time: 21  1:57 AM   Result Value Ref Range    Lipase 248 73 - 393 U/L   SAMPLES BEING HELD Collection Time: 12/28/21  1:57 AM   Result Value Ref Range    SAMPLES BEING HELD 1LAV     COMMENT        Add-on orders for these samples will be processed based on acceptable specimen integrity and analyte stability, which may vary by analyte. GLUCOSE, POC    Collection Time: 12/28/21  6:05 AM   Result Value Ref Range    Glucose (POC) 87 65 - 117 mg/dL    Performed by [address] [place] [address]:     Hospital Problems  Date Reviewed: 9/3/2013          Codes Class Noted POA    Cholecystitis ICD-10-CM: K81.9  ICD-9-CM: 575.10  12/21/2021 Unknown            Liver function test continue to normalize; tolerating diet. Plan/Recommendations/Medical Decision Making:     Okay with discharge to home on low-fat diet. General surgery follow-up in 2 weeks. He will need gastroenterology follow-up as well.

## 2021-12-28 NOTE — PROGRESS NOTES
Transition of Care:  home with f/u with specialist/pcp     Transport Plan: in car with family     RUR: 13%     Main Contact: Spouse - Akua Akhtar - [phone]     CM noted discharge order    Medicare pt has received, reviewed, and signed 2nd IM letter informing them of their right to appeal the discharge. Signed copy has been placed on pt bedside chart.        NOTE: Patient is alert and oriented x4 and confirmed demographics.    Baseline: DME - none  ADLs/IDALS: Independent   Previous Home Health: N/A  Previous SNF/IPR: N/A  ER Contact: Spouse - Akua Akhtar - [phone]   Patient lives in [id number] [place] [address] with 4 steps to enter. Patient lives with his spouse. Patient is independent with ADLs/IDALs.    Patient does not utilize DMEs to ambulate. Patient's preferred pharmacy is [facility] for his perscriptions.  Patient's spouse is expected to transport at discharge.           CM following  Jose Luis Cherry RN, CRM

## 2021-12-28 NOTE — DISCHARGE INSTRUCTIONS
Patient Education   Call [phone] to schedule surgery follow-up with Dr. Familia Remy for 2 weeks after hospital discharge     Gallbladder Removal Surgery: What to Expect at [facility]  After your surgery, you will likely feel weak and tired for several days after you return home. Your belly may be swollen. If you had laparoscopic surgery, you may also have pain in your shoulder for about 24 hours. You may have gas or need to burp a lot at first. A few people get diarrhea. The diarrhea usually goes away in 2 to 4 weeks, but it may last longer. How quickly you recover depends on whether you had a laparoscopic or open surgery. · For a laparoscopic surgery, most people can go back to work or their normal routine in 1 to 2 weeks. But it may take longer, depending on the type of work you do. · For an open surgery, it will probably take 4 to 6 weeks before you get back to your normal routine. This care sheet gives you a general idea about how long it will take for you to recover. However, each person recovers at a different pace. Follow the steps below to get better as quickly as possible. How can you care for yourself at home? Activity    · Rest when you feel tired. Getting enough sleep will help you recover.     · Try to walk each day. Start out by walking a little more than you did the day before. Gradually increase the amount you walk. Walking boosts blood flow and helps prevent pneumonia and constipation.     · For about 2 to 4 weeks, avoid lifting anything that would make you strain. This may include a child, heavy grocery bags and milk containers, a heavy briefcase or backpack, cat litter or dog food bags, or a vacuum .     · Avoid strenuous activities, such as biking, jogging, weightlifting, and aerobic exercise, until your doctor says it is okay.     · You may shower 24 to 48 hours after surgery, if your doctor okays it. Pat the cut (incision) dry.  Do not take a bath for the first 2 weeks, or until your doctor tells you it is okay.     · You may drive when you are no longer taking pain medicine and can quickly move your foot from the gas pedal to the brake. You must also be able to sit comfortably for a long period of time, even if you do not plan to go far. You might get caught in traffic.     · For a laparoscopic surgery, most people can go back to work or their normal routine in 1 to 2 weeks, but it may take longer. For an open surgery, it will probably take 4 to 6 weeks before you get back to your normal routine.     · Your doctor will tell you when you can have sex again. Diet    · When you feel like eating, start with small amounts of food. Avoid eating fatty foods for about 1 month. Fatty foods include hamburger, whole milk, cheese, and many snack foods.     · Drink plenty of fluids (unless your doctor tells you not to).   · If you have diarrhea, watch to see if specific foods cause it, and stop eating them. If the diarrhea continues for more than 2 weeks, talk to your doctor.     · You may notice that your bowel movements are not regular right after your surgery. This is common. Try to avoid constipation and straining with bowel movements. You may want to take a fiber supplement every day. If you have not had a bowel movement after a couple of days, ask your doctor about taking a mild laxative. Medicines    · Your doctor will tell you if and when you can restart your medicines. He or she will also give you instructions about taking any new medicines.     · If you take aspirin or some other blood thinner, ask your doctor if and when to start taking it again. Make sure that you understand exactly what your doctor wants you to do.     · Take pain medicines exactly as directed. ? If the doctor gave you a prescription medicine for pain, take it as prescribed.   ? If you are not taking a prescription pain medicine, take an over-the-counter medicine such as acetaminophen (Tylenol), ibuprofen (Advil, Motrin), or naproxen (Aleve). Read and follow all instructions on the label. ? Do not take two or more pain medicines at the same time unless the doctor told you to. Many pain medicines contain acetaminophen, which is Tylenol. Too much Tylenol can be harmful.     · If you think your pain medicine is making you sick to your stomach:  ? Take your medicine after meals (unless your doctor tells you not to). ? Ask your doctor for a different pain medicine.     · If your doctor prescribed antibiotics, take them as directed. Do not stop taking them just because you feel better. You need to take the full course of antibiotics. Incision care    · If you have strips of tape on the incision, or cut, leave the tape on for a week or until it falls off.     · After 24 to 48 hours, wash the area daily with warm, soapy water, and pat it dry.     · You may have staples to hold the cut together. Keep them dry until your doctor takes them out. This is usually in 7 to 10 days.     · Keep the area clean and dry. You may cover it with a gauze bandage if it weeps or rubs against clothing. Change the bandage every day. Ice    · To reduce swelling and pain, put ice or a cold pack on your belly for 10 to 20 minutes at a time. Do this every 1 to 2 hours. Put a thin cloth between the ice and your skin. Follow-up care is a key part of your treatment and safety. Be sure to make and go to all appointments, and call your doctor if you are having problems. It's also a good idea to know your test results and keep a list of the medicines you take. When should you call for help? Call 911 anytime you think you may need emergency care. For example, call if:    · You passed out (lost consciousness).     · You are short of breath. .   Call your doctor now or seek immediate medical care if:    · You are sick to your stomach and cannot drink fluids.     · You have pain that does not get better when you take your pain medicine.     · You cannot pass stools or gas.     · You have signs of infection, such as:  ? Increased pain, swelling, warmth, or redness. ? Red streaks leading from the incision. ? Pus draining from the incision. ? A fever.     · Bright red blood has soaked through the bandage over your incision.     · You have loose stitches, or your incision comes open.     · You have signs of a blood clot in your leg (called a deep vein thrombosis), such as:  ? Pain in your calf, back of knee, thigh, or groin. ? Redness and swelling in your leg or groin. Watch closely for any changes in your health, and be sure to contact your doctor if you have any problems. Where can you learn more? Go to http://www.gray.com/  Enter F357 in the search box to learn more about \"Gallbladder Removal Surgery: What to Expect at Home. \"  Current as of: February 10, 2021               Content Version: 13.0  © 2006-2021 [employer]. Care instructions adapted under license by [facility] [employer] [facility] [facility] (which disclaims liability or warranty for this information). If you have questions about a medical condition or this instruction, always ask your healthcare professional. [place] [address] any warranty or liability for your use of this information.

## 2022-03-19 PROBLEM — K81.9 CHOLECYSTITIS: Status: ACTIVE | Noted: 2021-12-21

## 2022-03-19 PROBLEM — C44.320 SCC (SQUAMOUS CELL CARCINOMA), FACE: Status: ACTIVE | Noted: 2020-11-12

## 2023-05-10 RX ORDER — ATORVASTATIN CALCIUM 40 MG/1
40 TABLET, FILM COATED ORAL NIGHTLY
COMMUNITY

## 2023-05-10 RX ORDER — METOPROLOL SUCCINATE 25 MG/1
25 TABLET, EXTENDED RELEASE ORAL DAILY
COMMUNITY

## 2023-05-10 RX ORDER — ALLOPURINOL 300 MG/1
300 TABLET ORAL DAILY
COMMUNITY

## 2023-05-10 RX ORDER — B-COMPLEX WITH VITAMIN C
1 TABLET ORAL EVERY EVENING
COMMUNITY

## 2023-05-10 RX ORDER — POLYVINYL ALCOHOL 14 MG/ML
1 SOLUTION/ DROPS OPHTHALMIC 2 TIMES DAILY
COMMUNITY

## 2023-05-10 RX ORDER — PREDNISONE 1 MG/1
2 TABLET ORAL DAILY
COMMUNITY

## 2023-05-10 RX ORDER — HYDROXYCHLOROQUINE SULFATE 200 MG/1
TABLET, FILM COATED ORAL 2 TIMES DAILY
COMMUNITY

## 2023-05-10 RX ORDER — OMEPRAZOLE 40 MG/1
CAPSULE, DELAYED RELEASE ORAL
COMMUNITY

## 2023-05-10 RX ORDER — ACETAMINOPHEN 500 MG
TABLET ORAL EVERY 6 HOURS PRN
COMMUNITY

## 2023-05-10 RX ORDER — WARFARIN SODIUM 2 MG/1
TABLET ORAL
COMMUNITY

## 2023-05-10 RX ORDER — ASPIRIN 81 MG/1
81 TABLET ORAL DAILY
COMMUNITY

## 2023-05-10 RX ORDER — HYDROCHLOROTHIAZIDE 12.5 MG/1
12.5 TABLET ORAL DAILY
COMMUNITY

## 2023-05-10 RX ORDER — LISINOPRIL 10 MG/1
TABLET ORAL EVERY EVENING
COMMUNITY

## 2023-05-10 RX ORDER — GABAPENTIN 300 MG/1
CAPSULE ORAL 2 TIMES DAILY
COMMUNITY

## 2023-05-10 RX ORDER — POTASSIUM CHLORIDE 750 MG/1
TABLET, FILM COATED, EXTENDED RELEASE ORAL
COMMUNITY

## 2023-05-10 RX ORDER — LATANOPROST 50 UG/ML
1 SOLUTION/ DROPS OPHTHALMIC
COMMUNITY

## 2023-11-19 ENCOUNTER — HOSPITAL ENCOUNTER (EMERGENCY)
Facility: HOSPITAL | Age: 83
Discharge: LEFT BEFORE DISCHARGE | DRG: 270 | End: 2023-11-19
Attending: STUDENT IN AN ORGANIZED HEALTH CARE EDUCATION/TRAINING PROGRAM
Payer: MEDICARE

## 2023-11-19 ENCOUNTER — APPOINTMENT (OUTPATIENT)
Facility: HOSPITAL | Age: 83
DRG: 270 | End: 2023-11-19
Payer: MEDICARE

## 2023-11-19 VITALS
TEMPERATURE: 97.5 F | OXYGEN SATURATION: 99 % | HEART RATE: 67 BPM | BODY MASS INDEX: 24.24 KG/M2 | WEIGHT: 154.76 LBS | DIASTOLIC BLOOD PRESSURE: 85 MMHG | SYSTOLIC BLOOD PRESSURE: 165 MMHG | RESPIRATION RATE: 16 BRPM

## 2023-11-19 DIAGNOSIS — T82.310A TYPE I ENDOLEAK OF AORTIC GRAFT (HCC): Primary | ICD-10-CM

## 2023-11-19 LAB
ALBUMIN SERPL-MCNC: 3.3 G/DL (ref 3.5–5)
ALBUMIN/GLOB SERPL: 1 (ref 1.1–2.2)
ALP SERPL-CCNC: 86 U/L (ref 45–117)
ALT SERPL-CCNC: 26 U/L (ref 12–78)
ANION GAP SERPL CALC-SCNC: 3 MMOL/L (ref 5–15)
APPEARANCE UR: CLEAR
AST SERPL-CCNC: 35 U/L (ref 15–37)
BACTERIA URNS QL MICRO: NEGATIVE /HPF
BASOPHILS # BLD: 0.1 K/UL (ref 0–0.1)
BASOPHILS NFR BLD: 1 % (ref 0–1)
BILIRUB SERPL-MCNC: 0.5 MG/DL (ref 0.2–1)
BILIRUB UR QL: NEGATIVE
BUN SERPL-MCNC: 22 MG/DL (ref 6–20)
BUN/CREAT SERPL: 21 (ref 12–20)
CALCIUM SERPL-MCNC: 8.7 MG/DL (ref 8.5–10.1)
CHLORIDE SERPL-SCNC: 101 MMOL/L (ref 97–108)
CO2 SERPL-SCNC: 28 MMOL/L (ref 21–32)
COLOR UR: ABNORMAL
COMMENT:: NORMAL
CREAT SERPL-MCNC: 1.05 MG/DL (ref 0.7–1.3)
DIFFERENTIAL METHOD BLD: ABNORMAL
EOSINOPHIL # BLD: 0.1 K/UL (ref 0–0.4)
EOSINOPHIL NFR BLD: 1 % (ref 0–7)
EPITH CASTS URNS QL MICRO: ABNORMAL /LPF
ERYTHROCYTE [DISTWIDTH] IN BLOOD BY AUTOMATED COUNT: 15.1 % (ref 11.5–14.5)
GLOBULIN SER CALC-MCNC: 3.3 G/DL (ref 2–4)
GLUCOSE SERPL-MCNC: 99 MG/DL (ref 65–100)
GLUCOSE UR STRIP.AUTO-MCNC: NEGATIVE MG/DL
HCT VFR BLD AUTO: 24.7 % (ref 36.6–50.3)
HGB BLD-MCNC: 7.7 G/DL (ref 12.1–17)
HGB UR QL STRIP: NEGATIVE
IMM GRANULOCYTES # BLD AUTO: 0.1 K/UL (ref 0–0.04)
IMM GRANULOCYTES NFR BLD AUTO: 2 % (ref 0–0.5)
KETONES UR QL STRIP.AUTO: NEGATIVE MG/DL
LACTATE SERPL-SCNC: 0.8 MMOL/L (ref 0.4–2)
LEUKOCYTE ESTERASE UR QL STRIP.AUTO: NEGATIVE
LIPASE SERPL-CCNC: 30 U/L (ref 13–75)
LYMPHOCYTES # BLD: 0.4 K/UL (ref 0.8–3.5)
LYMPHOCYTES NFR BLD: 8 % (ref 12–49)
MAGNESIUM SERPL-MCNC: 2 MG/DL (ref 1.6–2.4)
MCH RBC QN AUTO: 32.2 PG (ref 26–34)
MCHC RBC AUTO-ENTMCNC: 31.2 G/DL (ref 30–36.5)
MCV RBC AUTO: 103.3 FL (ref 80–99)
MONOCYTES # BLD: 0.8 K/UL (ref 0–1)
MONOCYTES NFR BLD: 14 % (ref 5–13)
NEUTS SEG # BLD: 4 K/UL (ref 1.8–8)
NEUTS SEG NFR BLD: 74 % (ref 32–75)
NITRITE UR QL STRIP.AUTO: NEGATIVE
NRBC # BLD: 0 K/UL (ref 0–0.01)
NRBC BLD-RTO: 0 PER 100 WBC
PH UR STRIP: 6 (ref 5–8)
PLATELET # BLD AUTO: 67 K/UL (ref 150–400)
PMV BLD AUTO: 10.3 FL (ref 8.9–12.9)
POTASSIUM SERPL-SCNC: 4.3 MMOL/L (ref 3.5–5.1)
PROT SERPL-MCNC: 6.6 G/DL (ref 6.4–8.2)
PROT UR STRIP-MCNC: ABNORMAL MG/DL
RBC # BLD AUTO: 2.39 M/UL (ref 4.1–5.7)
RBC #/AREA URNS HPF: ABNORMAL /HPF (ref 0–5)
RBC MORPH BLD: ABNORMAL
SODIUM SERPL-SCNC: 132 MMOL/L (ref 136–145)
SP GR UR REFRACTOMETRY: 1.02 (ref 1–1.03)
SPECIMEN HOLD: NORMAL
UROBILINOGEN UR QL STRIP.AUTO: 1 EU/DL (ref 0.2–1)
WBC # BLD AUTO: 5.5 K/UL (ref 4.1–11.1)
WBC URNS QL MICRO: ABNORMAL /HPF (ref 0–4)

## 2023-11-19 PROCEDURE — 83690 ASSAY OF LIPASE: CPT

## 2023-11-19 PROCEDURE — 99285 EMERGENCY DEPT VISIT HI MDM: CPT

## 2023-11-19 PROCEDURE — 80053 COMPREHEN METABOLIC PANEL: CPT

## 2023-11-19 PROCEDURE — 6360000004 HC RX CONTRAST MEDICATION

## 2023-11-19 PROCEDURE — 85025 COMPLETE CBC W/AUTO DIFF WBC: CPT

## 2023-11-19 PROCEDURE — 83735 ASSAY OF MAGNESIUM: CPT

## 2023-11-19 PROCEDURE — 81001 URINALYSIS AUTO W/SCOPE: CPT

## 2023-11-19 PROCEDURE — 36415 COLL VENOUS BLD VENIPUNCTURE: CPT

## 2023-11-19 PROCEDURE — 83605 ASSAY OF LACTIC ACID: CPT

## 2023-11-19 PROCEDURE — 74177 CT ABD & PELVIS W/CONTRAST: CPT

## 2023-11-19 RX ADMIN — IOPAMIDOL 100 ML: 755 INJECTION, SOLUTION INTRAVENOUS at 16:49

## 2023-11-19 ASSESSMENT — PAIN DESCRIPTION - LOCATION: LOCATION: ABDOMEN;HIP

## 2023-11-19 ASSESSMENT — PAIN - FUNCTIONAL ASSESSMENT: PAIN_FUNCTIONAL_ASSESSMENT: 0-10

## 2023-11-19 ASSESSMENT — PAIN DESCRIPTION - ORIENTATION: ORIENTATION: RIGHT;LEFT

## 2023-11-19 ASSESSMENT — PAIN SCALES - GENERAL: PAINLEVEL_OUTOF10: 6

## 2023-11-19 NOTE — ED TRIAGE NOTES
He arrives complaining of bilateral hip pain for 3 weeks that has progressed to abdominal pain yesterday and today. He has a hx of lymphoma, melanoma and a AAA repair. He is ambulatory.

## 2023-11-20 ENCOUNTER — APPOINTMENT (OUTPATIENT)
Facility: HOSPITAL | Age: 83
DRG: 270 | End: 2023-11-20
Payer: MEDICARE

## 2023-11-20 ENCOUNTER — ANESTHESIA (OUTPATIENT)
Facility: HOSPITAL | Age: 83
End: 2023-11-20
Payer: MEDICARE

## 2023-11-20 ENCOUNTER — ANESTHESIA EVENT (OUTPATIENT)
Facility: HOSPITAL | Age: 83
End: 2023-11-20
Payer: MEDICARE

## 2023-11-20 ENCOUNTER — HOSPITAL ENCOUNTER (INPATIENT)
Facility: HOSPITAL | Age: 83
LOS: 5 days | Discharge: HOME | DRG: 270 | End: 2023-11-25
Attending: EMERGENCY MEDICINE | Admitting: SURGERY
Payer: MEDICARE

## 2023-11-20 DIAGNOSIS — I71.30 LEAKING ABDOMINAL AORTIC ANEURYSM (AAA) (HCC): Primary | ICD-10-CM

## 2023-11-20 LAB
ALBUMIN SERPL-MCNC: 3.5 G/DL (ref 3.5–5)
ALBUMIN/GLOB SERPL: 1 (ref 1.1–2.2)
ALP SERPL-CCNC: 89 U/L (ref 45–117)
ALT SERPL-CCNC: 23 U/L (ref 12–78)
ANION GAP SERPL CALC-SCNC: 5 MMOL/L (ref 5–15)
APPEARANCE UR: CLEAR
AST SERPL-CCNC: 36 U/L (ref 15–37)
BACTERIA URNS QL MICRO: NEGATIVE /HPF
BASOPHILS # BLD: 0 K/UL (ref 0–0.1)
BASOPHILS NFR BLD: 0 % (ref 0–1)
BILIRUB SERPL-MCNC: 0.9 MG/DL (ref 0.2–1)
BILIRUB UR QL: NEGATIVE
BUN SERPL-MCNC: 20 MG/DL (ref 6–20)
BUN/CREAT SERPL: 19 (ref 12–20)
CALCIUM SERPL-MCNC: 8.9 MG/DL (ref 8.5–10.1)
CHLORIDE SERPL-SCNC: 97 MMOL/L (ref 97–108)
CO2 SERPL-SCNC: 29 MMOL/L (ref 21–32)
COLOR UR: ABNORMAL
COMMENT:: NORMAL
CREAT SERPL-MCNC: 1.05 MG/DL (ref 0.7–1.3)
DIFFERENTIAL METHOD BLD: ABNORMAL
EOSINOPHIL # BLD: 0.1 K/UL (ref 0–0.4)
EOSINOPHIL NFR BLD: 1 % (ref 0–7)
EPITH CASTS URNS QL MICRO: ABNORMAL /LPF
ERYTHROCYTE [DISTWIDTH] IN BLOOD BY AUTOMATED COUNT: 15.3 % (ref 11.5–14.5)
GLOBULIN SER CALC-MCNC: 3.5 G/DL (ref 2–4)
GLUCOSE SERPL-MCNC: 88 MG/DL (ref 65–100)
GLUCOSE UR STRIP.AUTO-MCNC: NEGATIVE MG/DL
HCT VFR BLD AUTO: 25.9 % (ref 36.6–50.3)
HGB BLD-MCNC: 6 G/DL (ref 12.1–17)
HGB BLD-MCNC: 6 G/DL (ref 12.1–17)
HGB BLD-MCNC: 6.8 G/DL (ref 12.1–17)
HGB BLD-MCNC: 6.8 G/DL (ref 12.1–17)
HGB BLD-MCNC: 7.1 G/DL (ref 12.1–17)
HGB BLD-MCNC: 8.3 G/DL (ref 12.1–17)
HGB UR QL STRIP: ABNORMAL
HISTORY CHECK: NORMAL
HYALINE CASTS URNS QL MICRO: ABNORMAL /LPF (ref 0–5)
IMM GRANULOCYTES # BLD AUTO: 0 K/UL
IMM GRANULOCYTES NFR BLD AUTO: 0 %
KETONES UR QL STRIP.AUTO: NEGATIVE MG/DL
LEUKOCYTE ESTERASE UR QL STRIP.AUTO: NEGATIVE
LIPASE SERPL-CCNC: 28 U/L (ref 13–75)
LYMPHOCYTES # BLD: 0.2 K/UL (ref 0.8–3.5)
LYMPHOCYTES NFR BLD: 3 % (ref 12–49)
MAGNESIUM SERPL-MCNC: 2 MG/DL (ref 1.6–2.4)
MCH RBC QN AUTO: 32.8 PG (ref 26–34)
MCHC RBC AUTO-ENTMCNC: 32 G/DL (ref 30–36.5)
MCV RBC AUTO: 102.4 FL (ref 80–99)
MONOCYTES # BLD: 0.5 K/UL (ref 0–1)
MONOCYTES NFR BLD: 9 % (ref 5–13)
NEUTS SEG # BLD: 4.9 K/UL (ref 1.8–8)
NEUTS SEG NFR BLD: 87 % (ref 32–75)
NITRITE UR QL STRIP.AUTO: NEGATIVE
NRBC # BLD: 0 K/UL (ref 0–0.01)
NRBC BLD-RTO: 0 PER 100 WBC
PH UR STRIP: 6.5 (ref 5–8)
PLATELET # BLD AUTO: 69 K/UL (ref 150–400)
PMV BLD AUTO: 11.7 FL (ref 8.9–12.9)
POTASSIUM SERPL-SCNC: 3.9 MMOL/L (ref 3.5–5.1)
PROT SERPL-MCNC: 7 G/DL (ref 6.4–8.2)
PROT UR STRIP-MCNC: NEGATIVE MG/DL
RBC # BLD AUTO: 2.53 M/UL (ref 4.1–5.7)
RBC #/AREA URNS HPF: ABNORMAL /HPF (ref 0–5)
RBC MORPH BLD: ABNORMAL
RBC MORPH BLD: ABNORMAL
SODIUM SERPL-SCNC: 131 MMOL/L (ref 136–145)
SP GR UR REFRACTOMETRY: 1.01 (ref 1–1.03)
SPECIMEN HOLD: NORMAL
SPECIMEN HOLD: NORMAL
UROBILINOGEN UR QL STRIP.AUTO: 0.2 EU/DL (ref 0.2–1)
WBC # BLD AUTO: 5.7 K/UL (ref 4.1–11.1)
WBC URNS QL MICRO: ABNORMAL /HPF (ref 0–4)

## 2023-11-20 PROCEDURE — 6360000002 HC RX W HCPCS: Performed by: NURSE ANESTHETIST, CERTIFIED REGISTERED

## 2023-11-20 PROCEDURE — 85025 COMPLETE CBC W/AUTO DIFF WBC: CPT

## 2023-11-20 PROCEDURE — 2580000003 HC RX 258: Performed by: SURGERY

## 2023-11-20 PROCEDURE — 6360000002 HC RX W HCPCS: Performed by: EMERGENCY MEDICINE

## 2023-11-20 PROCEDURE — 71045 X-RAY EXAM CHEST 1 VIEW: CPT

## 2023-11-20 PROCEDURE — 3700000001 HC ADD 15 MINUTES (ANESTHESIA): Performed by: SURGERY

## 2023-11-20 PROCEDURE — 7100000001 HC PACU RECOVERY - ADDTL 15 MIN: Performed by: SURGERY

## 2023-11-20 PROCEDURE — 83735 ASSAY OF MAGNESIUM: CPT

## 2023-11-20 PROCEDURE — 2580000003 HC RX 258: Performed by: NURSE ANESTHETIST, CERTIFIED REGISTERED

## 2023-11-20 PROCEDURE — 85018 HEMOGLOBIN: CPT

## 2023-11-20 PROCEDURE — 6360000002 HC RX W HCPCS

## 2023-11-20 PROCEDURE — C1760 CLOSURE DEV, VASC: HCPCS | Performed by: SURGERY

## 2023-11-20 PROCEDURE — 02WY3DZ REVISION OF INTRALUMINAL DEVICE IN GREAT VESSEL, PERCUTANEOUS APPROACH: ICD-10-PCS | Performed by: SURGERY

## 2023-11-20 PROCEDURE — P9073 PLATELETS PHERESIS PATH REDU: HCPCS

## 2023-11-20 PROCEDURE — C1725 CATH, TRANSLUMIN NON-LASER: HCPCS | Performed by: SURGERY

## 2023-11-20 PROCEDURE — 96374 THER/PROPH/DIAG INJ IV PUSH: CPT

## 2023-11-20 PROCEDURE — C1753 CATH, INTRAVAS ULTRASOUND: HCPCS | Performed by: SURGERY

## 2023-11-20 PROCEDURE — 2500000003 HC RX 250 WO HCPCS: Performed by: NURSE ANESTHETIST, CERTIFIED REGISTERED

## 2023-11-20 PROCEDURE — 86900 BLOOD TYPING SEROLOGIC ABO: CPT

## 2023-11-20 PROCEDURE — 80053 COMPREHEN METABOLIC PANEL: CPT

## 2023-11-20 PROCEDURE — 36430 TRANSFUSION BLD/BLD COMPNT: CPT

## 2023-11-20 PROCEDURE — 7100000000 HC PACU RECOVERY - FIRST 15 MIN: Performed by: SURGERY

## 2023-11-20 PROCEDURE — P9016 RBC LEUKOCYTES REDUCED: HCPCS

## 2023-11-20 PROCEDURE — 2000000000 HC ICU R&B

## 2023-11-20 PROCEDURE — C1768 GRAFT, VASCULAR: HCPCS | Performed by: SURGERY

## 2023-11-20 PROCEDURE — P9045 ALBUMIN (HUMAN), 5%, 250 ML: HCPCS | Performed by: NURSE ANESTHETIST, CERTIFIED REGISTERED

## 2023-11-20 PROCEDURE — 36415 COLL VENOUS BLD VENIPUNCTURE: CPT

## 2023-11-20 PROCEDURE — 86923 COMPATIBILITY TEST ELECTRIC: CPT

## 2023-11-20 PROCEDURE — 3600000015 HC SURGERY LEVEL 5 ADDTL 15MIN: Performed by: SURGERY

## 2023-11-20 PROCEDURE — 81001 URINALYSIS AUTO W/SCOPE: CPT

## 2023-11-20 PROCEDURE — A4217 STERILE WATER/SALINE, 500 ML: HCPCS | Performed by: SURGERY

## 2023-11-20 PROCEDURE — 6360000004 HC RX CONTRAST MEDICATION: Performed by: SURGERY

## 2023-11-20 PROCEDURE — 86850 RBC ANTIBODY SCREEN: CPT

## 2023-11-20 PROCEDURE — 99285 EMERGENCY DEPT VISIT HI MDM: CPT

## 2023-11-20 PROCEDURE — 6360000002 HC RX W HCPCS: Performed by: SURGERY

## 2023-11-20 PROCEDURE — C1874 STENT, COATED/COV W/DEL SYS: HCPCS | Performed by: SURGERY

## 2023-11-20 PROCEDURE — 83690 ASSAY OF LIPASE: CPT

## 2023-11-20 PROCEDURE — 3700000000 HC ANESTHESIA ATTENDED CARE: Performed by: SURGERY

## 2023-11-20 PROCEDURE — C1769 GUIDE WIRE: HCPCS | Performed by: SURGERY

## 2023-11-20 PROCEDURE — 04WY3DZ REVISION OF INTRALUMINAL DEVICE IN LOWER ARTERY, PERCUTANEOUS APPROACH: ICD-10-PCS | Performed by: SURGERY

## 2023-11-20 PROCEDURE — 3600000005 HC SURGERY LEVEL 5 BASE: Performed by: SURGERY

## 2023-11-20 PROCEDURE — 2709999900 HC NON-CHARGEABLE SUPPLY: Performed by: SURGERY

## 2023-11-20 PROCEDURE — C1894 INTRO/SHEATH, NON-LASER: HCPCS | Performed by: SURGERY

## 2023-11-20 PROCEDURE — C1887 CATHETER, GUIDING: HCPCS | Performed by: SURGERY

## 2023-11-20 PROCEDURE — 6370000000 HC RX 637 (ALT 250 FOR IP): Performed by: SURGERY

## 2023-11-20 PROCEDURE — 02HV33Z INSERTION OF INFUSION DEVICE INTO SUPERIOR VENA CAVA, PERCUTANEOUS APPROACH: ICD-10-PCS | Performed by: SURGERY

## 2023-11-20 PROCEDURE — 74176 CT ABD & PELVIS W/O CONTRAST: CPT

## 2023-11-20 PROCEDURE — 2720000010 HC SURG SUPPLY STERILE: Performed by: SURGERY

## 2023-11-20 PROCEDURE — 2580000003 HC RX 258

## 2023-11-20 PROCEDURE — 86901 BLOOD TYPING SEROLOGIC RH(D): CPT

## 2023-11-20 DEVICE — HEMASHIELD GOLD KNITTED MICROVEL STRAIGHT DOUBLE VELOUR VASCULAR GRAFT WITH GRAFT SIZER ACCESSORY
Type: IMPLANTABLE DEVICE | Site: SUBCLAVIAN | Status: FUNCTIONAL
Brand: HEMASHIELD

## 2023-11-20 DEVICE — VIABAHN BX BALLOON EXP ENDO 6MMX79MM 7FR 135CMCATH HEPARIN
Type: IMPLANTABLE DEVICE | Site: RENAL | Status: FUNCTIONAL
Brand: GORE VIABAHN VBX BALLOON EXPANDABLE ENDO

## 2023-11-20 DEVICE — IMPLANTABLE DEVICE: Type: IMPLANTABLE DEVICE | Site: AORTA | Status: FUNCTIONAL

## 2023-11-20 RX ORDER — FENTANYL CITRATE 50 UG/ML
INJECTION, SOLUTION INTRAMUSCULAR; INTRAVENOUS PRN
Status: DISCONTINUED | OUTPATIENT
Start: 2023-11-20 | End: 2023-11-20 | Stop reason: SDUPTHER

## 2023-11-20 RX ORDER — SODIUM CHLORIDE 0.9 % (FLUSH) 0.9 %
5-40 SYRINGE (ML) INJECTION PRN
Status: DISCONTINUED | OUTPATIENT
Start: 2023-11-20 | End: 2023-11-20 | Stop reason: HOSPADM

## 2023-11-20 RX ORDER — ONDANSETRON 2 MG/ML
INJECTION INTRAMUSCULAR; INTRAVENOUS PRN
Status: DISCONTINUED | OUTPATIENT
Start: 2023-11-20 | End: 2023-11-20 | Stop reason: SDUPTHER

## 2023-11-20 RX ORDER — POTASSIUM CHLORIDE 750 MG/1
10 TABLET, FILM COATED, EXTENDED RELEASE ORAL DAILY
Status: DISCONTINUED | OUTPATIENT
Start: 2023-11-21 | End: 2023-11-25 | Stop reason: HOSPADM

## 2023-11-20 RX ORDER — PROPOFOL 10 MG/ML
INJECTION, EMULSION INTRAVENOUS PRN
Status: DISCONTINUED | OUTPATIENT
Start: 2023-11-20 | End: 2023-11-20 | Stop reason: SDUPTHER

## 2023-11-20 RX ORDER — ATORVASTATIN CALCIUM 40 MG/1
40 TABLET, FILM COATED ORAL NIGHTLY
Status: DISCONTINUED | OUTPATIENT
Start: 2023-11-20 | End: 2023-11-25 | Stop reason: HOSPADM

## 2023-11-20 RX ORDER — CLOPIDOGREL BISULFATE 75 MG/1
75 TABLET ORAL DAILY
Status: DISCONTINUED | OUTPATIENT
Start: 2023-11-21 | End: 2023-11-25 | Stop reason: HOSPADM

## 2023-11-20 RX ORDER — HYDRALAZINE HYDROCHLORIDE 20 MG/ML
10 INJECTION INTRAMUSCULAR; INTRAVENOUS EVERY 4 HOURS PRN
Status: DISCONTINUED | OUTPATIENT
Start: 2023-11-20 | End: 2023-11-25 | Stop reason: HOSPADM

## 2023-11-20 RX ORDER — CLOPIDOGREL BISULFATE 75 MG/1
75 TABLET ORAL DAILY
COMMUNITY

## 2023-11-20 RX ORDER — FUROSEMIDE 20 MG/1
20 TABLET ORAL 2 TIMES DAILY
COMMUNITY

## 2023-11-20 RX ORDER — MIDAZOLAM HYDROCHLORIDE 2 MG/2ML
2 INJECTION, SOLUTION INTRAMUSCULAR; INTRAVENOUS
Status: DISCONTINUED | OUTPATIENT
Start: 2023-11-20 | End: 2023-11-20 | Stop reason: HOSPADM

## 2023-11-20 RX ORDER — ALBUMIN, HUMAN INJ 5% 5 %
SOLUTION INTRAVENOUS PRN
Status: DISCONTINUED | OUTPATIENT
Start: 2023-11-20 | End: 2023-11-20 | Stop reason: SDUPTHER

## 2023-11-20 RX ORDER — SODIUM CHLORIDE 9 MG/ML
INJECTION, SOLUTION INTRAVENOUS PRN
Status: DISCONTINUED | OUTPATIENT
Start: 2023-11-20 | End: 2023-11-25 | Stop reason: HOSPADM

## 2023-11-20 RX ORDER — ONDANSETRON 2 MG/ML
4 INJECTION INTRAMUSCULAR; INTRAVENOUS
Status: DISCONTINUED | OUTPATIENT
Start: 2023-11-20 | End: 2023-11-20 | Stop reason: HOSPADM

## 2023-11-20 RX ORDER — ASPIRIN 81 MG/1
81 TABLET ORAL DAILY
Status: DISCONTINUED | OUTPATIENT
Start: 2023-11-20 | End: 2023-11-24

## 2023-11-20 RX ORDER — CEFAZOLIN SODIUM 1 G/3ML
INJECTION, POWDER, FOR SOLUTION INTRAMUSCULAR; INTRAVENOUS PRN
Status: DISCONTINUED | OUTPATIENT
Start: 2023-11-20 | End: 2023-11-20 | Stop reason: SDUPTHER

## 2023-11-20 RX ORDER — PREDNISONE 1 MG/1
2 TABLET ORAL DAILY
Status: DISCONTINUED | OUTPATIENT
Start: 2023-11-21 | End: 2023-11-25 | Stop reason: HOSPADM

## 2023-11-20 RX ORDER — HYDROMORPHONE HYDROCHLORIDE 1 MG/ML
0.5 INJECTION, SOLUTION INTRAMUSCULAR; INTRAVENOUS; SUBCUTANEOUS EVERY 5 MIN PRN
Status: DISCONTINUED | OUTPATIENT
Start: 2023-11-20 | End: 2023-11-20 | Stop reason: HOSPADM

## 2023-11-20 RX ORDER — ROCURONIUM BROMIDE 10 MG/ML
INJECTION, SOLUTION INTRAVENOUS PRN
Status: DISCONTINUED | OUTPATIENT
Start: 2023-11-20 | End: 2023-11-20 | Stop reason: SDUPTHER

## 2023-11-20 RX ORDER — LABETALOL HYDROCHLORIDE 5 MG/ML
10 INJECTION, SOLUTION INTRAVENOUS
Status: DISCONTINUED | OUTPATIENT
Start: 2023-11-20 | End: 2023-11-20 | Stop reason: HOSPADM

## 2023-11-20 RX ORDER — FUROSEMIDE 20 MG/1
20 TABLET ORAL 2 TIMES DAILY
Status: DISCONTINUED | OUTPATIENT
Start: 2023-11-20 | End: 2023-11-25 | Stop reason: HOSPADM

## 2023-11-20 RX ORDER — SODIUM CHLORIDE, SODIUM LACTATE, POTASSIUM CHLORIDE, CALCIUM CHLORIDE 600; 310; 30; 20 MG/100ML; MG/100ML; MG/100ML; MG/100ML
INJECTION, SOLUTION INTRAVENOUS CONTINUOUS PRN
Status: DISCONTINUED | OUTPATIENT
Start: 2023-11-20 | End: 2023-11-20 | Stop reason: SDUPTHER

## 2023-11-20 RX ORDER — ONDANSETRON 4 MG/1
4 TABLET, ORALLY DISINTEGRATING ORAL EVERY 8 HOURS PRN
Status: DISCONTINUED | OUTPATIENT
Start: 2023-11-20 | End: 2023-11-25 | Stop reason: HOSPADM

## 2023-11-20 RX ORDER — METOPROLOL SUCCINATE 25 MG/1
25 TABLET, EXTENDED RELEASE ORAL DAILY
Status: DISCONTINUED | OUTPATIENT
Start: 2023-11-21 | End: 2023-11-25 | Stop reason: HOSPADM

## 2023-11-20 RX ORDER — VITAMIN B COMPLEX
1 CAPSULE ORAL DAILY
COMMUNITY

## 2023-11-20 RX ORDER — HYDRALAZINE HYDROCHLORIDE 20 MG/ML
10 INJECTION INTRAMUSCULAR; INTRAVENOUS EVERY 4 HOURS PRN
Status: CANCELLED | OUTPATIENT
Start: 2023-11-20

## 2023-11-20 RX ORDER — HYDROXYCHLOROQUINE SULFATE 200 MG/1
200 TABLET, FILM COATED ORAL 2 TIMES DAILY
Status: DISCONTINUED | OUTPATIENT
Start: 2023-11-20 | End: 2023-11-25 | Stop reason: HOSPADM

## 2023-11-20 RX ORDER — HYDROMORPHONE HYDROCHLORIDE 1 MG/ML
0.5 INJECTION, SOLUTION INTRAMUSCULAR; INTRAVENOUS; SUBCUTANEOUS
Status: DISCONTINUED | OUTPATIENT
Start: 2023-11-20 | End: 2023-11-25 | Stop reason: HOSPADM

## 2023-11-20 RX ORDER — SODIUM CHLORIDE 9 MG/ML
INJECTION, SOLUTION INTRAVENOUS PRN
Status: DISCONTINUED | OUTPATIENT
Start: 2023-11-20 | End: 2023-11-23 | Stop reason: SDUPTHER

## 2023-11-20 RX ORDER — PROTAMINE SULFATE 10 MG/ML
INJECTION, SOLUTION INTRAVENOUS PRN
Status: DISCONTINUED | OUTPATIENT
Start: 2023-11-20 | End: 2023-11-20 | Stop reason: SDUPTHER

## 2023-11-20 RX ORDER — SODIUM CHLORIDE 0.9 % (FLUSH) 0.9 %
5-40 SYRINGE (ML) INJECTION PRN
Status: DISCONTINUED | OUTPATIENT
Start: 2023-11-20 | End: 2023-11-25 | Stop reason: HOSPADM

## 2023-11-20 RX ORDER — EPHEDRINE SULFATE 50 MG/ML
INJECTION INTRAVENOUS PRN
Status: DISCONTINUED | OUTPATIENT
Start: 2023-11-20 | End: 2023-11-20 | Stop reason: SDUPTHER

## 2023-11-20 RX ORDER — SODIUM CHLORIDE 9 MG/ML
INJECTION, SOLUTION INTRAVENOUS PRN
Status: DISCONTINUED | OUTPATIENT
Start: 2023-11-20 | End: 2023-11-20 | Stop reason: HOSPADM

## 2023-11-20 RX ORDER — SODIUM CHLORIDE 9 MG/ML
INJECTION, SOLUTION INTRAVENOUS CONTINUOUS
Status: DISCONTINUED | OUTPATIENT
Start: 2023-11-20 | End: 2023-11-22 | Stop reason: ALTCHOICE

## 2023-11-20 RX ORDER — ONDANSETRON 2 MG/ML
4 INJECTION INTRAMUSCULAR; INTRAVENOUS EVERY 6 HOURS PRN
Status: DISCONTINUED | OUTPATIENT
Start: 2023-11-20 | End: 2023-11-25 | Stop reason: HOSPADM

## 2023-11-20 RX ORDER — SODIUM CHLORIDE 9 MG/ML
INJECTION, SOLUTION INTRAVENOUS CONTINUOUS PRN
Status: DISCONTINUED | OUTPATIENT
Start: 2023-11-20 | End: 2023-11-20 | Stop reason: SDUPTHER

## 2023-11-20 RX ORDER — OXYCODONE HYDROCHLORIDE 5 MG/1
10 TABLET ORAL EVERY 4 HOURS PRN
Status: DISCONTINUED | OUTPATIENT
Start: 2023-11-20 | End: 2023-11-25 | Stop reason: HOSPADM

## 2023-11-20 RX ORDER — OXYCODONE HYDROCHLORIDE 5 MG/1
5 TABLET ORAL
Status: DISCONTINUED | OUTPATIENT
Start: 2023-11-20 | End: 2023-11-20 | Stop reason: HOSPADM

## 2023-11-20 RX ORDER — GABAPENTIN 300 MG/1
300 CAPSULE ORAL 2 TIMES DAILY
Status: DISCONTINUED | OUTPATIENT
Start: 2023-11-20 | End: 2023-11-25 | Stop reason: HOSPADM

## 2023-11-20 RX ORDER — HEPARIN SODIUM 1000 [USP'U]/ML
INJECTION, SOLUTION INTRAVENOUS; SUBCUTANEOUS PRN
Status: DISCONTINUED | OUTPATIENT
Start: 2023-11-20 | End: 2023-11-20 | Stop reason: SDUPTHER

## 2023-11-20 RX ORDER — FENTANYL CITRATE 50 UG/ML
25 INJECTION, SOLUTION INTRAMUSCULAR; INTRAVENOUS EVERY 5 MIN PRN
Status: DISCONTINUED | OUTPATIENT
Start: 2023-11-20 | End: 2023-11-20 | Stop reason: HOSPADM

## 2023-11-20 RX ORDER — LIDOCAINE HYDROCHLORIDE 20 MG/ML
INJECTION, SOLUTION EPIDURAL; INFILTRATION; INTRACAUDAL; PERINEURAL PRN
Status: DISCONTINUED | OUTPATIENT
Start: 2023-11-20 | End: 2023-11-20 | Stop reason: SDUPTHER

## 2023-11-20 RX ORDER — MORPHINE SULFATE 4 MG/ML
4 INJECTION, SOLUTION INTRAMUSCULAR; INTRAVENOUS
Status: COMPLETED | OUTPATIENT
Start: 2023-11-20 | End: 2023-11-20

## 2023-11-20 RX ORDER — PHENYLEPHRINE HCL IN 0.9% NACL 0.4MG/10ML
SYRINGE (ML) INTRAVENOUS PRN
Status: DISCONTINUED | OUTPATIENT
Start: 2023-11-20 | End: 2023-11-20 | Stop reason: SDUPTHER

## 2023-11-20 RX ORDER — SODIUM CHLORIDE 0.9 % (FLUSH) 0.9 %
5-40 SYRINGE (ML) INJECTION EVERY 12 HOURS SCHEDULED
Status: DISCONTINUED | OUTPATIENT
Start: 2023-11-20 | End: 2023-11-20 | Stop reason: HOSPADM

## 2023-11-20 RX ORDER — CALCIUM CHLORIDE 100 MG/ML
INJECTION INTRAVENOUS; INTRAVENTRICULAR PRN
Status: DISCONTINUED | OUTPATIENT
Start: 2023-11-20 | End: 2023-11-20 | Stop reason: SDUPTHER

## 2023-11-20 RX ORDER — OMEPRAZOLE 40 MG/1
40 CAPSULE, DELAYED RELEASE ORAL DAILY
Status: DISCONTINUED | OUTPATIENT
Start: 2023-11-20 | End: 2023-11-21

## 2023-11-20 RX ORDER — PROCHLORPERAZINE EDISYLATE 5 MG/ML
5 INJECTION INTRAMUSCULAR; INTRAVENOUS
Status: DISCONTINUED | OUTPATIENT
Start: 2023-11-20 | End: 2023-11-20 | Stop reason: HOSPADM

## 2023-11-20 RX ORDER — OXYCODONE HYDROCHLORIDE 5 MG/1
5 TABLET ORAL EVERY 4 HOURS PRN
Status: DISCONTINUED | OUTPATIENT
Start: 2023-11-20 | End: 2023-11-25 | Stop reason: HOSPADM

## 2023-11-20 RX ORDER — DIPHENHYDRAMINE HYDROCHLORIDE 50 MG/ML
12.5 INJECTION INTRAMUSCULAR; INTRAVENOUS
Status: DISCONTINUED | OUTPATIENT
Start: 2023-11-20 | End: 2023-11-20 | Stop reason: HOSPADM

## 2023-11-20 RX ORDER — SODIUM CHLORIDE 0.9 % (FLUSH) 0.9 %
5-40 SYRINGE (ML) INJECTION EVERY 12 HOURS SCHEDULED
Status: DISCONTINUED | OUTPATIENT
Start: 2023-11-20 | End: 2023-11-25 | Stop reason: HOSPADM

## 2023-11-20 RX ORDER — SUCCINYLCHOLINE/SOD CL,ISO/PF 200MG/10ML
SYRINGE (ML) INTRAVENOUS PRN
Status: DISCONTINUED | OUTPATIENT
Start: 2023-11-20 | End: 2023-11-20 | Stop reason: SDUPTHER

## 2023-11-20 RX ADMIN — SODIUM CHLORIDE, POTASSIUM CHLORIDE, SODIUM LACTATE AND CALCIUM CHLORIDE: 600; 310; 30; 20 INJECTION, SOLUTION INTRAVENOUS at 14:03

## 2023-11-20 RX ADMIN — HYDROCORTISONE SODIUM SUCCINATE 100 MG: 100 INJECTION, POWDER, FOR SOLUTION INTRAMUSCULAR; INTRAVENOUS at 14:30

## 2023-11-20 RX ADMIN — SODIUM BICARBONATE 50 MEQ: 84 INJECTION, SOLUTION INTRAVENOUS at 18:10

## 2023-11-20 RX ADMIN — CEFAZOLIN 2 G: 1 INJECTION, POWDER, FOR SOLUTION INTRAMUSCULAR; INTRAVENOUS at 14:37

## 2023-11-20 RX ADMIN — ROCURONIUM BROMIDE 10 MG: 10 INJECTION, SOLUTION INTRAVENOUS at 17:05

## 2023-11-20 RX ADMIN — ONDANSETRON 4 MG: 2 INJECTION INTRAMUSCULAR; INTRAVENOUS at 18:44

## 2023-11-20 RX ADMIN — PROPOFOL 140 MG: 10 INJECTION, EMULSION INTRAVENOUS at 14:12

## 2023-11-20 RX ADMIN — ROCURONIUM BROMIDE 45 MG: 10 INJECTION, SOLUTION INTRAVENOUS at 14:19

## 2023-11-20 RX ADMIN — MORPHINE SULFATE 4 MG: 4 INJECTION, SOLUTION INTRAMUSCULAR; INTRAVENOUS at 13:12

## 2023-11-20 RX ADMIN — Medication 160 MG: at 14:12

## 2023-11-20 RX ADMIN — HYDROMORPHONE HYDROCHLORIDE 0.5 MG: 1 INJECTION, SOLUTION INTRAMUSCULAR; INTRAVENOUS; SUBCUTANEOUS at 23:07

## 2023-11-20 RX ADMIN — ALBUMIN (HUMAN) 250 ML: 12.5 INJECTION, SOLUTION INTRAVENOUS at 15:55

## 2023-11-20 RX ADMIN — LIDOCAINE HYDROCHLORIDE 80 MG: 20 INJECTION, SOLUTION EPIDURAL; INFILTRATION; INTRACAUDAL; PERINEURAL at 14:12

## 2023-11-20 RX ADMIN — Medication 40 MCG: at 17:17

## 2023-11-20 RX ADMIN — PHENYLEPHRINE HYDROCHLORIDE 30 MCG/MIN: 10 INJECTION INTRAVENOUS at 23:11

## 2023-11-20 RX ADMIN — SODIUM CHLORIDE, PRESERVATIVE FREE 5 ML: 5 INJECTION INTRAVENOUS at 22:00

## 2023-11-20 RX ADMIN — HEPARIN SODIUM 2000 UNITS: 1000 INJECTION INTRAVENOUS; SUBCUTANEOUS at 15:58

## 2023-11-20 RX ADMIN — CALCIUM CHLORIDE 0.5 G: 100 INJECTION, SOLUTION INTRAVENOUS; INTRAVENTRICULAR at 18:02

## 2023-11-20 RX ADMIN — ROCURONIUM BROMIDE 5 MG: 10 INJECTION, SOLUTION INTRAVENOUS at 14:12

## 2023-11-20 RX ADMIN — CALCIUM CHLORIDE 0.5 G: 100 INJECTION, SOLUTION INTRAVENOUS; INTRAVENTRICULAR at 18:22

## 2023-11-20 RX ADMIN — PHENYLEPHRINE HYDROCHLORIDE 50 MCG/MIN: 10 INJECTION INTRAVENOUS at 14:24

## 2023-11-20 RX ADMIN — FENTANYL CITRATE 50 MCG: 50 INJECTION, SOLUTION INTRAMUSCULAR; INTRAVENOUS at 14:45

## 2023-11-20 RX ADMIN — FENTANYL CITRATE 50 MCG: 50 INJECTION, SOLUTION INTRAMUSCULAR; INTRAVENOUS at 14:19

## 2023-11-20 RX ADMIN — Medication 120 MCG: at 14:24

## 2023-11-20 RX ADMIN — SODIUM CHLORIDE: 9 INJECTION, SOLUTION INTRAVENOUS at 14:03

## 2023-11-20 RX ADMIN — HEPARIN SODIUM 8000 UNITS: 1000 INJECTION INTRAVENOUS; SUBCUTANEOUS at 14:58

## 2023-11-20 RX ADMIN — CEFAZOLIN 2 G: 1 INJECTION, POWDER, FOR SOLUTION INTRAMUSCULAR; INTRAVENOUS at 18:30

## 2023-11-20 RX ADMIN — HYDROXYCHLOROQUINE SULFATE 200 MG: 200 TABLET ORAL at 22:12

## 2023-11-20 RX ADMIN — SODIUM CHLORIDE: 9 INJECTION, SOLUTION INTRAVENOUS at 17:31

## 2023-11-20 RX ADMIN — EPHEDRINE SULFATE 10 MG: 50 INJECTION INTRAVENOUS at 18:03

## 2023-11-20 RX ADMIN — ROCURONIUM BROMIDE 10 MG: 10 INJECTION, SOLUTION INTRAVENOUS at 15:44

## 2023-11-20 RX ADMIN — PROTAMINE SULFATE 20 MG: 10 INJECTION, SOLUTION INTRAVENOUS at 18:22

## 2023-11-20 RX ADMIN — SODIUM BICARBONATE 50 MEQ: 84 INJECTION, SOLUTION INTRAVENOUS at 18:40

## 2023-11-20 ASSESSMENT — PAIN DESCRIPTION - ORIENTATION
ORIENTATION: RIGHT
ORIENTATION: RIGHT;LEFT
ORIENTATION: RIGHT

## 2023-11-20 ASSESSMENT — PAIN DESCRIPTION - ONSET
ONSET: ON-GOING
ONSET: PROGRESSIVE

## 2023-11-20 ASSESSMENT — PAIN DESCRIPTION - FREQUENCY
FREQUENCY: CONTINUOUS
FREQUENCY: CONTINUOUS

## 2023-11-20 ASSESSMENT — PAIN DESCRIPTION - PAIN TYPE
TYPE: ACUTE PAIN
TYPE: ACUTE PAIN

## 2023-11-20 ASSESSMENT — PAIN SCALES - GENERAL
PAINLEVEL_OUTOF10: 7
PAINLEVEL_OUTOF10: 8
PAINLEVEL_OUTOF10: 10
PAINLEVEL_OUTOF10: 8

## 2023-11-20 ASSESSMENT — PAIN DESCRIPTION - DESCRIPTORS
DESCRIPTORS: ACHING

## 2023-11-20 ASSESSMENT — PAIN - FUNCTIONAL ASSESSMENT
PAIN_FUNCTIONAL_ASSESSMENT: ACTIVITIES ARE NOT PREVENTED
PAIN_FUNCTIONAL_ASSESSMENT: 0-10
PAIN_FUNCTIONAL_ASSESSMENT: ACTIVITIES ARE NOT PREVENTED

## 2023-11-20 ASSESSMENT — PAIN DESCRIPTION - LOCATION
LOCATION: HIP
LOCATION: ABDOMEN
LOCATION: ABDOMEN

## 2023-11-20 ASSESSMENT — ENCOUNTER SYMPTOMS: ABDOMINAL PAIN: 1

## 2023-11-20 ASSESSMENT — PAIN DESCRIPTION - DIRECTION: RADIATING_TOWARDS: BACK

## 2023-11-20 NOTE — ANESTHESIA PRE PROCEDURE
Department of Anesthesiology  Preprocedure Note       Name:  Gilford Fletcher. Age:  82 y.o.  :  1940                                          MRN:  [id number]         Date:  2023      Surgeon: Avila Ruth):  Jody Feng MD    Procedure: Procedure(s):  EVAR/ ER    Medications prior to admission:   Prior to Admission medications    Medication Sig Start Date End Date Taking? Authorizing Provider   acetaminophen (TYLENOL) 500 MG tablet Take by mouth every 6 hours as needed    Automatic Reconciliation, Ar   allopurinol (ZYLOPRIM) 300 MG tablet Take 1 tablet by mouth daily    Automatic Reconciliation, Ar   aspirin 81 MG EC tablet Take 1 tablet by mouth daily    Automatic Reconciliation, Ar   atorvastatin (LIPITOR) 40 MG tablet Take 1 tablet by mouth nightly    Automatic Reconciliation, Ar   Calcium Carbonate-Vitamin D 600-5 MG-MCG TABS Take 1 tablet by mouth every evening    Automatic Reconciliation, Ar   vitamin D (CHOLECALCIFEROL) 25 MCG (1000 UT) TABS tablet Take 1 tablet by mouth daily    Automatic Reconciliation, Ar   gabapentin (NEURONTIN) 300 MG capsule Take by mouth 2 times daily.     Automatic Reconciliation, Ar   hydroCHLOROthiazide (HYDRODIURIL) 12.5 MG tablet Take 1 tablet by mouth daily    Automatic Reconciliation, Ar   hydroxychloroquine (PLAQUENIL) 200 MG tablet Take by mouth 2 times daily    Automatic Reconciliation, Ar   latanoprost (XALATAN) 0.005 % ophthalmic solution Apply 1 drop to eye    Automatic Reconciliation, Ar   lisinopril (PRINIVIL;ZESTRIL) 10 MG tablet Take by mouth every evening    Automatic Reconciliation, Ar   metoprolol succinate (TOPROL XL) 25 MG extended release tablet Take 1 tablet by mouth daily    Automatic Reconciliation, Ar   omeprazole (PRILOSEC) 40 MG delayed release capsule Take by mouth    Automatic Reconciliation, Ar   polyvinyl alcohol (LIQUIFILM TEARS) 1.4 % ophthalmic solution Apply 1 drop to eye 2 times daily    Automatic Reconciliation, Ar   potassium

## 2023-11-20 NOTE — ED NOTES
Pt transported on monitor with this RN and OR tech. Bedside report given in OR. Pt stable upon transport.       Juan Manuel Cuevas RN  11/20/23 [id number]

## 2023-11-20 NOTE — CONSULTS
Formal consult to follow. Patient with prior EVAR w/ BL renal snorkels. He has a known persistent T1a endoleak. There has been some sac growth. He now presents with suggestion of some fat stranding around the lateral wall of the aneurysm and some hip pain that now involves the abdomen.  The aneurysm wall itself appears to be unchanged from prior imaging     --Admit for observation  --Patient requires strict control of his BP  --[facility] Images not available for immediate review - will try to obtain these with goal of comparing the new lateral wall findings    --Vascular surgery will continue to follow closely

## 2023-11-20 NOTE — ED PROVIDER NOTES
[address]      Patient Name: Koko Leach MRN: [id number]  Birthdate 1940  Date of Evaluation: 11/20/2023  Physician: Livan Marquez MD    CHIEF COMPLAINT       Chief Complaint   Patient presents with    Abdominal Pain    Hip Pain       HISTORY OF PRESENT ILLNESS   (Location/Symptom, Timing/Onset, Context/Setting, Quality, Duration, Modifying Factors, Severity)   Koko Jordan., 82 y.o., male     82-year-old male with a history of CAD, squamous cell cancer of the face, hypertension, lupus, PAD, AAA status postrepair presents with a chief complaint of worsening abdominal pain. Patient was seen yesterday and noted to have an endograft leak. He signed out [facility] and returns today with worsening pain. Nursing Notes were reviewed. REVIEW OF SYSTEMS    (Not required)   Review of Systems   Gastrointestinal:  Positive for abdominal pain.        PAST MEDICAL HISTORY     Past Medical History:   Diagnosis Date    Arthritis     CAD (coronary artery disease) 1998    5 stents    Cancer ([address]) 2020    squamous cell, basal cell, melanoma    Coagulation disorder (HCC)     blood clotting d/o from taking naproxen    Hypertension     Lupus (HCC)     PAD (peripheral artery disease) (HCC)     stents: aorta & lower extremities    Thromboembolus ([address])        SURGICAL HISTORY       Past Surgical History:   Procedure Laterality Date    CATARACT REMOVAL Bilateral 2010    CHOLECYSTECTOMY      OTHER SURGICAL HISTORY      multiple spots of skin cancer removal    [address] [facility]    stents placed    US BIOPSY OF SALIVARY GLAND  03/04/2021    US BIOPSY OF SALIVARY GLAND 3/4/2021 [facility] RAD US    VASCULAR SURGERY  2017    IVC filter       CURRENT MEDICATIONS       Current Discharge Medication List        CONTINUE these medications which have NOT CHANGED    Details   furosemide (LASIX) 20 MG tablet Take 1 tablet by SURGERY    PROCEDURES:  Unless otherwise noted below, none     Procedures    FINAL IMPRESSION      1. Leaking abdominal aortic aneurysm (AAA) ([address])          DISPOSITION/PLAN   DISPOSITION Decision To Admit 11/20/2023 01:25:27 PM    PATIENT REFERRED TO:  No follow-up provider specified.     DISCHARGE MEDICATIONS:  Current Discharge Medication List        Controlled Substances Monitoring:          No data to display                (Please note that portions of this note were completed with a voice recognition program.  Efforts were made to edit the dictations but occasionally words are mis-transcribed.)    Yasmin Escalona MD (electronically signed)  Attending Emergency Physician           Burgess Pat MD  11/20/23 7842

## 2023-11-20 NOTE — ED TRIAGE NOTES
Patient arrives to ER with c/o bilateral hip pain radiating around stomach/back. Was seen here yesterday and was told he has a leaking aortic graft but left AMA.

## 2023-11-20 NOTE — CONSULTS
Vascular Surgery      CHIEF COMPLAINT:  Abdominal Pain    HISTORY OF PRESENT ILLNESS:              The patient is a 82 y.o. male who presents with abdominal pain. 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 Over two years ago he underwent a EVAR with bilateral renal artery snorkels. Had done well for awhile but noted to have a small gutter leak from the EVAR a little over one year ago. We elected to watch it given his chronic medical conditions which include prior head and neck cancer, lymphoma, advanced CAD and thrombocytopenia. He was last seen about one month ago and no endoleak was seen on ultrasound with a stable sac size. He now presents with a several week history of lower abdominal pain. Recent CT scan at [facility] showed an enlarged 1a endoleak. The AAA has now increased in size with abdominal pain. Past Medical History:        Diagnosis Date    Arthritis     CAD (coronary artery disease) 1998    5 stents    Cancer ([address]) 2020    squamous cell, basal cell, melanoma    Coagulation disorder (HCC)     blood clotting d/o from taking naproxen    Hypertension     Lupus (HCC)     PAD (peripheral artery disease) ([facility])     stents: aorta & lower extremities    Thromboembolus ([address])      Past Surgical History:        Procedure Laterality Date    CATARACT REMOVAL Bilateral 2010    CHOLECYSTECTOMY      OTHER SURGICAL HISTORY      multiple spots of skin cancer removal    [address] [facility]    stents placed    US BIOPSY OF SALIVARY GLAND  03/04/2021    US BIOPSY OF SALIVARY GLAND 3/4/2021 [facility] RAD US    VASCULAR SURGERY  2017    IVC filter     Current Medications:   No current facility-administered medications for this encounter.   Allergies:  Alendronate sodium, Bacitracin, Montelukast, and Naproxen    Social History:   Family History:        Problem Relation Age of Onset    No Known Problems Brother     Heart Disease Brother     No Known Problems Mother     Anesth Problems

## 2023-11-20 NOTE — ANESTHESIA PROCEDURE NOTES
Arterial Line:    An arterial line was placed using surface landmarks, in the pre-op for the following indication(s): continuous blood pressure monitoring and blood sampling needed. A 20 gauge (size) (length), Arrow (type) catheter was placed, Seldinger technique used, into the right radial artery, secured by Tegaderm. Anesthesia type: Local  Local infiltration: Injection    Events:  patient tolerated procedure well with no complications. 11/20/2023 2:15 PM11/20/2023 2:20 PM  Anesthesiologist: Viji Morin DO  Performed: Anesthesiologist   Preanesthetic Checklist  Completed: patient identified, IV checked, site marked, risks and benefits discussed, surgical/procedural consents, equipment checked, pre-op evaluation, timeout performed, anesthesia consent given, oxygen available, monitors applied/VS acknowledged and fire risk safety assessment completed and verbalized

## 2023-11-20 NOTE — BRIEF OP NOTE
Brief Postoperative Note      Patient: Gabriela Martines YOB: 1940  MRN: [id number]    Date of Procedure: 11/20/2023    Pre-Op Diagnosis Codes:     * Ruptured aneurysm of artery ([address]) [I72.9]    Post-Op Diagnosis: Same       Procedure(s):  ENDOVASCULAR AORTIC ANEURYSM REPAIR with extension of the aortic endograft and placement of bilateral renal and SMA snorkels    Surgeon(s):  Taye Barton MD    Assistant:  Surgical Assistant: Magali Stewart    Anesthesia: General    Estimated Blood Loss (mL): 413    Complications: None    Specimens:   * No specimens in log *    Implants:  Implant Name Type Inv.  Item Serial No.  Lot No. LRB No. Used Action   GRAFT VASC L60CM DIA8MM POLY MICROVEL DBL ANISHA IMPREG STR TB - [id number] Vascular grafts GRAFT VASC L60CM DIA8MM POLY MICROVEL DBL ANISHA IMPREG STR TB [id number] [employer]-WD [id number] Left 1 Implanted   STENT PERIPH L79MM DIA6-8MM CATH L135CM INTRO 7FR 0.035IN S - [id number] Peripheral stents STENT PERIPH L79MM DIA6-8MM CATH L135CM INTRO 7FR 0.035IN S [id number]  [employer]WD NA Right 1 Implanted   STENT PERIPH L79MM DIA6-8MM CATH L135CM INTRO 7FR 0.035IN S - [id number] Peripheral stents STENT PERIPH L79MM DIA6-8MM CATH L135CM INTRO 7FR 0.035IN S [id number]  [employer] [facility] NA Left 1 Implanted   STENT ENDOPROS TOT CVR L100MM PROX [id number] DST [id number] CATH - [id number] Endografts STENT ENDOPROS TOT CVR L100MM PROX [id number] DST [id number] CATH [id number] [employer] [facility] NA Left 1 Implanted   STENT PERIPH L79MM [id number] CATH [facility] 7FR - [id number] Peripheral stents STENT PERIPH L79MM [id number] CATH L135CM INTRO [facility] 7FR [id number]  [employer] [facility] NA Left 1 Implanted         Drains:   Urinary Catheter 11/20/23 2 Way (Active)       Findings: Target vessels all patent at completion, no large endoleak seen      Electronically signed by Preston Lomeli MD on 11/20/2023 at 6:39 PM

## 2023-11-20 NOTE — ED NOTES
AMA paperwork completed. PIV removed, fully intact and pressure dressing applied. Discharge instructions provided. Pt was given copy of discharge instructions with 0 paper script(s) and 0 electronic script(s). Pt verbalized understanding of the importance of following up as recommended by EDP. Pt has no further questions at this time. Wheelchair offered from treatment area to hospital entrance, pt declined. Pt leaving ED ambulatory and in stable condition.         Zoraida Lovell RN  11/19/23 1355

## 2023-11-20 NOTE — ANESTHESIA PROCEDURE NOTES
Central Venous Line:    A central venous line was placed using ultrasound guidance, in the pre-op for the following indication(s): central venous access and CVP monitoring. 11/20/2023 2:20 [id number] 2:30 PM    Sterility preparation included the following: hand hygiene performed prior to procedure, maximum sterile barriers used and sterile technique used to drape from head to toe. The patient was placed in Trendelenburg position. The right internal jugular vein was prepped. The site was prepped with Chloraprep. A 7 Fr (size), 16 (length), triple lumen was placed. During the procedure, the following specific steps were taken: target vein identified, needle advanced into vein and blood aspirated and guidewire advanced into vein. Intravenous verification was obtained by ultrasound, venous blood return and manometry. Post insertion care included: all ports aspirated, all ports flushed easily, guidewire removed intact, Biopatch applied, line sutured in place and dressing applied. During the procedure the patient experienced: patient tolerated procedure well with no complications.       Outcomes: uncomplicated  Anesthesia type: local  Staffing  Performed: Anesthesiologist   Anesthesiologist: Zara Tomlinson MD  Performed by: Zara Tomlinson MD  Authorized by: Zara Tomlinson MD    Preanesthetic Checklist  Completed: patient identified, IV checked, site marked, risks and benefits discussed, surgical/procedural consents, equipment checked, pre-op evaluation, timeout performed, anesthesia consent given, oxygen available, monitors applied/VS acknowledged and fire risk safety assessment completed and verbalized

## 2023-11-21 LAB
ANION GAP SERPL CALC-SCNC: 5 MMOL/L (ref 5–15)
BASOPHILS # BLD: 0 K/UL (ref 0–0.1)
BASOPHILS NFR BLD: 0 % (ref 0–1)
BLD PROD TYP BPU: NORMAL
BLOOD BANK DISPENSE STATUS: NORMAL
BPU ID: NORMAL
BUN SERPL-MCNC: 26 MG/DL (ref 6–20)
BUN/CREAT SERPL: 20 (ref 12–20)
CALCIUM SERPL-MCNC: 8.5 MG/DL (ref 8.5–10.1)
CHLORIDE SERPL-SCNC: 108 MMOL/L (ref 97–108)
CO2 SERPL-SCNC: 23 MMOL/L (ref 21–32)
CREAT SERPL-MCNC: 1.33 MG/DL (ref 0.7–1.3)
DIFFERENTIAL METHOD BLD: ABNORMAL
EOSINOPHIL # BLD: 0 K/UL (ref 0–0.4)
EOSINOPHIL NFR BLD: 0 % (ref 0–7)
ERYTHROCYTE [DISTWIDTH] IN BLOOD BY AUTOMATED COUNT: 17.5 % (ref 11.5–14.5)
GLUCOSE SERPL-MCNC: 141 MG/DL (ref 65–100)
HCT VFR BLD AUTO: 23.2 % (ref 36.6–50.3)
HGB BLD-MCNC: 7.8 G/DL (ref 12.1–17)
IMM GRANULOCYTES # BLD AUTO: 0 K/UL
IMM GRANULOCYTES NFR BLD AUTO: 0 %
LYMPHOCYTES # BLD: 0.3 K/UL (ref 0.8–3.5)
LYMPHOCYTES NFR BLD: 4 % (ref 12–49)
MCH RBC QN AUTO: 30.6 PG (ref 26–34)
MCHC RBC AUTO-ENTMCNC: 33.6 G/DL (ref 30–36.5)
MCV RBC AUTO: 91 FL (ref 80–99)
MONOCYTES # BLD: 0.9 K/UL (ref 0–1)
MONOCYTES NFR BLD: 11 % (ref 5–13)
MYELOCYTES NFR BLD MANUAL: 1 %
NEUTS BAND NFR BLD MANUAL: 3 % (ref 0–6)
NEUTS SEG # BLD: 7.1 K/UL (ref 1.8–8)
NEUTS SEG NFR BLD: 81 % (ref 32–75)
NRBC # BLD: 0 K/UL (ref 0–0.01)
NRBC BLD-RTO: 0 PER 100 WBC
PLATELET # BLD AUTO: 59 K/UL (ref 150–400)
PLATELET COMMENT: ABNORMAL
PMV BLD AUTO: 12.4 FL (ref 8.9–12.9)
POTASSIUM SERPL-SCNC: 4.5 MMOL/L (ref 3.5–5.1)
RBC # BLD AUTO: 2.55 M/UL (ref 4.1–5.7)
RBC MORPH BLD: ABNORMAL
SODIUM SERPL-SCNC: 136 MMOL/L (ref 136–145)
UNIT DIVISION: 0
WBC # BLD AUTO: 8.4 K/UL (ref 4.1–11.1)

## 2023-11-21 PROCEDURE — 6370000000 HC RX 637 (ALT 250 FOR IP): Performed by: SURGERY

## 2023-11-21 PROCEDURE — 2580000003 HC RX 258: Performed by: SURGERY

## 2023-11-21 PROCEDURE — 80048 BASIC METABOLIC PNL TOTAL CA: CPT

## 2023-11-21 PROCEDURE — 85025 COMPLETE CBC W/AUTO DIFF WBC: CPT

## 2023-11-21 PROCEDURE — 3E043XZ INTRODUCTION OF VASOPRESSOR INTO CENTRAL VEIN, PERCUTANEOUS APPROACH: ICD-10-PCS | Performed by: SURGERY

## 2023-11-21 PROCEDURE — 2000000000 HC ICU R&B

## 2023-11-21 PROCEDURE — 6360000002 HC RX W HCPCS: Performed by: SURGERY

## 2023-11-21 PROCEDURE — 36415 COLL VENOUS BLD VENIPUNCTURE: CPT

## 2023-11-21 RX ORDER — PANTOPRAZOLE SODIUM 40 MG/1
40 TABLET, DELAYED RELEASE ORAL
Status: DISCONTINUED | OUTPATIENT
Start: 2023-11-21 | End: 2023-11-25 | Stop reason: HOSPADM

## 2023-11-21 RX ORDER — PANTOPRAZOLE SODIUM 40 MG/1
40 TABLET, DELAYED RELEASE ORAL
Status: DISCONTINUED | OUTPATIENT
Start: 2023-11-21 | End: 2023-11-21

## 2023-11-21 RX ADMIN — Medication 1 DROP: at 20:25

## 2023-11-21 RX ADMIN — GABAPENTIN 300 MG: 300 CAPSULE ORAL at 08:13

## 2023-11-21 RX ADMIN — SODIUM CHLORIDE, PRESERVATIVE FREE 10 ML: 5 INJECTION INTRAVENOUS at 08:13

## 2023-11-21 RX ADMIN — Medication 1 TABLET: at 18:03

## 2023-11-21 RX ADMIN — Medication 1 DROP: at 08:13

## 2023-11-21 RX ADMIN — PREDNISONE 2 MG: 1 TABLET ORAL at 08:11

## 2023-11-21 RX ADMIN — HYDROXYCHLOROQUINE SULFATE 200 MG: 200 TABLET ORAL at 20:30

## 2023-11-21 RX ADMIN — POTASSIUM CHLORIDE 10 MEQ: 750 TABLET, FILM COATED, EXTENDED RELEASE ORAL at 08:11

## 2023-11-21 RX ADMIN — HYDROXYCHLOROQUINE SULFATE 200 MG: 200 TABLET ORAL at 08:11

## 2023-11-21 RX ADMIN — SODIUM CHLORIDE: 9 INJECTION, SOLUTION INTRAVENOUS at 03:00

## 2023-11-21 RX ADMIN — PANTOPRAZOLE SODIUM 40 MG: 40 TABLET, DELAYED RELEASE ORAL at 12:18

## 2023-11-21 RX ADMIN — ATORVASTATIN CALCIUM 40 MG: 40 TABLET, FILM COATED ORAL at 20:22

## 2023-11-21 RX ADMIN — SODIUM CHLORIDE, PRESERVATIVE FREE 10 ML: 5 INJECTION INTRAVENOUS at 20:23

## 2023-11-21 RX ADMIN — GABAPENTIN 300 MG: 300 CAPSULE ORAL at 20:30

## 2023-11-21 RX ADMIN — HYDROMORPHONE HYDROCHLORIDE 0.5 MG: 1 INJECTION, SOLUTION INTRAMUSCULAR; INTRAVENOUS; SUBCUTANEOUS at 03:14

## 2023-11-21 RX ADMIN — CLOPIDOGREL BISULFATE 75 MG: 75 TABLET ORAL at 08:11

## 2023-11-21 NOTE — OP NOTE
[facility]  OPERATIVE REPORT    Name:  Suma Murguia  MR#:  [id number]  :  1940  ACCOUNT #:  [de-identified]  DATE OF SERVICE:  2023      PREOPERATIVE DIAGNOSIS:  Ruptured pararenal abdominal aortic aneurysm. POSTOPERATIVE DIAGNOSIS:  Ruptured pararenal abdominal aortic aneurysm. PROCEDURES PERFORMED:  1. Endovascular aortic aneurysm repair with three-vessel snorkel placement. 2.  Percutaneous closure of the left common femoral artery. 3.  Bilateral renal artery angioplasty and stenting. 4.  Superior mesenteric artery angioplasty and stenting. 5.  Left axillary artery cutdown and surgical conduit placement. SURGEON:  Gabriela Marr MD    ASSISTANT:  .    ANESTHESIA:  General.    COMPLICATIONS:  None. SPECIMENS REMOVED:  None. IMPLANTS:  Stents listed below. ESTIMATED BLOOD LOSS:  500 mL. OPERATIVE INDICATIONS:  The patient is an 82year-old gentleman with multiple advanced chronic medical conditions. He is well-known to me as an outpatient. Approximately 2 years ago, he underwent a Endovascular aortic aneurysm repair for pararenal aortic aneurysm with endograft placement and bilateral renal artery snorkels. He did well and we have been watching his aneurysm closely. He had evidence of subtle type 1A endoleak through a gutter earlier last year, however, he had active ongoing issues secondary to his chronic anemia, advanced coronary artery disease, and chronic metastatic lymphoma. We elected to treat this conservatively. However over the past few weeks, he has been having abdominal pain and a CT scan performed 3 or 4 weeks ago showed a worsening type 1A endoleak with sac expansion. He presented to the emergency room on the day of admission with abdominal pain and stranding around his abdominal aorta concerning for rupture. Given this abdominal pain, I have recommended urgent repair.   Given the complex nature of his aneurysm, we will plan on extending his used in a standard fashion in the left common femoral artery. I then upsized to an 11-Brazilian sheath. Ultimately, a 20-Brazilian sheath was sharply inserted, thus why I preclosed the artery successfully. On the right side, a 6-Brazilian sheath was inserted and a pigtail was placed into the abdominal aorta. A stiff wire was placed from the left groin up into the thoracic aorta. I then focused on the aortic neck. Again, he had a prior endovascular aortic aneurysm repair with a large type 1A endoleak with two existing renal snorkels. I did perform an aortogram to identify the celiac artery, superior mesenteric artery and bilateral renal arteries, which were all filling. My original plan was to selectively catheterize the celiac artery to embolize it proximally. However, attempts at crossing were unsuccessful and subsequent views with selective catheter showed it to be occluded. 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 Therefore, I did not embolize the celiac artery. I first began with one of the 6-Brazilian sheath to my axillary conduit, by selectively catheterizing the superior mesenteric artery. Selective angiogram confirmed that was in the superior mesenteric artery. I then switched to a 1-cm Amplatz wire through this and tried to advance the sheath into the SMA , however, given the three sheath in the left subclavian, it would not go. I then spent time ensuring that I could see the GDA and this was marked. I then brought on the field a 7 mm x 79 VBX and this was placed on my planned location. I then went to the second 6-Brazilian sheath from above and with some difficulty, I was able to catheterize the existing right renal artery snorkel. I was able to get Glidewire all the way to the renal pelvis and angiogram with my catheter in the right renal artery confirmed I was in the renal artery.   I then switched

## 2023-11-21 NOTE — H&P
History and Physical    Date of Service:  11/21/2023  Primary Care Provider: Colleen Carroll MD  Source of information: patient, electronic medical record    Chief Complaint: Abdominal Pain and Hip Pain    PROCEDURES:   ENDOVASCULAR AORTIC ANEURYSM REPAIR with extension of the aortic endograft and placement of bilateral renal and SMA snorkels    History of Presenting Illness:   Elmer Valverde is a 82 y.o. male with a PMHx of CAD, hypertension, PAD, head and neck squamous cell cancer, and AAA status post repair (EVAR with bilateral renal artery snorkels) in 2021, who to the emergency room on 11/19 with chief complaint of worsening abdominal pain. Patient had been seen in the emergency room the night before, he was noted to have an endograft leak, signed out [facility]. Patient was admitted to ICU. Vascular surgery was consulted. He was noted to have of the AAA increased in size with increased endoleak. Patient was urgently taken to the operating room for for endovascular aortic aneurysm repair, with extension of the aortic endograft and placement of bilateral renal and SMA snorkels. He remained in ICU overnight. He was on pressors. He was weaned off Brandon-Synephrine this morning. Blood pressure is low but stable, with systolic blood pressure in the low 100s. Patient is awake, alert, denies any complaints. He has some abdominal pain, but \"nothing like it was yesterday\". The Hospitalist team has been consulted for assistance in medical management.       The patient denies any headache, blurry vision, sore throat, trouble swallowing, trouble with speech, chest pain, SOB, cough, fever, chills, N/V/D,  urinary symptoms, constipation, recent travels, sick contacts, focal or generalized neurological symptoms, falls, injuries, rashes, contact with COVID-19 diagnosed patients, hematemesis, melena, hemoptysis, hematuria, rashes, denies starting any new medications and denies any other Units Date/Time    Urine Culture Hold Sample [[id number]] Collected: 11/20/23 1126    Order Status: Completed Specimen: Urine Updated: 11/20/23 1136     Specimen HOld       Urine on hold in Microbiology dept for 2 days. If unpreserved urine is submitted, it cannot be used for addtional testing after 24 hours, recollection will be required. IMAGING:   XR CHEST PORTABLE   Final Result   There is a RIGHT IJ catheter with the tip in the upper SVC. There is no   pneumothorax. [place] XR TECHNOLOGIST SERVICE   Final Result      CT ABDOMEN PELVIS WO CONTRAST Additional Contrast? None   Final Result   No change since recent study, no acute findings suspected. ECG/ECHO:    No results found for this or any previous visit (from the past 4464 hour(s)). No results found for this or any previous visit. Notes reviewed from all clinical/nonclinical/nursing services involved in patient's clinical care. Care coordination discussions were held with appropriate clinical/nonclinical/ nursing providers based on care coordination needs. Assessment:   Given the patient's current clinical presentation, there is a high level of concern for decompensation if discharged from the emergency department. Complex decision making was performed, which includes reviewing the patient's available past medical records, laboratory results, and imaging studies. Principal Problem:    Ruptured abdominal aortic aneurysm (AAA) ([address])  Resolved Problems:    * No resolved hospital problems. *      Plan:     Ruptured AAA:  -ENDOVASCULAR AORTIC ANEURYSM REPAIR with extension of the aortic endograft and placement of bilateral renal and SMA snorkels on 11/20;  -Postop care per vascular surgery;  -Agree with current rate of IVFs: NS at 100 ml/h;   -Continue close BP monitoring;  -Maintain systolic blood pressure above 100 mmHg;      Hypertension:  -All home BP medications on hold due to hypotension;     CAD:  -Continue home or directed the management of the following life and organ supporting interventions that required my frequent assessment to treat or prevent imminent deterioration. I personally spent 50 minutes of critical care time. This is time spent at this critically ill patient's bedside actively involved in patient care as well as the coordination of care and discussions with the patient's family. This does not include any procedural time which has been billed separately. Signed By: Peter Kohler MD     November 21, 2023         Please note that this dictation may have been completed with Dragon, the [employer] voice recognition software. Quite often unanticipated grammatical, syntax, homophones, and other interpretive errors are inadvertently transcribed by the computer software. Please disregard these errors. Please excuse any errors that have escaped final proofreading.

## 2023-11-21 NOTE — PROGRESS NOTES
Overall looks great, abdominal pain much better. Vitals reviewed, low dose nona  Awake and oriented  Left chest soft  Bilateral groins soft    Labs reveiwed    83 y/o WM with ruptured AAA, s/p EVAR with 3 vessel snorkel  - Overall doing well, pain better. Appreciate ICU care. Wean nona to keep SBP>100.   - Ruptured AAA- activity ad gisselle, labs tomorrow  - ZULEIMA- Likely ATN from contrast/hypotension. Continue IVF and garcia, hold lasix for now. - Acute on Chronic blood loss anemia- Hgb stable today, recheck am labs  - Chronic thrombocytopenia- Stable at 59, no signs of bleeding so no need for transfusion.   - Advanced CAD- continue asa/plavix, hold coumadin  - Chronic steroid use- continue home med  - Mamadou Gutierrez to transfer to stepdown when off nona, will need medicine consult for his multiple chronic medical problems.

## 2023-11-21 NOTE — CARE COORDINATION
Care Management Initial Assessment       RUR: 16% Moderate Risk  Readmission? No  1st IM letter given? Yes - 11/21/2023  1st  letter given: No       11/21/23 1546   Service Assessment   Patient Orientation Alert and Oriented   Cognition Alert   History Provided By Patient   Primary Caregiver Self   Support Systems Spouse/Significant Other;Children   Patient's Healthcare Decision Maker is: Legal Next of Kin   PCP Verified by CM Yes   Last Visit to PCP Within last 3 months   Prior Functional Level Independent in ADLs/IADLs   Current Functional Level Independent in ADLs/IADLs   Can patient return to prior living arrangement Yes   Ability to make needs known: Good   Family able to assist with home care needs: Yes   Would you like for me to discuss the discharge plan with any other family members/significant others, and if so, who? Yes   Financial Resources Medicare   Social/Functional History   Lives With Spouse   Type of [address] [place] [address] Dr One level   [address] to enter with rails   Entrance Stairs - Number of Steps 1   Entrance Stairs - Rails Right   Bathroom Shower/Tub Tub/Shower unit; Walk-in shower   Bathroom Toilet Standard   Bathroom Equipment Shower chair;Grab bars in shower   Bathroom Accessibility Wheelchair accessible   Home Equipment None   Receives Help From [employer] Responsibilities No   Ambulation Assistance Independent   Transfer Assistance Independent   Active  Yes   Mode of Transportation Car;Truck   Education High School Graduate   Occupation Retired   Discharge Planning   Type of [address] [place] [address] Prior To Admission None   Potential Assistance Needed N/A   DME Ordered?  No   Potential Assistance Purchasing Medications No   Type of Home Care Services None   Patient expects to be discharged to: House   One/Two Story Residence One story History of falls? 1   Services At/After Discharge   Transition of Care Consult (CM Consult) [address] [facility] Provided? No   Mode of Transport at Discharge Self   Confirm Follow Up Transport Self     Mr. Negrita Washington was seen with his daughter in his room. His address, phone number, and insurance were verified. She lives with his spouse in a 1 story, single family residence with 1 step to enter. His pharmacy is [facility] in [place]. He is able to afford and acquire his medications. He may need home health when he is medically stable for discharge. His family will be providing transportation when he is medically stable for discharge. Will continue to follow for discharge planning.   Kailey Newman LCSW

## 2023-11-21 NOTE — PROGRESS NOTES
2212 pt sat up for swallow screen and PO meds. SBP went from 120s to 60s. Pt laid flat again & PO meds held for now.

## 2023-11-22 LAB
ANION GAP SERPL CALC-SCNC: 6 MMOL/L (ref 5–15)
BASOPHILS # BLD: 0 K/UL (ref 0–0.1)
BASOPHILS # BLD: 0 K/UL (ref 0–0.1)
BASOPHILS NFR BLD: 0 % (ref 0–1)
BASOPHILS NFR BLD: 0 % (ref 0–1)
BUN SERPL-MCNC: 37 MG/DL (ref 6–20)
BUN/CREAT SERPL: 29 (ref 12–20)
CALCIUM SERPL-MCNC: 7.6 MG/DL (ref 8.5–10.1)
CHLORIDE SERPL-SCNC: 107 MMOL/L (ref 97–108)
CO2 SERPL-SCNC: 23 MMOL/L (ref 21–32)
CREAT SERPL-MCNC: 1.27 MG/DL (ref 0.7–1.3)
DIFFERENTIAL METHOD BLD: ABNORMAL
DIFFERENTIAL METHOD BLD: ABNORMAL
EOSINOPHIL # BLD: 0 K/UL (ref 0–0.4)
EOSINOPHIL # BLD: 0 K/UL (ref 0–0.4)
EOSINOPHIL NFR BLD: 0 % (ref 0–7)
EOSINOPHIL NFR BLD: 1 % (ref 0–7)
ERYTHROCYTE [DISTWIDTH] IN BLOOD BY AUTOMATED COUNT: 16.1 % (ref 11.5–14.5)
ERYTHROCYTE [DISTWIDTH] IN BLOOD BY AUTOMATED COUNT: 17.3 % (ref 11.5–14.5)
FERRITIN SERPL-MCNC: 1105 NG/ML (ref 26–388)
FOLATE SERPL-MCNC: 19.7 NG/ML (ref 5–21)
GLUCOSE SERPL-MCNC: 94 MG/DL (ref 65–100)
HCT VFR BLD AUTO: 16 % (ref 36.6–50.3)
HCT VFR BLD AUTO: 22.7 % (ref 36.6–50.3)
HCT VFR BLD AUTO: 23.9 % (ref 36.6–50.3)
HGB BLD-MCNC: 5.3 G/DL (ref 12.1–17)
HGB BLD-MCNC: 7.6 G/DL (ref 12.1–17)
HGB BLD-MCNC: 7.9 G/DL (ref 12.1–17)
HISTORY CHECK: NORMAL
IMM GRANULOCYTES # BLD AUTO: 0 K/UL
IMM GRANULOCYTES # BLD AUTO: 0 K/UL
IMM GRANULOCYTES NFR BLD AUTO: 0 %
IMM GRANULOCYTES NFR BLD AUTO: 0 %
IRON SATN MFR SERPL: 24 % (ref 20–50)
IRON SERPL-MCNC: 29 UG/DL (ref 35–150)
LYMPHOCYTES # BLD: 0.1 K/UL (ref 0.8–3.5)
LYMPHOCYTES # BLD: 0.4 K/UL (ref 0.8–3.5)
LYMPHOCYTES NFR BLD: 2 % (ref 12–49)
LYMPHOCYTES NFR BLD: 8 % (ref 12–49)
MAGNESIUM SERPL-MCNC: 1.8 MG/DL (ref 1.6–2.4)
MCH RBC QN AUTO: 30.5 PG (ref 26–34)
MCH RBC QN AUTO: 31 PG (ref 26–34)
MCHC RBC AUTO-ENTMCNC: 33.1 G/DL (ref 30–36.5)
MCHC RBC AUTO-ENTMCNC: 33.1 G/DL (ref 30–36.5)
MCV RBC AUTO: 92.3 FL (ref 80–99)
MCV RBC AUTO: 93.6 FL (ref 80–99)
METAMYELOCYTES NFR BLD MANUAL: 2 %
METAMYELOCYTES NFR BLD MANUAL: 4 %
MONOCYTES # BLD: 0.7 K/UL (ref 0–1)
MONOCYTES # BLD: 0.7 K/UL (ref 0–1)
MONOCYTES NFR BLD: 11 % (ref 5–13)
MONOCYTES NFR BLD: 15 % (ref 5–13)
MYELOCYTES NFR BLD MANUAL: 1 %
NEUTS BAND NFR BLD MANUAL: 1 % (ref 0–6)
NEUTS BAND NFR BLD MANUAL: 2 % (ref 0–6)
NEUTS SEG # BLD: 3.5 K/UL (ref 1.8–8)
NEUTS SEG # BLD: 5.1 K/UL (ref 1.8–8)
NEUTS SEG NFR BLD: 71 % (ref 32–75)
NEUTS SEG NFR BLD: 82 % (ref 32–75)
NRBC # BLD: 0 K/UL (ref 0–0.01)
NRBC # BLD: 0 K/UL (ref 0–0.01)
NRBC BLD-RTO: 0 PER 100 WBC
NRBC BLD-RTO: 0 PER 100 WBC
PHOSPHATE SERPL-MCNC: 3.5 MG/DL (ref 2.6–4.7)
PLATELET # BLD AUTO: 46 K/UL (ref 150–400)
PLATELET # BLD AUTO: 50 K/UL (ref 150–400)
PLATELET COMMENT: ABNORMAL
PMV BLD AUTO: 11.8 FL (ref 8.9–12.9)
PMV BLD AUTO: 11.8 FL (ref 8.9–12.9)
POTASSIUM SERPL-SCNC: 4.2 MMOL/L (ref 3.5–5.1)
RBC # BLD AUTO: 1.71 M/UL (ref 4.1–5.7)
RBC # BLD AUTO: 2.59 M/UL (ref 4.1–5.7)
RBC MORPH BLD: ABNORMAL
SODIUM SERPL-SCNC: 136 MMOL/L (ref 136–145)
TIBC SERPL-MCNC: 119 UG/DL (ref 250–450)
VIT B12 SERPL-MCNC: 1315 PG/ML (ref 193–986)
WBC # BLD AUTO: 4.8 K/UL (ref 4.1–11.1)
WBC # BLD AUTO: 6.1 K/UL (ref 4.1–11.1)

## 2023-11-22 PROCEDURE — 36430 TRANSFUSION BLD/BLD COMPNT: CPT

## 2023-11-22 PROCEDURE — P9016 RBC LEUKOCYTES REDUCED: HCPCS

## 2023-11-22 PROCEDURE — 97161 PT EVAL LOW COMPLEX 20 MIN: CPT

## 2023-11-22 PROCEDURE — 83735 ASSAY OF MAGNESIUM: CPT

## 2023-11-22 PROCEDURE — 80048 BASIC METABOLIC PNL TOTAL CA: CPT

## 2023-11-22 PROCEDURE — 85018 HEMOGLOBIN: CPT

## 2023-11-22 PROCEDURE — 83550 IRON BINDING TEST: CPT

## 2023-11-22 PROCEDURE — 6370000000 HC RX 637 (ALT 250 FOR IP): Performed by: INTERNAL MEDICINE

## 2023-11-22 PROCEDURE — 97530 THERAPEUTIC ACTIVITIES: CPT

## 2023-11-22 PROCEDURE — 85025 COMPLETE CBC W/AUTO DIFF WBC: CPT

## 2023-11-22 PROCEDURE — 82728 ASSAY OF FERRITIN: CPT

## 2023-11-22 PROCEDURE — 97165 OT EVAL LOW COMPLEX 30 MIN: CPT

## 2023-11-22 PROCEDURE — 82607 VITAMIN B-12: CPT

## 2023-11-22 PROCEDURE — 85014 HEMATOCRIT: CPT

## 2023-11-22 PROCEDURE — 83540 ASSAY OF IRON: CPT

## 2023-11-22 PROCEDURE — 6370000000 HC RX 637 (ALT 250 FOR IP): Performed by: SURGERY

## 2023-11-22 PROCEDURE — 82746 ASSAY OF FOLIC ACID SERUM: CPT

## 2023-11-22 PROCEDURE — 2580000003 HC RX 258: Performed by: SURGERY

## 2023-11-22 PROCEDURE — 36415 COLL VENOUS BLD VENIPUNCTURE: CPT

## 2023-11-22 PROCEDURE — 84100 ASSAY OF PHOSPHORUS: CPT

## 2023-11-22 PROCEDURE — 2060000000 HC ICU INTERMEDIATE R&B

## 2023-11-22 RX ORDER — ALLOPURINOL 100 MG/1
300 TABLET ORAL DAILY
Status: DISCONTINUED | OUTPATIENT
Start: 2023-11-22 | End: 2023-11-25 | Stop reason: HOSPADM

## 2023-11-22 RX ORDER — SODIUM CHLORIDE 9 MG/ML
INJECTION, SOLUTION INTRAVENOUS PRN
Status: DISCONTINUED | OUTPATIENT
Start: 2023-11-22 | End: 2023-11-23 | Stop reason: SDUPTHER

## 2023-11-22 RX ORDER — LISINOPRIL 10 MG/1
10 TABLET ORAL DAILY
Status: DISCONTINUED | OUTPATIENT
Start: 2023-11-23 | End: 2023-11-25 | Stop reason: HOSPADM

## 2023-11-22 RX ORDER — LISINOPRIL 10 MG/1
10 TABLET ORAL DAILY
Status: DISCONTINUED | OUTPATIENT
Start: 2023-11-22 | End: 2023-11-22

## 2023-11-22 RX ORDER — LATANOPROST 50 UG/ML
1 SOLUTION/ DROPS OPHTHALMIC DAILY
Status: DISCONTINUED | OUTPATIENT
Start: 2023-11-22 | End: 2023-11-23

## 2023-11-22 RX ADMIN — SODIUM CHLORIDE: 9 INJECTION, SOLUTION INTRAVENOUS at 01:31

## 2023-11-22 RX ADMIN — POTASSIUM CHLORIDE 10 MEQ: 750 TABLET, FILM COATED, EXTENDED RELEASE ORAL at 08:26

## 2023-11-22 RX ADMIN — SODIUM CHLORIDE, PRESERVATIVE FREE 10 ML: 5 INJECTION INTRAVENOUS at 08:26

## 2023-11-22 RX ADMIN — PREDNISONE 2 MG: 1 TABLET ORAL at 08:26

## 2023-11-22 RX ADMIN — PANTOPRAZOLE SODIUM 40 MG: 40 TABLET, DELAYED RELEASE ORAL at 06:49

## 2023-11-22 RX ADMIN — CLOPIDOGREL BISULFATE 75 MG: 75 TABLET ORAL at 08:26

## 2023-11-22 RX ADMIN — LATANOPROST 1 DROP: 50 SOLUTION OPHTHALMIC at 18:42

## 2023-11-22 RX ADMIN — Medication 1 DROP: at 08:28

## 2023-11-22 RX ADMIN — Medication 1 DROP: at 19:02

## 2023-11-22 RX ADMIN — GABAPENTIN 300 MG: 300 CAPSULE ORAL at 08:25

## 2023-11-22 RX ADMIN — HYDROXYCHLOROQUINE SULFATE 200 MG: 200 TABLET ORAL at 20:51

## 2023-11-22 RX ADMIN — Medication 1 TABLET: at 18:40

## 2023-11-22 RX ADMIN — HYDROXYCHLOROQUINE SULFATE 200 MG: 200 TABLET ORAL at 08:25

## 2023-11-22 RX ADMIN — ATORVASTATIN CALCIUM 40 MG: 40 TABLET, FILM COATED ORAL at 20:51

## 2023-11-22 RX ADMIN — PANTOPRAZOLE SODIUM 40 MG: 40 TABLET, DELAYED RELEASE ORAL at 16:02

## 2023-11-22 RX ADMIN — METOPROLOL TARTRATE 25 MG: 25 TABLET, FILM COATED ORAL at 18:40

## 2023-11-22 RX ADMIN — GABAPENTIN 300 MG: 300 CAPSULE ORAL at 20:51

## 2023-11-22 RX ADMIN — SODIUM CHLORIDE, PRESERVATIVE FREE 10 ML: 5 INJECTION INTRAVENOUS at 20:51

## 2023-11-22 RX ADMIN — ALLOPURINOL 300 MG: 100 TABLET ORAL at 18:40

## 2023-11-22 NOTE — PROGRESS NOTES
No complaints, pain free now. Hgb down getting blood  Vitals stable, off pressors. Awake and oriented  Left chest soft  Bilateral groins soft     Labs reveiwed     83 y/o WM with ruptured pararenal AAA, s/p EVAR with 3 vessel snorkel  - Overall continues to do well, pain resolved. Goal SBP>100.   - ZULEIMA- Likely ATN from contrast/hypotension. Better today, will d/c IVF given the blood volume. Hold lasix today also. D/c garcia  - Acute on Chronic blood loss anemia- Hgb drop expected, no signs of bleeding.  2 units this am. Recheck in the am.   - Chronic thrombocytopenia- Stable, no signs of bleeding so no need for transfusion.   - Advanced CAD- continue home meds  - Chronic steroid use- continue home med  - Transfer orders in, appreciate medicine input  - PT/OT

## 2023-11-22 NOTE — PROGRESS NOTES
[address] [facility]  Hospitalist Group                                                                                          Hospitalist Progress Note  Sarah Tong MD  Answering service: [phone] from in house phone        Date of Service:  2023  NAME:  Nanci Plascencia :  1940  MRN:  [id number]       Admission Summary:   Nanci Plascencia is a 82 y.o. male with a PMHx of CAD, hypertension, PAD, head and neck squamous cell cancer, and AAA status post repair (EVAR with bilateral renal artery snorkels) in , who to the emergency room on  with chief complaint of worsening abdominal pain. Patient had been seen in the emergency room the night before, he was noted to have an endograft leak, signed out [facility]. Patient was admitted to ICU. Vascular surgery was consulted. He was noted to have of the AAA increased in size with increased endoleak. Patient was urgently taken to the operating room for for endovascular aortic aneurysm repair, with extension of the aortic endograft and placement of bilateral renal and SMA snorkels. He remained in ICU overnight. He was on pressors. He was weaned off Brandon-Synephrine this morning. Blood pressure is low but stable, with systolic blood pressure in the low 100s. Patient is awake, alert, denies any complaints. He has some abdominal pain, but \"nothing like it was yesterday\". The Hospitalist team has been consulted for assistance in medical management. PROCEDURES:   :  ENDOVASCULAR AORTIC ANEURYSM REPAIR with extension of the aortic endograft and placement of bilateral renal and SMA snorkels       Interval history / Subjective:   Blood pressure is elevated this afternoon, but SBP has been in the 120-130's most of the day, until he worked with PT/OT. Patient was on pressors until  AM, and hypotensive throughout the day.      Assessment & Plan:           Ruptured AAA:  -ENDOVASCULAR AORTIC \"PH\", \"PCO2\", \"PO2\" in the last 72 hours. No results for input(s): \"CPK\" in the last 72 hours. Invalid input(s): \"CPKMB\", \"CKNDX\", \"TROIQ\"  No results found for: \"CHOL\", \"CHOLX\", \"CHLST\", \"CHOLV\", \"HDL\", \"HDLC\", \"LDL\", \"LDLC\", \"TGLX\", \"TRIGL\"  No results found for: \"GLUCPOC\"  [unfilled]    Notes reviewed from all clinical/nonclinical/nursing services involved in patient's clinical care. Care coordination discussions were held with appropriate clinical/nonclinical/ nursing providers based on care coordination needs. Patients current active Medications were reviewed, considered, added and adjusted based on the clinical condition today. Home Medications were reconciled to the best of my ability given all available resources at the time of admission. Route is PO if not otherwise noted.       Admission Status:[id number]:::1}      Medications Reviewed:     Current Facility-Administered Medications   Medication Dose Route Frequency    0.9 % sodium chloride infusion   IntraVENous PRN    pantoprazole (PROTONIX) tablet 40 mg  40 mg Oral BID AC    0.9 % sodium chloride infusion   IntraVENous PRN    sodium chloride flush 0.9 % injection 5-40 mL  5-40 mL IntraVENous 2 times per day    sodium chloride flush 0.9 % injection 5-40 mL  5-40 mL IntraVENous PRN    0.9 % sodium chloride infusion   IntraVENous PRN    aspirin EC tablet 81 mg  81 mg Oral Daily    oxyCODONE (ROXICODONE) immediate release tablet 5 mg  5 mg Oral Q4H PRN    Or    oxyCODONE (ROXICODONE) immediate release tablet 10 mg  10 mg Oral Q4H PRN    HYDROmorphone HCl PF (DILAUDID) injection 0.5 mg  0.5 mg IntraVENous Q3H PRN    ondansetron (ZOFRAN-ODT) disintegrating tablet 4 mg  4 mg Oral Q8H PRN    Or    ondansetron (ZOFRAN) injection 4 mg  4 mg IntraVENous Q6H PRN    hydrALAZINE (APRESOLINE) injection 10 mg  10 mg IntraVENous Q4H PRN    atorvastatin (LIPITOR) tablet 40 mg  40 mg Oral Nightly    oyster shell calcium w/D 500-5 MG-MCG tablet 1 tablet  1 tablet

## 2023-11-22 NOTE — PLAN OF CARE
Problem: Pain  Goal: Verbalizes/displays adequate comfort level or baseline comfort level  Outcome: Progressing     Problem: Discharge Planning  Goal: Discharge to home or other facility with appropriate resources  Outcome: Progressing     Problem: Skin/Tissue Integrity  Goal: Absence of new skin breakdown  Description: 1. Monitor for areas of redness and/or skin breakdown  2. Assess vascular access sites hourly  3. Every 4-6 hours minimum:  Change oxygen saturation probe site  4. Every 4-6 hours:  If on nasal continuous positive airway pressure, respiratory therapy assess nares and determine need for appliance change or resting period.   Outcome: Progressing     Problem: Safety - Adult  Goal: Free from fall injury  Outcome: Progressing     Problem: ABCDS Injury Assessment  Goal: Absence of physical injury  Outcome: Progressing

## 2023-11-22 NOTE — PROGRESS NOTES
1930) Bedside and Verbal shift change report given to Jose Aparicio RN (oncoming nurse) by Faheem Gutierrez RN (offgoing nurse). Report included the following information Nurse Handoff Report, Index, Surgery Report, Intake/Output, MAR, Recent Results, Cardiac Rhythm NSR, and Neuro Assessment. 0340) Lab called with critical result of hgb 5.2, hct 15.8, and platelet 45. Labs redrawn to confirm results. 0425) Lab redraw resulted and hgb 5.3, hct 16 and platelets 45. Results reported to Felicity Mueller NP via perfect serve     1469) Order for 2u PRBC obtained. Blood consent and cross match already verified from 11/20.     0615) Dr. David Nam at the bedside. Discussed events overnight. Verbal orders to d/c garcia, fluids and d/c central line post transfusion. 0730) Bedside and Verbal shift change report given to Faheem Gutierrez RN (oncoming nurse) by Jose Aparicio RN (offgoing nurse). Report included the following information Nurse Handoff Report, Index, Intake/Output, MAR, Recent Results, Cardiac Rhythm NSR, and Neuro Assessment.

## 2023-11-23 LAB
ANION GAP SERPL CALC-SCNC: 3 MMOL/L (ref 5–15)
BUN SERPL-MCNC: 21 MG/DL (ref 6–20)
BUN/CREAT SERPL: 25 (ref 12–20)
CALCIUM SERPL-MCNC: 7.8 MG/DL (ref 8.5–10.1)
CHLORIDE SERPL-SCNC: 109 MMOL/L (ref 97–108)
CO2 SERPL-SCNC: 23 MMOL/L (ref 21–32)
CREAT SERPL-MCNC: 0.83 MG/DL (ref 0.7–1.3)
ERYTHROCYTE [DISTWIDTH] IN BLOOD BY AUTOMATED COUNT: 15.9 % (ref 11.5–14.5)
GLUCOSE SERPL-MCNC: 81 MG/DL (ref 65–100)
HCT VFR BLD AUTO: 22 % (ref 36.6–50.3)
HCT VFR BLD AUTO: 22.6 % (ref 36.6–50.3)
HCT VFR BLD AUTO: 22.9 % (ref 36.6–50.3)
HGB BLD-MCNC: 7.1 G/DL (ref 12.1–17)
HGB BLD-MCNC: 7.4 G/DL (ref 12.1–17)
HGB BLD-MCNC: 7.6 G/DL (ref 12.1–17)
HISTORY CHECK: NORMAL
MAGNESIUM SERPL-MCNC: 1.9 MG/DL (ref 1.6–2.4)
MCH RBC QN AUTO: 30.8 PG (ref 26–34)
MCHC RBC AUTO-ENTMCNC: 32.7 G/DL (ref 30–36.5)
MCV RBC AUTO: 94.2 FL (ref 80–99)
NRBC # BLD: 0 K/UL (ref 0–0.01)
NRBC BLD-RTO: 0 PER 100 WBC
PHOSPHATE SERPL-MCNC: 2.3 MG/DL (ref 2.6–4.7)
PLATELET # BLD AUTO: 52 K/UL (ref 150–400)
PMV BLD AUTO: 11.5 FL (ref 8.9–12.9)
POTASSIUM SERPL-SCNC: 4.4 MMOL/L (ref 3.5–5.1)
RBC # BLD AUTO: 2.4 M/UL (ref 4.1–5.7)
SODIUM SERPL-SCNC: 135 MMOL/L (ref 136–145)
WBC # BLD AUTO: 5.1 K/UL (ref 4.1–11.1)

## 2023-11-23 PROCEDURE — 36430 TRANSFUSION BLD/BLD COMPNT: CPT

## 2023-11-23 PROCEDURE — 6370000000 HC RX 637 (ALT 250 FOR IP): Performed by: INTERNAL MEDICINE

## 2023-11-23 PROCEDURE — 6370000000 HC RX 637 (ALT 250 FOR IP): Performed by: SURGERY

## 2023-11-23 PROCEDURE — 2580000003 HC RX 258: Performed by: SURGERY

## 2023-11-23 PROCEDURE — 85027 COMPLETE CBC AUTOMATED: CPT

## 2023-11-23 PROCEDURE — 85018 HEMOGLOBIN: CPT

## 2023-11-23 PROCEDURE — 1100000000 HC RM PRIVATE

## 2023-11-23 PROCEDURE — 84100 ASSAY OF PHOSPHORUS: CPT

## 2023-11-23 PROCEDURE — 83735 ASSAY OF MAGNESIUM: CPT

## 2023-11-23 PROCEDURE — 85014 HEMATOCRIT: CPT

## 2023-11-23 PROCEDURE — P9016 RBC LEUKOCYTES REDUCED: HCPCS

## 2023-11-23 PROCEDURE — 36415 COLL VENOUS BLD VENIPUNCTURE: CPT

## 2023-11-23 PROCEDURE — 80048 BASIC METABOLIC PNL TOTAL CA: CPT

## 2023-11-23 RX ORDER — LATANOPROST 50 UG/ML
1 SOLUTION/ DROPS OPHTHALMIC NIGHTLY
Status: DISCONTINUED | OUTPATIENT
Start: 2023-11-23 | End: 2023-11-25 | Stop reason: HOSPADM

## 2023-11-23 RX ORDER — SODIUM CHLORIDE 9 MG/ML
INJECTION, SOLUTION INTRAVENOUS PRN
Status: DISCONTINUED | OUTPATIENT
Start: 2023-11-23 | End: 2023-11-25 | Stop reason: HOSPADM

## 2023-11-23 RX ORDER — DOCUSATE SODIUM 100 MG/1
200 CAPSULE, LIQUID FILLED ORAL 2 TIMES DAILY
COMMUNITY

## 2023-11-23 RX ADMIN — SODIUM CHLORIDE, PRESERVATIVE FREE 10 ML: 5 INJECTION INTRAVENOUS at 20:13

## 2023-11-23 RX ADMIN — Medication 1 DROP: at 10:27

## 2023-11-23 RX ADMIN — SODIUM CHLORIDE, PRESERVATIVE FREE 10 ML: 5 INJECTION INTRAVENOUS at 10:27

## 2023-11-23 RX ADMIN — Medication 1 TABLET: at 20:18

## 2023-11-23 RX ADMIN — PREDNISONE 2 MG: 1 TABLET ORAL at 10:33

## 2023-11-23 RX ADMIN — ALLOPURINOL 300 MG: 100 TABLET ORAL at 10:24

## 2023-11-23 RX ADMIN — METOPROLOL SUCCINATE 25 MG: 25 TABLET, EXTENDED RELEASE ORAL at 10:22

## 2023-11-23 RX ADMIN — Medication 1 DROP: at 20:19

## 2023-11-23 RX ADMIN — HYDROXYCHLOROQUINE SULFATE 200 MG: 200 TABLET ORAL at 21:15

## 2023-11-23 RX ADMIN — LISINOPRIL 10 MG: 10 TABLET ORAL at 10:25

## 2023-11-23 RX ADMIN — LATANOPROST 1 DROP: 50 SOLUTION/ DROPS OPHTHALMIC at 20:19

## 2023-11-23 RX ADMIN — GABAPENTIN 300 MG: 300 CAPSULE ORAL at 10:25

## 2023-11-23 RX ADMIN — GABAPENTIN 300 MG: 300 CAPSULE ORAL at 21:15

## 2023-11-23 RX ADMIN — ATORVASTATIN CALCIUM 40 MG: 40 TABLET, FILM COATED ORAL at 20:13

## 2023-11-23 RX ADMIN — HYDROXYCHLOROQUINE SULFATE 200 MG: 200 TABLET ORAL at 10:23

## 2023-11-23 RX ADMIN — PANTOPRAZOLE SODIUM 40 MG: 40 TABLET, DELAYED RELEASE ORAL at 07:19

## 2023-11-23 RX ADMIN — POTASSIUM CHLORIDE 10 MEQ: 750 TABLET, FILM COATED, EXTENDED RELEASE ORAL at 10:22

## 2023-11-23 RX ADMIN — LISINOPRIL 10 MG: 10 TABLET ORAL at 20:18

## 2023-11-23 RX ADMIN — CLOPIDOGREL BISULFATE 75 MG: 75 TABLET ORAL at 10:23

## 2023-11-23 NOTE — PROGRESS NOTES
behalf of the hospitalist team, thank you for involving us in the care of this patient. The hospitalist team will continue to follow alongside. DIET: ADULT DIET; Regular   ISOLATION PRECAUTIONS: No active isolations  CODE STATUS: full  DVT PROPHYLAXIS: SCD's or Sequential Compression Device  ANTICIPATED DISCHARGE: Home in 2-3 days  EMERGENCY CONTACT/SURROGATE DECISION MAKER: wife Kyrie Conley;   Care Plan discussed with: patient; Ok to transfer to Intermediate Care;          Principal Problem:    Ruptured abdominal aortic aneurysm (AAA) ([address])  Resolved Problems:    * No resolved hospital problems. *         Social Determinants of Health     Tobacco Use: Medium Risk (11/21/2023)    Patient History     Smoking Tobacco Use: Former     Smokeless Tobacco Use: Never     Passive Exposure: Not on file   Alcohol Use: Not on file   Financial Resource Strain: Not on file   Food Insecurity: Not on file   Transportation Needs: Not on file   Physical Activity: Not on file   Stress: Not on file   Social Connections: Not on file   Intimate Partner Violence: Not on file   Depression: Not on file   Housing Stability: Not on file   Interpersonal Safety: Not on file   Utilities: Not on file       Review of Systems:   Pertinent items are noted in HPI. Vital Signs:    Last 24hrs VS reviewed since prior progress note.  Most recent are:  Vitals:    11/23/23 1030   BP: 138/77   Pulse:    Resp:    Temp:    SpO2:          Intake/Output Summary (Last 24 hours) at 11/23/2023 1323  Last data filed at 11/23/2023 1158  Gross per 24 hour   Intake 200 ml   Output 2200 ml   Net -2000 ml          Physical Examination:     I had a face to face encounter with this patient and independently examined them on 11/23/2023 as outlined below:          General : alert x 3, awake, no acute distress,   HEENT: PEERL, EOMI, moist mucus membrane, TM clear  Neck: supple, no JVD, no meningeal signs  Chest: Clear to auscultation bilaterally   CVS: S1 Medications were reviewed, considered, added and adjusted based on the clinical condition today. Home Medications were reconciled to the best of my ability given all available resources at the time of admission. Route is PO if not otherwise noted.       Admission Status:[id number]:::1}      Medications Reviewed:     Current Facility-Administered Medications   Medication Dose Route Frequency    latanoprost (XALATAN) 0.005 % ophthalmic solution 1 drop  1 drop Both Eyes Nightly    0.9 % sodium chloride infusion   IntraVENous PRN    allopurinol (ZYLOPRIM) tablet 300 mg  300 mg Oral Daily    lisinopril (PRINIVIL;ZESTRIL) tablet 10 mg  10 mg Oral Daily    pantoprazole (PROTONIX) tablet 40 mg  40 mg Oral BID AC    0.9 % sodium chloride infusion   IntraVENous PRN    sodium chloride flush 0.9 % injection 5-40 mL  5-40 mL IntraVENous 2 times per day    sodium chloride flush 0.9 % injection 5-40 mL  5-40 mL IntraVENous PRN    0.9 % sodium chloride infusion   IntraVENous PRN    aspirin EC tablet 81 mg  81 mg Oral Daily    oxyCODONE (ROXICODONE) immediate release tablet 5 mg  5 mg Oral Q4H PRN    Or    oxyCODONE (ROXICODONE) immediate release tablet 10 mg  10 mg Oral Q4H PRN    HYDROmorphone HCl PF (DILAUDID) injection 0.5 mg  0.5 mg IntraVENous Q3H PRN    ondansetron (ZOFRAN-ODT) disintegrating tablet 4 mg  4 mg Oral Q8H PRN    Or    ondansetron (ZOFRAN) injection 4 mg  4 mg IntraVENous Q6H PRN    hydrALAZINE (APRESOLINE) injection 10 mg  10 mg IntraVENous Q4H PRN    atorvastatin (LIPITOR) tablet 40 mg  40 mg Oral Nightly    oyster shell calcium w/D 500-5 MG-MCG tablet 1 tablet  1 tablet Oral QPM    clopidogrel (PLAVIX) tablet 75 mg  75 mg Oral Daily    [Held by provider] furosemide (LASIX) tablet 20 mg  20 mg Oral BID    gabapentin (NEURONTIN) capsule 300 mg  300 mg Oral BID    hydroxychloroquine (PLAQUENIL) tablet 200 mg  200 mg Oral BID    metoprolol succinate (TOPROL XL) extended release tablet 25 mg  25 mg Oral Daily

## 2023-11-23 NOTE — PLAN OF CARE
Problem: Pain  Goal: Verbalizes/displays adequate comfort level or baseline comfort level  Outcome: Progressing     Problem: Discharge Planning  Goal: Discharge to home or other facility with appropriate resources  Outcome: Progressing     Problem: Skin/Tissue Integrity  Goal: Absence of new skin breakdown  Description: 1. Monitor for areas of redness and/or skin breakdown  2. Assess vascular access sites hourly  3. Every 4-6 hours minimum:  Change oxygen saturation probe site  4. Every 4-6 hours:  If on nasal continuous positive airway pressure, respiratory therapy assess nares and determine need for appliance change or resting period. Outcome: Progressing     Problem: Safety - Adult  Goal: Free from fall injury  Outcome: Progressing     Problem: ABCDS Injury Assessment  Goal: Absence of physical injury  Outcome: Progressing     Problem: Physical Therapy - Adult  Goal: By Discharge: Performs mobility at highest level of function for planned discharge setting. See evaluation for individualized goals. Description: FUNCTIONAL STATUS PRIOR TO ADMISSION: Patient was independent and active without use of DME.    HOME SUPPORT PRIOR TO ADMISSION: The patient lived with wife but did not require assistance. Physical Therapy Goals  Initiated 11/22/2023  1. Patient will move from supine to sit and sit to supine in bed with modified independence within 7 day(s). 2.  Patient will perform sit to stand with supervision/set-up within 7 day(s). 3.  Patient will transfer from bed to chair and chair to bed with supervision/set-up using the least restrictive device within 7 day(s). 4.  Patient will ambulate with supervision/set-up for 150 feet with the least restrictive device within 7 day(s). 5.  Patient will ascend/descend 4 stairs with one handrail(s) with contact guard assist within 7 day(s).    11/22/2023 1526 by Mary Ann Magdaleno PT  Outcome: Progressing     Problem: Occupational Therapy - Adult  Goal: By

## 2023-11-23 NOTE — PROGRESS NOTES
Doing well, got around some yesterday  Vitals stable, off pressors. Awake and oriented  Left chest soft  Bilateral groins soft     Labs reveiwed     83 y/o WM with ruptured pararenal AAA, s/p EVAR with 3 vessel snorkel  - Overall continues to do well, pain resolved. Transfer to med/surg. Increase mobility/PT/OT. - ZULEIMA- resolved.   - Acute on Chronic blood loss anemia- Hgb stable  - Chronic thrombocytopenia- Stable, no signs of bleeding   - Advanced CAD- will need to clarify asa/plavix/coumadin, he is unsure exactly what medications he takes  - Chronic steroid use- continue home med  - Appreciate medicine input  - Home soon Patient has given consent to record this visit for documentation in their clinical record.

## 2023-11-24 LAB
ABO + RH BLD: NORMAL
ANION GAP SERPL CALC-SCNC: 2 MMOL/L (ref 5–15)
BLD PROD TYP BPU: NORMAL
BLOOD BANK DISPENSE STATUS: NORMAL
BLOOD GROUP ANTIBODIES SERPL: NORMAL
BPU ID: NORMAL
BUN SERPL-MCNC: 16 MG/DL (ref 6–20)
BUN/CREAT SERPL: 24 (ref 12–20)
CALCIUM SERPL-MCNC: 7.3 MG/DL (ref 8.5–10.1)
CHLORIDE SERPL-SCNC: 109 MMOL/L (ref 97–108)
CO2 SERPL-SCNC: 24 MMOL/L (ref 21–32)
CREAT SERPL-MCNC: 0.68 MG/DL (ref 0.7–1.3)
CROSSMATCH RESULT: NORMAL
ERYTHROCYTE [DISTWIDTH] IN BLOOD BY AUTOMATED COUNT: 15.8 % (ref 11.5–14.5)
GLUCOSE SERPL-MCNC: 83 MG/DL (ref 65–100)
HCT VFR BLD AUTO: 23.1 % (ref 36.6–50.3)
HCT VFR BLD AUTO: 24.3 % (ref 36.6–50.3)
HGB BLD-MCNC: 7.7 G/DL (ref 12.1–17)
HGB BLD-MCNC: 7.8 G/DL (ref 12.1–17)
INR PPP: 1.8 (ref 0.9–1.1)
MAGNESIUM SERPL-MCNC: 1.8 MG/DL (ref 1.6–2.4)
MCH RBC QN AUTO: 30.6 PG (ref 26–34)
MCHC RBC AUTO-ENTMCNC: 33.3 G/DL (ref 30–36.5)
MCV RBC AUTO: 91.7 FL (ref 80–99)
NRBC # BLD: 0 K/UL (ref 0–0.01)
NRBC BLD-RTO: 0 PER 100 WBC
PHOSPHATE SERPL-MCNC: 1.9 MG/DL (ref 2.6–4.7)
PLATELET # BLD AUTO: 52 K/UL (ref 150–400)
PMV BLD AUTO: 11.6 FL (ref 8.9–12.9)
POTASSIUM SERPL-SCNC: 3.6 MMOL/L (ref 3.5–5.1)
PROTHROMBIN TIME: 17.7 SEC (ref 9–11.1)
RBC # BLD AUTO: 2.52 M/UL (ref 4.1–5.7)
SODIUM SERPL-SCNC: 135 MMOL/L (ref 136–145)
SPECIMEN EXP DATE BLD: NORMAL
UNIT DIVISION: 0
WBC # BLD AUTO: 4.6 K/UL (ref 4.1–11.1)

## 2023-11-24 PROCEDURE — 1100000000 HC RM PRIVATE

## 2023-11-24 PROCEDURE — 2580000003 HC RX 258: Performed by: SURGERY

## 2023-11-24 PROCEDURE — 85014 HEMATOCRIT: CPT

## 2023-11-24 PROCEDURE — 6370000000 HC RX 637 (ALT 250 FOR IP): Performed by: SURGERY

## 2023-11-24 PROCEDURE — 80048 BASIC METABOLIC PNL TOTAL CA: CPT

## 2023-11-24 PROCEDURE — 2500000003 HC RX 250 WO HCPCS: Performed by: NURSE PRACTITIONER

## 2023-11-24 PROCEDURE — 6370000000 HC RX 637 (ALT 250 FOR IP): Performed by: INTERNAL MEDICINE

## 2023-11-24 PROCEDURE — 85027 COMPLETE CBC AUTOMATED: CPT

## 2023-11-24 PROCEDURE — 97535 SELF CARE MNGMENT TRAINING: CPT

## 2023-11-24 PROCEDURE — 97530 THERAPEUTIC ACTIVITIES: CPT

## 2023-11-24 PROCEDURE — 2580000003 HC RX 258: Performed by: NURSE PRACTITIONER

## 2023-11-24 PROCEDURE — 85018 HEMOGLOBIN: CPT

## 2023-11-24 PROCEDURE — 36415 COLL VENOUS BLD VENIPUNCTURE: CPT

## 2023-11-24 PROCEDURE — 84100 ASSAY OF PHOSPHORUS: CPT

## 2023-11-24 PROCEDURE — 85610 PROTHROMBIN TIME: CPT

## 2023-11-24 PROCEDURE — 83735 ASSAY OF MAGNESIUM: CPT

## 2023-11-24 RX ORDER — WARFARIN SODIUM 2 MG/1
2 TABLET ORAL
Status: COMPLETED | OUTPATIENT
Start: 2023-11-24 | End: 2023-11-24

## 2023-11-24 RX ADMIN — SODIUM CHLORIDE, PRESERVATIVE FREE 10 ML: 5 INJECTION INTRAVENOUS at 22:18

## 2023-11-24 RX ADMIN — LISINOPRIL 10 MG: 10 TABLET ORAL at 08:39

## 2023-11-24 RX ADMIN — GABAPENTIN 300 MG: 300 CAPSULE ORAL at 22:16

## 2023-11-24 RX ADMIN — HYDROXYCHLOROQUINE SULFATE 200 MG: 200 TABLET ORAL at 08:38

## 2023-11-24 RX ADMIN — GABAPENTIN 300 MG: 300 CAPSULE ORAL at 08:39

## 2023-11-24 RX ADMIN — METOPROLOL SUCCINATE 25 MG: 25 TABLET, EXTENDED RELEASE ORAL at 08:40

## 2023-11-24 RX ADMIN — SODIUM PHOSPHATE, MONOBASIC, MONOHYDRATE AND SODIUM PHOSPHATE, DIBASIC, ANHYDROUS 30 MMOL: 142; 276 INJECTION, SOLUTION INTRAVENOUS at 06:57

## 2023-11-24 RX ADMIN — FUROSEMIDE 20 MG: 20 TABLET ORAL at 08:40

## 2023-11-24 RX ADMIN — LATANOPROST 1 DROP: 50 SOLUTION/ DROPS OPHTHALMIC at 22:18

## 2023-11-24 RX ADMIN — ALLOPURINOL 300 MG: 100 TABLET ORAL at 08:40

## 2023-11-24 RX ADMIN — Medication 1 TABLET: at 18:03

## 2023-11-24 RX ADMIN — PANTOPRAZOLE SODIUM 40 MG: 40 TABLET, DELAYED RELEASE ORAL at 06:58

## 2023-11-24 RX ADMIN — WARFARIN SODIUM 2 MG: 2 TABLET ORAL at 18:02

## 2023-11-24 RX ADMIN — Medication 1 DROP: at 08:40

## 2023-11-24 RX ADMIN — FUROSEMIDE 20 MG: 20 TABLET ORAL at 22:18

## 2023-11-24 RX ADMIN — Medication 1 DROP: at 22:18

## 2023-11-24 RX ADMIN — PANTOPRAZOLE SODIUM 40 MG: 40 TABLET, DELAYED RELEASE ORAL at 18:03

## 2023-11-24 RX ADMIN — CLOPIDOGREL BISULFATE 75 MG: 75 TABLET ORAL at 08:38

## 2023-11-24 RX ADMIN — PREDNISONE 2 MG: 1 TABLET ORAL at 08:39

## 2023-11-24 RX ADMIN — SODIUM CHLORIDE, PRESERVATIVE FREE 10 ML: 5 INJECTION INTRAVENOUS at 08:40

## 2023-11-24 RX ADMIN — POTASSIUM CHLORIDE 10 MEQ: 750 TABLET, FILM COATED, EXTENDED RELEASE ORAL at 08:39

## 2023-11-24 RX ADMIN — ATORVASTATIN CALCIUM 40 MG: 40 TABLET, FILM COATED ORAL at 22:18

## 2023-11-24 RX ADMIN — HYDROXYCHLOROQUINE SULFATE 200 MG: 200 TABLET ORAL at 22:16

## 2023-11-24 ASSESSMENT — PAIN SCALES - GENERAL
PAINLEVEL_OUTOF10: 0
PAINLEVEL_OUTOF10: 0

## 2023-11-24 NOTE — PROGRESS NOTES
0730: Bedside shift change report given to Millie Monroy RN (oncoming nurse) by Belkis Mendoza RN (offgoing nurse). Report included the following information Nurse Handoff Report, Intake/Output, MAR, Recent Results, and Neuro Assessment. Report given to Elester Felty on 5E.

## 2023-11-24 NOTE — PROGRESS NOTES
Doing well, ambulating the ICU awaiting a bed  Vitals stable, off pressors. Awake and oriented  Left chest soft  Bilateral groins soft     Labs reveiwed     83 y/o WM with ruptured pararenal AAA, s/p EVAR with 3 vessel snorkel  - Overall continues to do well, pain resolved. Transfer to med/surg. Increase mobility/PT/OT. - ZULEIMA- resolved. - Acute on Chronic blood loss anemia- Hgb drifting some. Responded to unit no signs of clinical bleeding. No need to check q8, recheck tomorrow  - Chronic thrombocytopenia- Stable, no signs of bleeding   - Advanced CAD- appreciate medicine input, coumadin and plavix.  Will restart today   - Chronic steroid use- continue home med  - Appreciate medicine input  - Home when cbc stable

## 2023-11-24 NOTE — PROGRESS NOTES
[address] [facility]  Hospitalist Group                                                                                          Hospitalist Progress Note  Patricio Mao MD  Answering service: [phone] from in house phone        Date of Service:  2023  NAME:  Michele Hutchison :  1940  MRN:  [id number]       Admission Summary:   Michele Hutchison is a 82 y.o. male with a PMHx of CAD, hypertension, PAD, head and neck squamous cell cancer, and AAA status post repair (EVAR with bilateral renal artery snorkels) in , who to the emergency room on  with chief complaint of worsening abdominal pain. Patient had been seen in the emergency room the night before, he was noted to have an endograft leak, signed out [facility]. Patient was admitted to ICU. Vascular surgery was consulted. He was noted to have of the AAA increased in size with increased endoleak. Patient was urgently taken to the operating room for for endovascular aortic aneurysm repair, with extension of the aortic endograft and placement of bilateral renal and SMA snorkels. He remained in ICU overnight. He was on pressors. He was weaned off Brandon-Synephrine this morning. Blood pressure is low but stable, with systolic blood pressure in the low 100s. Patient is awake, alert, denies any complaints. He has some abdominal pain, but \"nothing like it was yesterday\". The Hospitalist team has been consulted for assistance in medical management. PROCEDURES:   :  ENDOVASCULAR AORTIC ANEURYSM REPAIR with extension of the aortic endograft and placement of bilateral renal and SMA snorkels       Interval history / Subjective:   Patient is doing well this morning, comfortable, BP well-controlled. Has some pain in the roof of his mouth from ETT trauma.       Assessment & Plan:           Ruptured AAA:  -ENDOVASCULAR AORTIC ANEURYSM REPAIR with extension of the aortic endograft and placement of

## 2023-11-24 NOTE — PROGRESS NOTES
0730: Bedside shift change report given to Isabelle Cornelius RN (oncoming nurse) by Fazal Love RN (offgoing nurse). Report included the following information Nurse Handoff Report, Intake/Output, MAR, Recent Results, and Neuro Assessment. Patient sat up in the chair for meals. Ambulated this afternoon in the olivo with the walker and standby assistance. Tolerated well.     1930: Bedside shift change report given to Fazal Love RN (oncoming nurse) by Isabelle Cornelius RN (offgoing nurse). Report included the following information Nurse Handoff Report, Intake/Output, MAR, Recent Results, and Neuro Assessment.

## 2023-11-24 NOTE — PROGRESS NOTES
Pharmacist Note - Warfarin Dosing  Consult provided for this 83 y.o.male to manage warfarin for advanced CAD    INR Goal: 2 - 3    Home regimen/ tablet size: 4 mg PO every evening    Drugs that may increase INR: None  Drugs that may decrease INR: None  Other current anticoagulants/ drugs that may increase bleeding risk: Clopidogrel  Risk factors: Age > 65  Daily INR ordered through: 12/4/23    Recent Labs     11/23/23 2026 11/24/23  0313 11/24/23  0539 11/24/23  1233   HGB 7.1* 7.7* 7.8*  --    INR  --   --   --  1.8*     Date               INR                  Dose  11/24  1.8  2 mg                                                                                Assessment/ Plan: Will order warfarin 2 mg PO x 1 dose. Pharmacy will continue to monitor daily and adjust therapy as indicated. Please contact the pharmacist at  for outpatient recommendations if needed.

## 2023-11-24 NOTE — PROGRESS NOTES
1930) .. Bedside and Verbal shift change report given to Chato Rhodes RN (oncoming nurse) by Sandy Smith RN (offgoing nurse). Report included the following information Nurse Handoff Report, Index, Intake/Output, MAR, Recent Results, Cardiac Rhythm NSR, and Neuro Assessment. 2250) H and H resulted and hbg 7.1. Notified Rawleigh Brittle, NP regarding lab results and held blood from earlier today. Orders to transfuse at this time    [id number] [phone] 83) blood bank notified and unit obtained. Transfusion initiated. 0420) AM labs resulted hbg 7.7. Notified Rawleigh Brittle, NP. Orders to redraw at 0600 and reassess. Vimal.Erice) H and H redrawn and resulted at 7.8, Phos also resulted at 1.9 notified Rawleigh Brittle, NP     8661) Spoke with Dr. Rogelio Dean, q8 H and H d/c will reassess in AM. Would like pt in non-tele medical floor bed. Passed on to charge RN. 7830) Bedside and Verbal shift change report given to Sandy Smith RN (oncoming nurse) by Chato Rhodes RN (offgoing nurse). Report included the following information Nurse Handoff Report, Intake/Output, MAR, Recent Results, and Cardiac Rhythm NSR .

## 2023-11-24 NOTE — PLAN OF CARE
Problem: Occupational Therapy - Adult  Goal: By Discharge: Performs self-care activities at highest level of function for planned discharge setting. See evaluation for individualized goals. Description: FUNCTIONAL STATUS PRIOR TO ADMISSION:  Patient was ambulatory without use of DME.     HOME SUPPORT: Patient lived with his wife but didn't require assistance. Occupational Therapy Goals:  Initiated 11/22/2023  1. Patient will perform standing grooming routine with Todd within 7 day(s). 2.  Patient will perform upper and lower body bathing with Todd within 7 day(s). 3.  Patient will perform upper and lower body dressing with Todd within 7 day(s). 4.  Patient will perform toilet transfers with Todd  within 7 day(s). 5.  Patient will perform all aspects of toileting with Todd within 7 day(s). 6.  Patient will participate in upper extremity therapeutic exercise/activities with Supervision for 5 minutes within 7 day(s). 7.  Patient will utilize energy conservation techniques during functional activities with verbal cues within 7 day(s). Outcome: Progressing   OCCUPATIONAL THERAPY TREATMENT  Patient: Kely Freeman. (82 y.o. male)  Date: 11/24/2023  Primary Diagnosis: Leaking abdominal aortic aneurysm (AAA) ([facility]) [I71.30]  Ruptured abdominal aortic aneurysm (AAA) ([address]) [I71.30]  Procedure(s) (LRB):  ENDOVASCULAR AORTIC ANEURYSM REPAIR (Bilateral) 4 Days Post-Op   Precautions:  (Goal SBP>100)                Chart, occupational therapy assessment, plan of care, and goals were reviewed. ASSESSMENT  Patient continues to benefit from skilled OT services and is progressing towards goals. Pt's performance of ADL/IADL tasks continues to be limited at this time by impaired standing balance, activity tolerance, and generalized weakness. He demonstrated improved activity tolerance with completion of full ADL routine with CGA.  Initiated OOB mobility and ADLs without use of

## 2023-11-24 NOTE — PLAN OF CARE
Problem: Physical Therapy - Adult  Goal: By Discharge: Performs mobility at highest level of function for planned discharge setting. See evaluation for individualized goals. Description: FUNCTIONAL STATUS PRIOR TO ADMISSION: Patient was independent and active without use of DME.    HOME SUPPORT PRIOR TO ADMISSION: The patient lived with wife but did not require assistance. Physical Therapy Goals  Initiated 11/22/2023  1. Patient will move from supine to sit and sit to supine in bed with modified independence within 7 day(s). 2.  Patient will perform sit to stand with supervision/set-up within 7 day(s). 3.  Patient will transfer from bed to chair and chair to bed with supervision/set-up using the least restrictive device within 7 day(s). 4.  Patient will ambulate with supervision/set-up for 150 feet with the least restrictive device within 7 day(s). 5.  Patient will ascend/descend 4 stairs with one handrail(s) with contact guard assist within 7 day(s). Outcome: Progressing       PHYSICAL THERAPY TREATMENT    Patient: Gilford Fletcher. (82 y.o. male)  Date: 11/24/2023  Diagnosis: Leaking abdominal aortic aneurysm (AAA) ([address]) [I71.30]  Ruptured abdominal aortic aneurysm (AAA) ([facility]) [I71.30] Ruptured abdominal aortic aneurysm (AAA) ([address])  Procedure(s) (LRB):  ENDOVASCULAR AORTIC ANEURYSM REPAIR (Bilateral) 4 Days Post-Op  Precautions:  (Goal SBP>100)                    ASSESSMENT:  Patient continues to benefit from skilled PT services and is progressing towards goals. Patient demonstrated improved tolerance for ambulation, improved sitting and standing tolerance and balance. Recommend ambulating with rolling walker for safety- discussed with wife who stated they could have a rolling walker available at home. PLAN:  Patient continues to benefit from skilled intervention to address the above impairments. Continue treatment per established plan of care.     Recommendation for discharge: (in order

## 2023-11-25 VITALS
TEMPERATURE: 98.1 F | SYSTOLIC BLOOD PRESSURE: 134 MMHG | DIASTOLIC BLOOD PRESSURE: 72 MMHG | OXYGEN SATURATION: 97 % | RESPIRATION RATE: 18 BRPM | HEIGHT: 67 IN | HEART RATE: 85 BPM | BODY MASS INDEX: 26.85 KG/M2 | WEIGHT: 171.08 LBS

## 2023-11-25 LAB
ANION GAP SERPL CALC-SCNC: 4 MMOL/L (ref 5–15)
BASOPHILS # BLD: 0 K/UL (ref 0–0.1)
BASOPHILS NFR BLD: 1 % (ref 0–1)
BUN SERPL-MCNC: 13 MG/DL (ref 6–20)
BUN/CREAT SERPL: 18 (ref 12–20)
CALCIUM SERPL-MCNC: 7.5 MG/DL (ref 8.5–10.1)
CHLORIDE SERPL-SCNC: 107 MMOL/L (ref 97–108)
CO2 SERPL-SCNC: 24 MMOL/L (ref 21–32)
CREAT SERPL-MCNC: 0.71 MG/DL (ref 0.7–1.3)
DIFFERENTIAL METHOD BLD: ABNORMAL
EOSINOPHIL # BLD: 0 K/UL (ref 0–0.4)
EOSINOPHIL NFR BLD: 1 % (ref 0–7)
ERYTHROCYTE [DISTWIDTH] IN BLOOD BY AUTOMATED COUNT: 15.9 % (ref 11.5–14.5)
GLUCOSE SERPL-MCNC: 77 MG/DL (ref 65–100)
HCT VFR BLD AUTO: 23.9 % (ref 36.6–50.3)
HGB BLD-MCNC: 7.8 G/DL (ref 12.1–17)
IMM GRANULOCYTES # BLD AUTO: 0 K/UL
IMM GRANULOCYTES NFR BLD AUTO: 0 %
INR PPP: 1.7 (ref 0.9–1.1)
LYMPHOCYTES # BLD: 0.2 K/UL (ref 0.8–3.5)
LYMPHOCYTES NFR BLD: 5 % (ref 12–49)
MCH RBC QN AUTO: 29.5 PG (ref 26–34)
MCHC RBC AUTO-ENTMCNC: 32.6 G/DL (ref 30–36.5)
MCV RBC AUTO: 90.5 FL (ref 80–99)
METAMYELOCYTES NFR BLD MANUAL: 2 %
MONOCYTES # BLD: 0.6 K/UL (ref 0–1)
MONOCYTES NFR BLD: 15 % (ref 5–13)
MYELOCYTES NFR BLD MANUAL: 1 %
NEUTS BAND NFR BLD MANUAL: 2 % (ref 0–6)
NEUTS SEG # BLD: 3.2 K/UL (ref 1.8–8)
NEUTS SEG NFR BLD: 73 % (ref 32–75)
NRBC # BLD: 0 K/UL (ref 0–0.01)
NRBC BLD-RTO: 0 PER 100 WBC
PLATELET # BLD AUTO: 58 K/UL (ref 150–400)
PMV BLD AUTO: 11.4 FL (ref 8.9–12.9)
POTASSIUM SERPL-SCNC: 3.6 MMOL/L (ref 3.5–5.1)
PROTHROMBIN TIME: 16.9 SEC (ref 9–11.1)
RBC # BLD AUTO: 2.64 M/UL (ref 4.1–5.7)
RBC MORPH BLD: ABNORMAL
RBC MORPH BLD: ABNORMAL
SODIUM SERPL-SCNC: 135 MMOL/L (ref 136–145)
WBC # BLD AUTO: 4.3 K/UL (ref 4.1–11.1)

## 2023-11-25 PROCEDURE — 6370000000 HC RX 637 (ALT 250 FOR IP): Performed by: INTERNAL MEDICINE

## 2023-11-25 PROCEDURE — 85025 COMPLETE CBC W/AUTO DIFF WBC: CPT

## 2023-11-25 PROCEDURE — 85610 PROTHROMBIN TIME: CPT

## 2023-11-25 PROCEDURE — 80048 BASIC METABOLIC PNL TOTAL CA: CPT

## 2023-11-25 PROCEDURE — 36415 COLL VENOUS BLD VENIPUNCTURE: CPT

## 2023-11-25 PROCEDURE — 6370000000 HC RX 637 (ALT 250 FOR IP): Performed by: SURGERY

## 2023-11-25 RX ADMIN — POTASSIUM CHLORIDE 10 MEQ: 750 TABLET, FILM COATED, EXTENDED RELEASE ORAL at 10:11

## 2023-11-25 RX ADMIN — PANTOPRAZOLE SODIUM 40 MG: 40 TABLET, DELAYED RELEASE ORAL at 06:23

## 2023-11-25 RX ADMIN — CLOPIDOGREL BISULFATE 75 MG: 75 TABLET ORAL at 10:11

## 2023-11-25 RX ADMIN — HYDROXYCHLOROQUINE SULFATE 200 MG: 200 TABLET ORAL at 10:12

## 2023-11-25 RX ADMIN — ALLOPURINOL 300 MG: 100 TABLET ORAL at 10:11

## 2023-11-25 RX ADMIN — GABAPENTIN 300 MG: 300 CAPSULE ORAL at 10:11

## 2023-11-25 RX ADMIN — METOPROLOL SUCCINATE 25 MG: 25 TABLET, EXTENDED RELEASE ORAL at 10:11

## 2023-11-25 RX ADMIN — Medication 1 DROP: at 10:12

## 2023-11-25 RX ADMIN — PREDNISONE 2 MG: 1 TABLET ORAL at 10:11

## 2023-11-25 RX ADMIN — LISINOPRIL 10 MG: 10 TABLET ORAL at 10:12

## 2023-11-25 NOTE — PROGRESS NOTES
Patient discharged to home with family care. IV removed. Patient provided education on medications and next doses, follow up for staple removal, and how to set up mychart. Patient and wife acknowledged understanding and able to demonstrate competence with blood thinner education via teachback. Assisted to main entrance via wheelchair. At time of discharge, assessment unchanged from prior and patient in no apparent distress. All belongings sent with patient. Nasalis-Muscle-Based Myocutaneous Island Pedicle Flap Text: Using a #15 blade, an incision was made around the donor flap to the level of the nasalis muscle. Wide lateral undermining was then performed in both the subcutaneous plane above the nasalis muscle, and in a submuscular plane just above periosteum. This allowed the formation of a free nasalis muscle axial pedicle (based on the angular artery) which was still attached to the actual cutaneous flap, increasing its mobility and vascular viability. Hemostasis was obtained with pinpoint electrocoagulation. The flap was mobilized into position and the pivotal anchor points positioned and stabilized with buried interrupted sutures. Subcutaneous and dermal tissues were closed in a multilayered fashion with sutures. Tissue redundancies were excised, and the epidermal edges were apposed without significant tension and sutured with sutures.

## 2023-11-25 NOTE — DISCHARGE INSTRUCTIONS
Activity as tolerated    Remove dressings and shower anytime    Follow up with Dr Clyde Valdes 2 weeks post op for staple removal

## 2023-11-25 NOTE — PROGRESS NOTES
Assumed care at [address] [place] [address]. Pt has been resting comfortably in bed overnight. Pt has no complaints of pain or discomfort at this time. See mar. Pt states \"hopefully I can get out of here today\". No significant events overnight.

## 2023-11-25 NOTE — PLAN OF CARE
Problem: Pain  Goal: Verbalizes/displays adequate comfort level or baseline comfort level  Outcome: Adequate for Discharge     Problem: Discharge Planning  Goal: Discharge to home or other facility with appropriate resources  Outcome: Completed     Problem: Skin/Tissue Integrity  Goal: Absence of new skin breakdown  Description: 1. Monitor for areas of redness and/or skin breakdown  2. Assess vascular access sites hourly  3. Every 4-6 hours minimum:  Change oxygen saturation probe site  4. Every 4-6 hours:  If on nasal continuous positive airway pressure, respiratory therapy assess nares and determine need for appliance change or resting period.   Outcome: Completed     Problem: Safety - Adult  Goal: Free from fall injury  Outcome: Completed  Flowsheets (Taken 11/25/2023 0900)  Free From Fall Injury: Instruct family/caregiver on patient safety     Problem: ABCDS Injury Assessment  Goal: Absence of physical injury  Outcome: Completed  Flowsheets (Taken 11/25/2023 0900)  Absence of Physical Injury: Implement safety measures based on patient assessment

## 2023-11-25 NOTE — PLAN OF CARE
Problem: Pain  Goal: Verbalizes/displays adequate comfort level or baseline comfort level  11/25/2023 1040 by Keo Guzman RN  Outcome: Completed  11/25/2023 1023 by Keo Guzman RN  Outcome: Adequate for Discharge     Problem: Discharge Planning  Goal: Discharge to home or other facility with appropriate resources  Outcome: Completed     Problem: Skin/Tissue Integrity  Goal: Absence of new skin breakdown  Description: 1. Monitor for areas of redness and/or skin breakdown  2. Assess vascular access sites hourly  3. Every 4-6 hours minimum:  Change oxygen saturation probe site  4. Every 4-6 hours:  If on nasal continuous positive airway pressure, respiratory therapy assess nares and determine need for appliance change or resting period.   Outcome: Completed     Problem: Safety - Adult  Goal: Free from fall injury  Outcome: Completed  Flowsheets (Taken 11/25/2023 0900)  Free From Fall Injury: Instruct family/caregiver on patient safety     Problem: ABCDS Injury Assessment  Goal: Absence of physical injury  Outcome: Completed  Flowsheets (Taken 11/25/2023 0900)  Absence of Physical Injury: Implement safety measures based on patient assessment

## 2023-12-22 ENCOUNTER — HOSPITAL ENCOUNTER (EMERGENCY)
Facility: HOSPITAL | Age: 83
Discharge: HOME | End: 2023-12-22
Attending: EMERGENCY MEDICINE
Payer: MEDICARE

## 2023-12-22 ENCOUNTER — APPOINTMENT (OUTPATIENT)
Facility: HOSPITAL | Age: 83
End: 2023-12-22
Payer: MEDICARE

## 2023-12-22 VITALS
HEIGHT: 67 IN | WEIGHT: 146.83 LBS | DIASTOLIC BLOOD PRESSURE: 98 MMHG | TEMPERATURE: 97 F | BODY MASS INDEX: 23.04 KG/M2 | HEART RATE: 85 BPM | OXYGEN SATURATION: 97 % | SYSTOLIC BLOOD PRESSURE: 152 MMHG | RESPIRATION RATE: 16 BRPM

## 2023-12-22 DIAGNOSIS — K13.70 MOUTH LESION: Primary | ICD-10-CM

## 2023-12-22 LAB
ALBUMIN SERPL-MCNC: 3.2 G/DL (ref 3.5–5)
ALBUMIN/GLOB SERPL: 0.9 (ref 1.1–2.2)
ALP SERPL-CCNC: 91 U/L (ref 45–117)
ALT SERPL-CCNC: 29 U/L (ref 12–78)
ANION GAP SERPL CALC-SCNC: 4 MMOL/L (ref 5–15)
APTT PPP: 55 SEC (ref 22.1–31)
AST SERPL-CCNC: 35 U/L (ref 15–37)
BASOPHILS # BLD: 0 K/UL (ref 0–0.1)
BASOPHILS NFR BLD: 0 % (ref 0–1)
BILIRUB SERPL-MCNC: 0.7 MG/DL (ref 0.2–1)
BUN SERPL-MCNC: 16 MG/DL (ref 6–20)
BUN/CREAT SERPL: 18 (ref 12–20)
CALCIUM SERPL-MCNC: 9.1 MG/DL (ref 8.5–10.1)
CHLORIDE SERPL-SCNC: 105 MMOL/L (ref 97–108)
CO2 SERPL-SCNC: 28 MMOL/L (ref 21–32)
COMMENT:: NORMAL
CREAT SERPL-MCNC: 0.88 MG/DL (ref 0.7–1.3)
DIFFERENTIAL METHOD BLD: ABNORMAL
EOSINOPHIL # BLD: 0 K/UL (ref 0–0.4)
EOSINOPHIL NFR BLD: 0 % (ref 0–7)
ERYTHROCYTE [DISTWIDTH] IN BLOOD BY AUTOMATED COUNT: 17.2 % (ref 11.5–14.5)
GLOBULIN SER CALC-MCNC: 3.4 G/DL (ref 2–4)
GLUCOSE SERPL-MCNC: 89 MG/DL (ref 65–100)
HCT VFR BLD AUTO: 27.4 % (ref 36.6–50.3)
HGB BLD-MCNC: 8.9 G/DL (ref 12.1–17)
IMM GRANULOCYTES # BLD AUTO: 0 K/UL
IMM GRANULOCYTES NFR BLD AUTO: 0 %
INR PPP: 2.4 (ref 0.9–1.1)
LYMPHOCYTES # BLD: 0.3 K/UL (ref 0.8–3.5)
LYMPHOCYTES NFR BLD: 5 % (ref 12–49)
MCH RBC QN AUTO: 30.8 PG (ref 26–34)
MCHC RBC AUTO-ENTMCNC: 32.5 G/DL (ref 30–36.5)
MCV RBC AUTO: 94.8 FL (ref 80–99)
MONOCYTES # BLD: 0.4 K/UL (ref 0–1)
MONOCYTES NFR BLD: 7 % (ref 5–13)
NEUTS BAND NFR BLD MANUAL: 1 % (ref 0–6)
NEUTS SEG # BLD: 5.2 K/UL (ref 1.8–8)
NEUTS SEG NFR BLD: 87 % (ref 32–75)
NRBC # BLD: 0 K/UL (ref 0–0.01)
NRBC BLD-RTO: 0 PER 100 WBC
PLATELET # BLD AUTO: 49 K/UL (ref 150–400)
PMV BLD AUTO: 11.8 FL (ref 8.9–12.9)
POTASSIUM SERPL-SCNC: 4.3 MMOL/L (ref 3.5–5.1)
PROT SERPL-MCNC: 6.6 G/DL (ref 6.4–8.2)
PROTHROMBIN TIME: 23.6 SEC (ref 9–11.1)
RBC # BLD AUTO: 2.89 M/UL (ref 4.1–5.7)
RBC MORPH BLD: ABNORMAL
SODIUM SERPL-SCNC: 137 MMOL/L (ref 136–145)
SPECIMEN HOLD: NORMAL
THERAPEUTIC RANGE: ABNORMAL SECS (ref 58–77)
WBC # BLD AUTO: 5.9 K/UL (ref 4.1–11.1)

## 2023-12-22 PROCEDURE — 6360000004 HC RX CONTRAST MEDICATION: Performed by: RADIOLOGY

## 2023-12-22 PROCEDURE — 36415 COLL VENOUS BLD VENIPUNCTURE: CPT

## 2023-12-22 PROCEDURE — 85730 THROMBOPLASTIN TIME PARTIAL: CPT

## 2023-12-22 PROCEDURE — 70487 CT MAXILLOFACIAL W/DYE: CPT

## 2023-12-22 PROCEDURE — 99285 EMERGENCY DEPT VISIT HI MDM: CPT

## 2023-12-22 PROCEDURE — 85025 COMPLETE CBC W/AUTO DIFF WBC: CPT

## 2023-12-22 PROCEDURE — 80053 COMPREHEN METABOLIC PANEL: CPT

## 2023-12-22 PROCEDURE — 85610 PROTHROMBIN TIME: CPT

## 2023-12-22 RX ADMIN — IOPAMIDOL 100 ML: 612 INJECTION, SOLUTION INTRAVENOUS at 13:50

## 2023-12-22 NOTE — ED PROVIDER NOTES
[facility] EMERGENCY DEP  EMERGENCY DEPARTMENT ENCOUNTER      Pt Name: Bryn Vizcarra. MRN: [id number]  Birthdate 1940  Date of evaluation: 12/22/2023  Provider: Mary Aldana       Chief Complaint   Patient presents with    Mouth Lesions         HISTORY OF PRESENT ILLNESS   (Location/Symptom, Timing/Onset, Context/Setting, Quality, Duration, Modifying Factors, Severity)  Note limiting factors. 82-year-old male presents emergency department chief complaint of lesions in his mouth. He had noted them several days ago and states that they are initially painful and then just became slightly uncomfortable. Denies a prior history of this          Review of External Medical Records:     Nursing Notes were reviewed. REVIEW OF SYSTEMS    (2-9 systems for level 4, 10 or more for level 5)     Review of Systems    Except as noted above the remainder of the review of systems was reviewed and negative.        PAST MEDICAL HISTORY     Past Medical History:   Diagnosis Date    Arthritis     CAD (coronary artery disease) 1998    5 stents    Cancer ([address]) 2020    squamous cell, basal cell, melanoma    Coagulation disorder (HCC)     blood clotting d/o from taking naproxen    Hypertension     Lupus (HCC)     PAD (peripheral artery disease) ([address])     stents: aorta & lower extremities    Thromboembolus [facility])          SURGICAL HISTORY       Past Surgical History:   Procedure Laterality Date    CARDIAC SURGERY Bilateral 11/20/2023    ENDOVASCULAR AORTIC ANEURYSM REPAIR performed by Jameson Wheatley MD at [address] [facility] [facility] Bilateral 2010    CHOLECYSTECTOMY      OTHER SURGICAL HISTORY      multiple spots of skin cancer removal    [facility]    stents placed    US BIOPSY OF SALIVARY GLAND  03/04/2021    US BIOPSY OF SALIVARY GLAND 3/4/2021 [facility] RAD US    VASCULAR SURGERY  2017    IVC filter         CURRENT MEDICATIONS       Discharge Medications    ACETAMINOPHEN (TYLENOL) 500 MG TABLET    Take by mouth every 6 hours as needed    ALLOPURINOL (ZYLOPRIM) 300 MG TABLET    Take 1 tablet by mouth daily    ATORVASTATIN (LIPITOR) 40 MG TABLET    Take 1 tablet by mouth nightly    B COMPLEX VITAMINS CAPSULE    Take 1 capsule by mouth daily    CALCIUM CARBONATE-VITAMIN D 600-5 MG-MCG TABS    Take 1 tablet by mouth every evening    CLOPIDOGREL (PLAVIX) 75 MG TABLET    Take 1 tablet by mouth daily    DOCUSATE SODIUM (COLACE) 100 MG CAPSULE    Take 2 capsules by mouth 2 times daily    FUROSEMIDE (LASIX) 20 MG TABLET    Take 1 tablet by mouth 2 times daily    GABAPENTIN (NEURONTIN) 300 MG CAPSULE    Take 1 capsule by mouth 2 times daily.     HYDROXYCHLOROQUINE (PLAQUENIL) 200 MG TABLET    Take 1 tablet by mouth 2 times daily    LATANOPROST (XALATAN) 0.005 % OPHTHALMIC SOLUTION    Place 1 drop into both eyes daily    LISINOPRIL (PRINIVIL;ZESTRIL) 10 MG TABLET    Take 1 tablet by mouth daily    METOPROLOL SUCCINATE (TOPROL XL) 25 MG EXTENDED RELEASE TABLET    Take 1 tablet by mouth daily    OMEPRAZOLE (PRILOSEC) 40 MG DELAYED RELEASE CAPSULE    Take by mouth daily    POLYVINYL ALCOHOL (LIQUIFILM TEARS) 1.4 % OPHTHALMIC SOLUTION    Apply 1 drop to eye 2 times daily    POTASSIUM CHLORIDE (KLOR-CON) 10 MEQ EXTENDED RELEASE TABLET    Take 1 tablet by mouth daily    PREDNISONE (DELTASONE) 1 MG TABLET    Take 2 tablets by mouth daily    VITAMIN D (CHOLECALCIFEROL) 25 MCG (1000 UT) TABS TABLET    Take 1 tablet by mouth daily    WARFARIN (COUMADIN) 2 MG TABLET    Take 2 tablets by mouth Daily with supper       ALLERGIES     Alendronate sodium, Bacitracin, Montelukast, Naproxen, and Lovenox [enoxaparin]    FAMILY HISTORY       Family History   Problem Relation Age of Onset    No Known Problems Brother     Heart Disease Brother     No Known Problems Mother     Anesth Problems Neg Hx     No Known Problems Brother     Cancer Sister     Stroke Father     Cancer Brother

## 2023-12-22 NOTE — ED NOTES
12:13 PM  I have evaluated the patient as the Provider in Rapid Medical Evaluation (RME). I have reviewed his vital signs and the triage nurse assessment. I have talked with the patient and any available family and advised that I am the provider in triage and have ordered the appropriate study to initiate their work up based on the clinical presentation during my assessment. I have advised that the patient will be accommodated in the Main ED as soon as possible. I have also requested to contact the triage nurse or myself immediately if the patient experiences any changes in their condition during this brief waiting period. 81-year-old male with a past medical history of AAA, hypertension, hyperlipidemia, CAD, squamous cell carcinoma of the face presents with complaint of increased bruising and \"blood boils\" in the mouth. Wife states that they have been coming and going over the past 2 weeks, however this one has been increasing in size and not resolving. Woke up this morning with blood all over the pillow. Also stating he is having more frequent nosebleeds. INR was checked about a week ago by primary care secondary to chronic Coumadin use, and wife states it was 3.2. PCP recommended he stop it for a day, however he has resumed it now.     [facility] LATA Tom       [facility]  12/22/23 4201

## 2023-12-22 NOTE — ED TRIAGE NOTES
Pt had recent surgery for leaky AAA repair in November, was started back on warfarin at discharge. Noted increased bruising and \"blood boils\" in mouth. Woke up this am with blood on pillow.  INR was 3.2 recently

## (undated) DEVICE — SPONGE GZ W4XL4IN COT RADPQ HIGHLY ABSRB

## (undated) DEVICE — CATHETER ANGIO 5FR L65CM 0.038IN BERN SEL SFT RADPQ TIP HI

## (undated) DEVICE — SYRINGE MED BRL 10 CC CNTRST FIX M LUER CONN GRN MEDALLION

## (undated) DEVICE — ELECTRODE ES 36CM LAP FLAT L HK COAT DISP CLEANCOAT

## (undated) DEVICE — COVER LT HNDL PLAS RIG 1 PER PK

## (undated) DEVICE — AGENT HEMSTAT W4XL4IN OXIDIZED REGENERATED CELOS ABSRB SFT

## (undated) DEVICE — CATHETER ANGIO 5FR L100CM GWIRE 0.035IN 1CM SPC PGTL FLSH

## (undated) DEVICE — SUTURE CHROMIC GUT SZ 4-0 L27IN ABSRB BRN FS-2 L19MM 3/8 635H

## (undated) DEVICE — MASTISOL ADHESIVE LIQ 2/3ML

## (undated) DEVICE — GARMENT,MEDLINE,DVT,INT,CALF,LG, GEN2: Brand: MEDLINE

## (undated) DEVICE — ELECTRO LUBE IS A SINGLE PATIENT USE DEVICE THAT IS INTENDED TO BE USED ON ELECTROSURGICAL ELECTRODES TO REDUCE STICKING.: Brand: KEY SURGICAL ELECTRO LUBE

## (undated) DEVICE — TOWEL SURG W17XL27IN STD BLU COT NONFENESTRATED PREWASHED

## (undated) DEVICE — GUIDEWIRE: Brand: AMPLATZ SUPER STIFF™

## (undated) DEVICE — SUTURE PDS II SZ 0 L27IN ABSRB VLT L26MM CT-2 1/2 CIR Z334H

## (undated) DEVICE — GUIDEWIRE VASC L260CM DIA0.035IN TIP L7CM PTFE S STL STR

## (undated) DEVICE — GLOVE SURG SZ 7 L12IN FNGR THK79MIL GRN LTX FREE

## (undated) DEVICE — DRAPE,FEM ANGIO,2 WIN,STERILE: Brand: MEDLINE

## (undated) DEVICE — GENERAL LAPAROSCOPY - SMH: Brand: MEDLINE INDUSTRIES, INC.

## (undated) DEVICE — SCISSORS ENDOSCP DIA5MM CRV MPLR CAUT W/ RATCH HNDL

## (undated) DEVICE — SUTURE MCRYL SZ 5-0 L18IN ABSRB UD L13MM P-3 3/8 CIR PRIM Y493G

## (undated) DEVICE — ROSEN CURVED WIRE GUIDE: Brand: ROSEN

## (undated) DEVICE — SURGICAL PROCEDURE PACK BASIN MAJ SET CUST NO CAUT

## (undated) DEVICE — SHEATH INTRO 20FR L33CM OD7.5MM ID6.7MM HYDRPHLC KINK

## (undated) DEVICE — PACK CUSTOM HEART CATH PACK

## (undated) DEVICE — CATHETER PTCA 8FR L100CM BLLN DIA10-46MM SHTH 12FR GWIRE

## (undated) DEVICE — ANGIO-SEAL VIP VASCULAR CLOSURE DEVICE: Brand: ANGIO-SEAL

## (undated) DEVICE — SET CHOLANGIOGRAPHY 4FR L60CM W/ ARW KARLAN BLLN CATH CRV

## (undated) DEVICE — SOLUTION IRRIG 1000ML H2O STRL BLT

## (undated) DEVICE — SPONGE: SPECIALTY PEANUT XR 100/CS: Brand: MEDICAL ACTION INDUSTRIES

## (undated) DEVICE — Device: Brand: EAGLE EYE PLATINUM RX DIGITAL IVUS CATHETER

## (undated) DEVICE — GARMENT,MEDLINE,DVT,INT,CALF,MED, GEN2: Brand: MEDLINE

## (undated) DEVICE — PACK,EENT,TURBAN DRAPE,PK II: Brand: MEDLINE

## (undated) DEVICE — BAG SPEC REM 224ML W4XL6IN DIA10MM 1 HND GYN DISP ENDOPCH

## (undated) DEVICE — Device

## (undated) DEVICE — STRAP,POSITIONING,KNEE/BODY,FOAM,4X60": Brand: MEDLINE

## (undated) DEVICE — TROCAR ENDOSCP L100MM DIA5MM BLDELSS STBL SL THRD OPT VW

## (undated) DEVICE — INSULATED BLADE ELECTRODE: Brand: EDGE

## (undated) DEVICE — 4-PORT MANIFOLD: Brand: NEPTUNE 2

## (undated) DEVICE — GUIDEWIRE VASC L180CM DIA0.035IN TIP L7CM PTFE S STL STR

## (undated) DEVICE — SUTURE VCRL SZ 3-0 L18IN ABSRB UD PS-2 L19MM 1/2 CIR J497G

## (undated) DEVICE — CATHETER GUID 6FR L100CM ID0.071IN COR MP 1 NYL L LUMN MID

## (undated) DEVICE — DERMABOND SKIN ADH 0.7ML -- DERMABOND ADVANCED 12/BX

## (undated) DEVICE — GUIDEWIRE VASC L180CM DIA0.035IN L10CM DIA3MM J TIP PTFE S

## (undated) DEVICE — DEVICE INFL 20ML L13IN 30ATM CLR POLYCARB TB PRTBL DGT

## (undated) DEVICE — ROCKER SWITCH PENCIL BLADE ELECTRODE, HOLSTER: Brand: EDGE

## (undated) DEVICE — Z INACTIVE USE 2735373 APPLICATOR FBR LAIN COT WOOD TIP ECONOMICAL

## (undated) DEVICE — C-ARM: Brand: UNBRANDED

## (undated) DEVICE — BIPOLAR FORCEPS CORD: Brand: VALLEYLAB

## (undated) DEVICE — SUT SLK 3-0 30IN SH BLK --

## (undated) DEVICE — SYR 20ML LL STRL LF --

## (undated) DEVICE — DESTINATION PERIPHERAL GUIDING SHEATH: Brand: DESTINATION

## (undated) DEVICE — INFLATION DEVICE: Brand: ENCORE 26

## (undated) DEVICE — INFECTION CONTROL KIT SYS

## (undated) DEVICE — CHECK-FLO HEMOSTASIS ASSEMBLY: Brand: CHECK-FLO

## (undated) DEVICE — SPHINCTEROTOME: Brand: DREAMTOME™ RX 44

## (undated) DEVICE — SUTURE MCRYL SZ 4-0 L27IN ABSRB UD L19MM PS-2 1/2 CIR PRIM Y426H

## (undated) DEVICE — REM POLYHESIVE ADULT PATIENT RETURN ELECTRODE: Brand: VALLEYLAB

## (undated) DEVICE — STRIP,CLOSURE,WOUND,MEDI-STRIP,1/2X4: Brand: MEDLINE

## (undated) DEVICE — DEVICE LCK BILI RAP EXCHG OLPS --

## (undated) DEVICE — SOLUTION IV 1000ML 0.9% SOD CHL

## (undated) DEVICE — TROCAR ENDOSCP L100MM DIA12MM BLDELSS OBT RADLUC STBL SL

## (undated) DEVICE — GLOVE SURG SZ 65 L12IN FNGR THK94MIL STD WHT LTX FREE

## (undated) DEVICE — NEEDLE HYPO 25GA L1.5IN BLU POLYPR HUB S STL REG BVL STR

## (undated) DEVICE — SUTURE PLN GUT SZ 4-0 P-3 L18IN ABSRB YELLOWISH TAN L13MM 1644G

## (undated) DEVICE — CONTAINER,SPECIMEN,4OZ,OR STRL: Brand: MEDLINE

## (undated) DEVICE — RADIFOCUS GLIDEWIRE: Brand: GLIDEWIRE

## (undated) DEVICE — TROCAR ENDOSCP L100MM DIA5MM BLDELSS STBL SL OBT RADLUC

## (undated) DEVICE — DEVICE ES L3M EDGE COAT HEX LOK BLDE ELECTRD HOLSTER FORC

## (undated) DEVICE — TUOHY-BORST SIDE-ARM ADAPTER: Brand: COOK

## (undated) DEVICE — SUTURE PERMAHAND SZ 2-0 L18IN NONABSORBABLE BLK L26MM PS 1588H

## (undated) DEVICE — SYSTEM EVAC SMOKE LAPARSCOPIC

## (undated) DEVICE — APPLIER CLP M/L SHFT DIA5MM 15 LIG LIGAMAX 5

## (undated) DEVICE — 40418 TRENDELENBURG ONE-STEP ARM PROTECTORS LARGE (1 PAIR): Brand: 40418 TRENDELENBURG ONE-STEP ARM PROTECTORS LARGE (1 PAIR)

## (undated) DEVICE — RETRIEVAL BALLOON CATHETER: Brand: EXTRACTOR™ PRO RX

## (undated) DEVICE — TUBING HI PRSS INJ 48IN 1200PSI FLX BRAID POLYUR CNTRST

## (undated) DEVICE — ANGIOGRAPHIC CATHETER: Brand: IMPULSE™

## (undated) DEVICE — DESTINATION CAROTID GUIDING SHEATH: Brand: DESTINATION

## (undated) DEVICE — SUTURE PERMAHAND SZ 3-0 L30IN NONABSORBABLE BLK SILK BRAID A304H

## (undated) DEVICE — ENDOLOOP SUT LIGATURE 18IN -- 12/BX PDS II

## (undated) DEVICE — CATHETER ANGIO 5FR L100CM GWIRE 0.038IN BERN NONBRAIDED HI

## (undated) DEVICE — STIMULATOR 8562010 VARI-STIM 10PK ROHS: Brand: VARI-STIM®

## (undated) DEVICE — SYR 10ML CTRL LR LCK NSAF LF --

## (undated) DEVICE — BALLOON DILATATION CATHETER: Brand: HURRICANE™ RX

## (undated) DEVICE — INTENDED FOR TISSUE SEPARATION, AND OTHER PROCEDURES THAT REQUIRE A SHARP SURGICAL BLADE TO PUNCTURE OR CUT.: Brand: BARD-PARKER ® CARBON RIB-BACK BLADES

## (undated) DEVICE — TRAY PREP DRY W/ PREM GLV 2 APPL 6 SPNG 2 UNDPD 1 OVERWRAP

## (undated) DEVICE — GOWN,AURORA,NON-REINFORCED,2XL: Brand: MEDLINE

## (undated) DEVICE — STARTER GUIDEWIRE

## (undated) DEVICE — MICROPUNCTURE INTRODUCER SET SILHOUETTE TRANSITIONLESS WITH STAINLESS STEEL WIRE GUIDE: Brand: MICROPUNCTURE